# Patient Record
Sex: FEMALE | Race: WHITE | Employment: FULL TIME | URBAN - METROPOLITAN AREA
[De-identification: names, ages, dates, MRNs, and addresses within clinical notes are randomized per-mention and may not be internally consistent; named-entity substitution may affect disease eponyms.]

---

## 2017-01-27 ENCOUNTER — TELEPHONE (OUTPATIENT)
Dept: FAMILY MEDICINE CLINIC | Age: 47
End: 2017-01-27

## 2017-01-27 VITALS — SYSTOLIC BLOOD PRESSURE: 156 MMHG | DIASTOLIC BLOOD PRESSURE: 90 MMHG

## 2017-02-07 ENCOUNTER — OFFICE VISIT (OUTPATIENT)
Dept: FAMILY MEDICINE CLINIC | Age: 47
End: 2017-02-07

## 2017-02-07 VITALS
HEART RATE: 74 BPM | WEIGHT: 256.25 LBS | SYSTOLIC BLOOD PRESSURE: 150 MMHG | HEIGHT: 66 IN | TEMPERATURE: 96.7 F | DIASTOLIC BLOOD PRESSURE: 100 MMHG | BODY MASS INDEX: 41.18 KG/M2 | RESPIRATION RATE: 12 BRPM | OXYGEN SATURATION: 99 %

## 2017-02-07 DIAGNOSIS — F41.9 ANXIETY: ICD-10-CM

## 2017-02-07 DIAGNOSIS — I15.0 RENOVASCULAR HYPERTENSION: Primary | ICD-10-CM

## 2017-02-07 PROCEDURE — 99213 OFFICE O/P EST LOW 20 MIN: CPT | Performed by: FAMILY MEDICINE

## 2017-02-07 ASSESSMENT — ENCOUNTER SYMPTOMS
EYES NEGATIVE: 1
NAUSEA: 0
CONSTIPATION: 0
COUGH: 0
ABDOMINAL PAIN: 0
SHORTNESS OF BREATH: 0
DIARRHEA: 0
VOMITING: 0

## 2017-02-17 ENCOUNTER — NURSE ONLY (OUTPATIENT)
Dept: FAMILY MEDICINE CLINIC | Age: 47
End: 2017-02-17

## 2017-02-17 DIAGNOSIS — Z01.30 BLOOD PRESSURE CHECK: Primary | ICD-10-CM

## 2017-02-21 VITALS — DIASTOLIC BLOOD PRESSURE: 80 MMHG | SYSTOLIC BLOOD PRESSURE: 130 MMHG

## 2017-03-03 RX ORDER — CHLORTHALIDONE 25 MG/1
25 TABLET ORAL DAILY
Qty: 30 TABLET | Refills: 3 | Status: SHIPPED | OUTPATIENT
Start: 2017-03-03 | End: 2017-03-14 | Stop reason: SDUPTHER

## 2017-03-14 ENCOUNTER — OFFICE VISIT (OUTPATIENT)
Dept: FAMILY MEDICINE CLINIC | Age: 47
End: 2017-03-14

## 2017-03-14 VITALS
OXYGEN SATURATION: 97 % | SYSTOLIC BLOOD PRESSURE: 130 MMHG | BODY MASS INDEX: 40.73 KG/M2 | HEART RATE: 77 BPM | WEIGHT: 253.44 LBS | TEMPERATURE: 97.8 F | HEIGHT: 66 IN | RESPIRATION RATE: 12 BRPM | DIASTOLIC BLOOD PRESSURE: 82 MMHG

## 2017-03-14 DIAGNOSIS — I15.0 RENOVASCULAR HYPERTENSION: Primary | ICD-10-CM

## 2017-03-14 PROCEDURE — 99213 OFFICE O/P EST LOW 20 MIN: CPT | Performed by: FAMILY MEDICINE

## 2017-03-14 RX ORDER — NEBIVOLOL 10 MG/1
20 TABLET ORAL DAILY
Qty: 270 TABLET | Refills: 1
Start: 2017-03-14 | End: 2017-10-24 | Stop reason: SDUPTHER

## 2017-03-14 RX ORDER — MELOXICAM 15 MG/1
TABLET ORAL
COMMUNITY
Start: 2016-12-13 | End: 2017-08-31 | Stop reason: ALTCHOICE

## 2017-03-14 RX ORDER — CHLORTHALIDONE 25 MG/1
25 TABLET ORAL DAILY
Qty: 90 TABLET | Refills: 1 | Status: SHIPPED | OUTPATIENT
Start: 2017-03-14 | End: 2017-03-25 | Stop reason: DRUGHIGH

## 2017-03-25 ENCOUNTER — OFFICE VISIT (OUTPATIENT)
Dept: FAMILY MEDICINE CLINIC | Age: 47
End: 2017-03-25

## 2017-03-25 VITALS
DIASTOLIC BLOOD PRESSURE: 80 MMHG | BODY MASS INDEX: 41.59 KG/M2 | WEIGHT: 258.8 LBS | TEMPERATURE: 98.3 F | SYSTOLIC BLOOD PRESSURE: 130 MMHG | RESPIRATION RATE: 18 BRPM | HEART RATE: 80 BPM | HEIGHT: 66 IN

## 2017-03-25 DIAGNOSIS — I15.0 RENOVASCULAR HYPERTENSION: ICD-10-CM

## 2017-03-25 DIAGNOSIS — M10.271 ACUTE DRUG-INDUCED GOUT INVOLVING TOE OF RIGHT FOOT: Primary | ICD-10-CM

## 2017-03-25 PROCEDURE — 96372 THER/PROPH/DIAG INJ SC/IM: CPT | Performed by: FAMILY MEDICINE

## 2017-03-25 PROCEDURE — 99213 OFFICE O/P EST LOW 20 MIN: CPT | Performed by: FAMILY MEDICINE

## 2017-03-25 RX ORDER — CHLORTHALIDONE 25 MG/1
12.5 TABLET ORAL DAILY
Qty: 90 TABLET | Refills: 1 | Status: SHIPPED
Start: 2017-03-25 | End: 2018-04-18 | Stop reason: SDUPTHER

## 2017-03-25 RX ORDER — KETOROLAC TROMETHAMINE 30 MG/ML
60 INJECTION, SOLUTION INTRAMUSCULAR; INTRAVENOUS ONCE
Status: COMPLETED | OUTPATIENT
Start: 2017-03-25 | End: 2017-03-25

## 2017-03-25 RX ORDER — TRIAMCINOLONE ACETONIDE 40 MG/ML
80 INJECTION, SUSPENSION INTRA-ARTICULAR; INTRAMUSCULAR ONCE
Status: COMPLETED | OUTPATIENT
Start: 2017-03-25 | End: 2017-03-25

## 2017-03-25 RX ORDER — PREDNISONE 10 MG/1
TABLET ORAL
Qty: 60 TABLET | Refills: 0 | Status: SHIPPED | OUTPATIENT
Start: 2017-03-25 | End: 2017-04-05 | Stop reason: ALTCHOICE

## 2017-03-25 RX ADMIN — TRIAMCINOLONE ACETONIDE 80 MG: 40 INJECTION, SUSPENSION INTRA-ARTICULAR; INTRAMUSCULAR at 12:39

## 2017-03-25 RX ADMIN — KETOROLAC TROMETHAMINE 60 MG: 30 INJECTION, SOLUTION INTRAMUSCULAR; INTRAVENOUS at 12:38

## 2017-04-03 ENCOUNTER — NURSE ONLY (OUTPATIENT)
Dept: FAMILY MEDICINE CLINIC | Age: 47
End: 2017-04-03

## 2017-04-03 VITALS — SYSTOLIC BLOOD PRESSURE: 148 MMHG | DIASTOLIC BLOOD PRESSURE: 90 MMHG

## 2017-04-03 DIAGNOSIS — I15.9 SECONDARY HYPERTENSION: Primary | ICD-10-CM

## 2017-04-05 ENCOUNTER — OFFICE VISIT (OUTPATIENT)
Dept: FAMILY MEDICINE CLINIC | Age: 47
End: 2017-04-05

## 2017-04-05 VITALS
TEMPERATURE: 97.6 F | BODY MASS INDEX: 41.2 KG/M2 | WEIGHT: 256.38 LBS | HEIGHT: 66 IN | DIASTOLIC BLOOD PRESSURE: 84 MMHG | RESPIRATION RATE: 12 BRPM | SYSTOLIC BLOOD PRESSURE: 126 MMHG | HEART RATE: 80 BPM

## 2017-04-05 DIAGNOSIS — J01.00 ACUTE NON-RECURRENT MAXILLARY SINUSITIS: Primary | ICD-10-CM

## 2017-04-05 DIAGNOSIS — I15.0 RENOVASCULAR HYPERTENSION: ICD-10-CM

## 2017-04-05 PROCEDURE — 99213 OFFICE O/P EST LOW 20 MIN: CPT | Performed by: FAMILY MEDICINE

## 2017-04-05 RX ORDER — AZITHROMYCIN 250 MG/1
TABLET, FILM COATED ORAL
Qty: 1 PACKET | Refills: 1 | Status: SHIPPED | OUTPATIENT
Start: 2017-04-05 | End: 2017-04-15

## 2017-04-05 ASSESSMENT — ENCOUNTER SYMPTOMS
SINUS PAIN: 1
COUGH: 1

## 2017-05-03 ENCOUNTER — OFFICE VISIT (OUTPATIENT)
Dept: FAMILY MEDICINE CLINIC | Age: 47
End: 2017-05-03

## 2017-05-03 VITALS
BODY MASS INDEX: 41.82 KG/M2 | SYSTOLIC BLOOD PRESSURE: 130 MMHG | HEART RATE: 69 BPM | OXYGEN SATURATION: 96 % | WEIGHT: 260.19 LBS | TEMPERATURE: 96.9 F | DIASTOLIC BLOOD PRESSURE: 84 MMHG | HEIGHT: 66 IN

## 2017-05-03 DIAGNOSIS — M94.0 ACUTE COSTOCHONDRITIS: ICD-10-CM

## 2017-05-03 DIAGNOSIS — J30.2 SEASONAL ALLERGIC RHINITIS, UNSPECIFIED ALLERGIC RHINITIS TRIGGER: Primary | ICD-10-CM

## 2017-05-03 PROCEDURE — 99213 OFFICE O/P EST LOW 20 MIN: CPT | Performed by: FAMILY MEDICINE

## 2017-05-03 RX ORDER — FLUTICASONE PROPIONATE 50 MCG
2 SPRAY, SUSPENSION (ML) NASAL DAILY
Qty: 1 BOTTLE | Refills: 3 | Status: SHIPPED | OUTPATIENT
Start: 2017-05-03 | End: 2017-08-31 | Stop reason: ALTCHOICE

## 2017-05-03 RX ORDER — CETIRIZINE HYDROCHLORIDE 10 MG/1
10 TABLET ORAL DAILY
Qty: 90 TABLET | Refills: 1 | Status: SHIPPED | OUTPATIENT
Start: 2017-05-03 | End: 2017-10-24 | Stop reason: ALTCHOICE

## 2017-05-03 ASSESSMENT — ENCOUNTER SYMPTOMS
SINUS PAIN: 1
COUGH: 1
ABDOMINAL PAIN: 1

## 2017-06-01 DIAGNOSIS — I15.0 RENOVASCULAR HYPERTENSION: ICD-10-CM

## 2017-06-02 RX ORDER — NEBIVOLOL HYDROCHLORIDE 10 MG/1
TABLET ORAL
Qty: 270 TABLET | Refills: 1 | Status: SHIPPED | OUTPATIENT
Start: 2017-06-02 | End: 2018-01-26 | Stop reason: SDUPTHER

## 2017-06-07 ENCOUNTER — OFFICE VISIT (OUTPATIENT)
Dept: FAMILY MEDICINE CLINIC | Age: 47
End: 2017-06-07

## 2017-06-07 VITALS
HEIGHT: 66 IN | DIASTOLIC BLOOD PRESSURE: 82 MMHG | OXYGEN SATURATION: 98 % | SYSTOLIC BLOOD PRESSURE: 138 MMHG | HEART RATE: 78 BPM | BODY MASS INDEX: 41.95 KG/M2 | WEIGHT: 261 LBS | TEMPERATURE: 97.3 F

## 2017-06-07 DIAGNOSIS — I15.0 RENOVASCULAR HYPERTENSION: ICD-10-CM

## 2017-06-07 DIAGNOSIS — J01.00 ACUTE NON-RECURRENT MAXILLARY SINUSITIS: Primary | ICD-10-CM

## 2017-06-07 PROCEDURE — 99213 OFFICE O/P EST LOW 20 MIN: CPT | Performed by: FAMILY MEDICINE

## 2017-06-07 RX ORDER — AZITHROMYCIN 250 MG/1
TABLET, FILM COATED ORAL
Qty: 1 PACKET | Refills: 1 | Status: SHIPPED | OUTPATIENT
Start: 2017-06-07 | End: 2017-06-17 | Stop reason: ALTCHOICE

## 2017-06-07 ASSESSMENT — ENCOUNTER SYMPTOMS
SINUS PAIN: 1
COUGH: 1
SORE THROAT: 1
RHINORRHEA: 1

## 2017-06-09 ENCOUNTER — TELEPHONE (OUTPATIENT)
Dept: FAMILY MEDICINE CLINIC | Age: 47
End: 2017-06-09

## 2017-06-09 RX ORDER — CEFDINIR 300 MG/1
300 CAPSULE ORAL 2 TIMES DAILY
Qty: 20 CAPSULE | Refills: 0 | Status: CANCELLED | OUTPATIENT
Start: 2017-06-09 | End: 2017-06-19

## 2017-06-09 RX ORDER — CEFUROXIME AXETIL 250 MG/1
250 TABLET ORAL 2 TIMES DAILY
Qty: 20 TABLET | Refills: 0 | Status: SHIPPED | OUTPATIENT
Start: 2017-06-09 | End: 2017-06-19

## 2017-06-14 ENCOUNTER — TELEPHONE (OUTPATIENT)
Dept: FAMILY MEDICINE CLINIC | Age: 47
End: 2017-06-14

## 2017-06-14 RX ORDER — METHYLPREDNISOLONE 4 MG/1
TABLET ORAL
Qty: 1 KIT | Refills: 0 | Status: SHIPPED | OUTPATIENT
Start: 2017-06-14 | End: 2017-06-20

## 2017-06-17 ENCOUNTER — OFFICE VISIT (OUTPATIENT)
Dept: FAMILY MEDICINE CLINIC | Age: 47
End: 2017-06-17

## 2017-06-17 VITALS
SYSTOLIC BLOOD PRESSURE: 138 MMHG | HEART RATE: 60 BPM | WEIGHT: 257.2 LBS | DIASTOLIC BLOOD PRESSURE: 82 MMHG | TEMPERATURE: 97.7 F | HEIGHT: 66 IN | BODY MASS INDEX: 41.33 KG/M2 | RESPIRATION RATE: 16 BRPM

## 2017-06-17 DIAGNOSIS — H93.8X3 EAR FULLNESS, BILATERAL: ICD-10-CM

## 2017-06-17 DIAGNOSIS — H69.83 EUSTACHIAN TUBE DYSFUNCTION, BILATERAL: Primary | ICD-10-CM

## 2017-06-17 PROCEDURE — 92567 TYMPANOMETRY: CPT | Performed by: FAMILY MEDICINE

## 2017-06-17 PROCEDURE — 99213 OFFICE O/P EST LOW 20 MIN: CPT | Performed by: FAMILY MEDICINE

## 2017-06-17 RX ORDER — PREDNISONE 10 MG/1
TABLET ORAL
Qty: 51 TABLET | Refills: 0 | Status: SHIPPED | OUTPATIENT
Start: 2017-06-17 | End: 2017-06-27

## 2017-06-19 ENCOUNTER — TELEPHONE (OUTPATIENT)
Dept: FAMILY MEDICINE CLINIC | Age: 47
End: 2017-06-19

## 2017-06-23 ENCOUNTER — TELEPHONE (OUTPATIENT)
Dept: OBGYN | Age: 47
End: 2017-06-23

## 2017-06-28 ENCOUNTER — OFFICE VISIT (OUTPATIENT)
Dept: FAMILY MEDICINE CLINIC | Age: 47
End: 2017-06-28

## 2017-06-28 VITALS
HEART RATE: 74 BPM | TEMPERATURE: 97.7 F | WEIGHT: 257 LBS | DIASTOLIC BLOOD PRESSURE: 72 MMHG | BODY MASS INDEX: 41.3 KG/M2 | SYSTOLIC BLOOD PRESSURE: 118 MMHG | HEIGHT: 66 IN

## 2017-06-28 DIAGNOSIS — J30.2 SEASONAL ALLERGIC RHINITIS, UNSPECIFIED ALLERGIC RHINITIS TRIGGER: ICD-10-CM

## 2017-06-28 DIAGNOSIS — J01.01 ACUTE RECURRENT MAXILLARY SINUSITIS: Primary | ICD-10-CM

## 2017-06-28 PROCEDURE — 96372 THER/PROPH/DIAG INJ SC/IM: CPT | Performed by: FAMILY MEDICINE

## 2017-06-28 PROCEDURE — 99213 OFFICE O/P EST LOW 20 MIN: CPT | Performed by: FAMILY MEDICINE

## 2017-06-28 RX ORDER — TRIAMCINOLONE ACETONIDE 40 MG/ML
80 INJECTION, SUSPENSION INTRA-ARTICULAR; INTRAMUSCULAR ONCE
Status: COMPLETED | OUTPATIENT
Start: 2017-06-28 | End: 2017-06-28

## 2017-06-28 RX ADMIN — TRIAMCINOLONE ACETONIDE 80 MG: 40 INJECTION, SUSPENSION INTRA-ARTICULAR; INTRAMUSCULAR at 12:08

## 2017-06-28 ASSESSMENT — ENCOUNTER SYMPTOMS
CONSTIPATION: 0
EYE DISCHARGE: 0
SINUS PRESSURE: 0
SORE THROAT: 1
ABDOMINAL PAIN: 0
RHINORRHEA: 0
COUGH: 1
DIARRHEA: 0
EYE ITCHING: 0

## 2017-08-24 ENCOUNTER — NURSE ONLY (OUTPATIENT)
Dept: FAMILY MEDICINE CLINIC | Age: 47
End: 2017-08-24

## 2017-08-24 VITALS — DIASTOLIC BLOOD PRESSURE: 80 MMHG | SYSTOLIC BLOOD PRESSURE: 138 MMHG

## 2017-08-25 ENCOUNTER — TELEPHONE (OUTPATIENT)
Dept: FAMILY MEDICINE CLINIC | Age: 47
End: 2017-08-25

## 2017-08-26 ENCOUNTER — OFFICE VISIT (OUTPATIENT)
Dept: FAMILY MEDICINE CLINIC | Age: 47
End: 2017-08-26

## 2017-08-26 VITALS
HEART RATE: 82 BPM | RESPIRATION RATE: 18 BRPM | BODY MASS INDEX: 40.5 KG/M2 | TEMPERATURE: 97.3 F | SYSTOLIC BLOOD PRESSURE: 136 MMHG | WEIGHT: 252 LBS | HEIGHT: 66 IN | DIASTOLIC BLOOD PRESSURE: 82 MMHG

## 2017-08-26 DIAGNOSIS — R23.8 SKIN SENSITIVITY: Primary | ICD-10-CM

## 2017-08-26 DIAGNOSIS — R20.0 NUMBNESS: ICD-10-CM

## 2017-08-26 PROCEDURE — 99213 OFFICE O/P EST LOW 20 MIN: CPT | Performed by: FAMILY MEDICINE

## 2017-08-26 RX ORDER — METHYLPREDNISOLONE 4 MG/1
TABLET ORAL
Qty: 1 KIT | Refills: 0 | Status: SHIPPED | OUTPATIENT
Start: 2017-08-26 | End: 2017-08-31 | Stop reason: ALTCHOICE

## 2017-08-26 ASSESSMENT — ENCOUNTER SYMPTOMS
WHEEZING: 0
SHORTNESS OF BREATH: 0
ABDOMINAL PAIN: 1
NAUSEA: 0
DIARRHEA: 0
BACK PAIN: 1
SORE THROAT: 0
SINUS PRESSURE: 0
RHINORRHEA: 0
CHEST TIGHTNESS: 0
VOMITING: 0
COUGH: 0

## 2017-08-26 ASSESSMENT — PATIENT HEALTH QUESTIONNAIRE - PHQ9
SUM OF ALL RESPONSES TO PHQ QUESTIONS 1-9: 0
SUM OF ALL RESPONSES TO PHQ9 QUESTIONS 1 & 2: 0
1. LITTLE INTEREST OR PLEASURE IN DOING THINGS: 0
2. FEELING DOWN, DEPRESSED OR HOPELESS: 0

## 2017-08-31 ENCOUNTER — OFFICE VISIT (OUTPATIENT)
Dept: FAMILY MEDICINE CLINIC | Age: 47
End: 2017-08-31

## 2017-08-31 VITALS
DIASTOLIC BLOOD PRESSURE: 84 MMHG | HEART RATE: 78 BPM | SYSTOLIC BLOOD PRESSURE: 112 MMHG | RESPIRATION RATE: 12 BRPM | WEIGHT: 249 LBS | TEMPERATURE: 98.4 F | HEIGHT: 66 IN | BODY MASS INDEX: 40.02 KG/M2

## 2017-08-31 DIAGNOSIS — J06.9 ACUTE UPPER RESPIRATORY INFECTION: Primary | ICD-10-CM

## 2017-08-31 DIAGNOSIS — I15.0 RENOVASCULAR HYPERTENSION: ICD-10-CM

## 2017-08-31 PROCEDURE — 99213 OFFICE O/P EST LOW 20 MIN: CPT | Performed by: FAMILY MEDICINE

## 2017-08-31 RX ORDER — METHYLPREDNISOLONE 4 MG/1
TABLET ORAL
Qty: 1 KIT | Refills: 0 | Status: SHIPPED | OUTPATIENT
Start: 2017-08-31 | End: 2017-09-06

## 2017-08-31 RX ORDER — AZITHROMYCIN 250 MG/1
TABLET, FILM COATED ORAL
Qty: 1 PACKET | Refills: 0 | Status: SHIPPED | OUTPATIENT
Start: 2017-08-31 | End: 2017-09-10

## 2017-10-24 ENCOUNTER — OFFICE VISIT (OUTPATIENT)
Dept: FAMILY MEDICINE CLINIC | Age: 47
End: 2017-10-24

## 2017-10-24 VITALS
SYSTOLIC BLOOD PRESSURE: 138 MMHG | HEIGHT: 66 IN | WEIGHT: 257.13 LBS | HEART RATE: 77 BPM | TEMPERATURE: 95.8 F | RESPIRATION RATE: 12 BRPM | BODY MASS INDEX: 41.32 KG/M2 | DIASTOLIC BLOOD PRESSURE: 88 MMHG | OXYGEN SATURATION: 97 %

## 2017-10-24 DIAGNOSIS — H65.06 RECURRENT ACUTE SEROUS OTITIS MEDIA OF BOTH EARS: Primary | ICD-10-CM

## 2017-10-24 PROCEDURE — 99213 OFFICE O/P EST LOW 20 MIN: CPT | Performed by: FAMILY MEDICINE

## 2017-10-24 RX ORDER — METHYLPREDNISOLONE 4 MG/1
TABLET ORAL
Qty: 1 KIT | Refills: 0 | Status: SHIPPED | OUTPATIENT
Start: 2017-10-24 | End: 2017-10-30

## 2017-10-24 NOTE — PROGRESS NOTES
Subjective:      Patient ID: Portillo Miranda is a 56 y.o. female. Chief Complaint   Patient presents with    Otalgia     Bilateral x 2 weeks. Gradually worsening since onset. No other sympotms. Has tried Sudafed with no relief. HPI     Positive pain behind both ears seem to gradually worsening last couple days has tried some Sudafed relief no fevers no chills been eating drinking pretty well otherwise      States she did get off cruise and a plain sure but feels more pressure      Allergies   Allergen Reactions    Codeine      Other reaction(s): SEVERE STOMACH PAIN    Morphine And Related      Other reaction(s): PATIENT DOESN'T RECALL REACTION.  Adhesive Tape Rash    Avelox [Moxifloxacin Hcl In Nacl] Rash    Penicillins Rash     Outpatient Encounter Prescriptions as of 10/24/2017   Medication Sig Dispense Refill    methylPREDNISolone (MEDROL DOSEPACK) 4 MG tablet Take by mouth. 1 kit 0    BYSTOLIC 10 MG tablet TAKE 3 TABLETS BY MOUTH DAILY 270 tablet 1    chlorthalidone (HYGROTON) 25 MG tablet Take 0.5 tablets by mouth daily 90 tablet 1    Azilsartan Medoxomil 80 MG TABS 1 per day 30 tablet 3    [DISCONTINUED] cetirizine (ZYRTEC ALLERGY) 10 MG tablet Take 1 tablet by mouth daily 90 tablet 1    [DISCONTINUED] nebivolol (BYSTOLIC) 10 MG tablet Take 2 tablets by mouth daily 270 tablet 1     Facility-Administered Encounter Medications as of 10/24/2017   Medication Dose Route Frequency Provider Last Rate Last Dose    triamcinolone acetonide (KENALOG-40) injection 80 mg  80 mg Intramuscular Once Kei Marie, DO         Social History     Social History    Marital status:      Spouse name: N/A    Number of children: N/A    Years of education: N/A     Occupational History    Not on file.      Social History Main Topics    Smoking status: Never Smoker    Smokeless tobacco: Never Used    Alcohol use Yes      Comment: ocassionally    Drug use: No    Sexual activity: Not on file Other Topics Concern    Not on file     Social History Narrative    No narrative on file     Family History   Problem Relation Age of Onset    High Blood Pressure Mother     High Blood Pressure Father      Past Medical History:   Diagnosis Date    Anxiety     CAD (coronary artery disease)     Hypertension     Somatic dysfunction of lumbar region      Past Surgical History:   Procedure Laterality Date    CARDIAC CATHETERIZATION      CARPAL TUNNEL RELEASE  01/03/14     DR FORD    HERNIA REPAIR           REVIEW OF SYSTEMS:   REVIEW OF SYSTEMS:   Patient seen today for exam.  Denies any problems with hearing, headaches or vision. Denies any shortness of breath, chest pain, nausea or vomiting. No black stool, no blood in the stool. No heartburn. Denies any problems with constipation or diarrhea either. No dysuria type symptoms. Objective:     /88 (Site: Left Arm, Position: Sitting, Cuff Size: Large Adult)   Pulse 77   Temp 95.8 °F (35.4 °C) (Temporal)   Resp 12   Ht 5' 6\" (1.676 m)   Wt 257 lb 2 oz (116.6 kg)   SpO2 97%   BMI 41.50 kg/m²     Physical Exam      O:  Alert and active female in no acute distress  HEENT:  TMs positive ear tube noted in the left patent the right also noted with some cerumen Pharynx neg. Nares clear, no drainage noted  Neck supple/ no adenopathy   HEART:  RRR without murmur/ no carotid bruits  LUNGS:  Clear to auscultation bilaterally, no wheeze or rhonchi noted  THYROID: neg masses or nodularity  ABDOMEN:  Soft x4. Bowel sounds positive. No masses or organomegaly,  Negative tenderness, guarding or rebound. EXTR:  Without edema./ good pulses bilat    Neurologic exam unremarkable. DTRs in upper and lower extremities within normal limits. Full strength noted    Skin- no lesions noted       Assessment:      1.  Recurrent acute serous otitis media of both ears               Plan:        Orders Placed This Encounter   Medications    methylPREDNISolone (MEDROL DOSEPACK) 4 MG tablet     Sig: Take by mouth. Dispense:  1 kit     Refill:  0     No orders of the defined types were placed in this encounter.           Health Maintenance Due   Topic Date Due    Flu vaccine (1) 09/01/2017        will hold off on antibiotics as I think this most likely viral her ear tube on the right is half clogged and will see ENT if it worsens      Controlled Substances Monitoring:              If anything worsens or changes please call us at once , follow-up in the office as planned,

## 2017-12-06 ENCOUNTER — OFFICE VISIT (OUTPATIENT)
Dept: FAMILY MEDICINE CLINIC | Age: 47
End: 2017-12-06

## 2017-12-06 DIAGNOSIS — I15.0 HYPERTENSION, RENOVASCULAR: Primary | ICD-10-CM

## 2017-12-06 DIAGNOSIS — Z12.31 VISIT FOR SCREENING MAMMOGRAM: ICD-10-CM

## 2017-12-06 DIAGNOSIS — E78.49 OTHER HYPERLIPIDEMIA: ICD-10-CM

## 2017-12-06 DIAGNOSIS — I15.0 RENOVASCULAR HYPERTENSION: Primary | ICD-10-CM

## 2017-12-06 DIAGNOSIS — R22.31 AXILLARY LUMP, RIGHT: ICD-10-CM

## 2017-12-06 DIAGNOSIS — Z00.00 ANNUAL PHYSICAL EXAM: ICD-10-CM

## 2017-12-06 PROCEDURE — 99213 OFFICE O/P EST LOW 20 MIN: CPT | Performed by: FAMILY MEDICINE

## 2017-12-06 RX ORDER — FAMOTIDINE 20 MG/1
20 TABLET, FILM COATED ORAL 2 TIMES DAILY
Qty: 60 TABLET | Refills: 3 | Status: SHIPPED | OUTPATIENT
Start: 2017-12-06 | End: 2018-02-07 | Stop reason: SDUPTHER

## 2017-12-06 NOTE — PROGRESS NOTES
History Narrative    No narrative on file     Family History   Problem Relation Age of Onset    High Blood Pressure Mother     High Blood Pressure Father      Past Medical History:   Diagnosis Date    Anxiety     CAD (coronary artery disease)     Hypertension     Somatic dysfunction of lumbar region      Past Surgical History:   Procedure Laterality Date    CARDIAC CATHETERIZATION      CARPAL TUNNEL RELEASE  01/03/14     DR FORD    HERNIA REPAIR           REVIEW OF SYSTEMS:   REVIEW OF SYSTEMS:   Patient seen today for exam.  Denies any problems with hearing, headaches or vision. Denies any shortness of breath, chest pain, nausea or vomiting. No black stool, no blood in the stool. No heartburn. Denies any problems with constipation or diarrhea either. No dysuria type symptoms. Objective:     /88 (Site: Right Arm, Position: Sitting, Cuff Size: Large Adult)   Pulse 71   Temp 96.4 °F (35.8 °C) (Temporal)   Resp 12   Ht 5' 6\" (1.676 m)   Wt 254 lb 2 oz (115.3 kg)   SpO2 97%   BMI 41.02 kg/m²     Physical Exam      O:  Alert and active female in no acute distress  HEENT:  TMs clear. Pharynx neg. Nares clear, no drainage noted  Neck supple/ no adenopathy   HEART:  RRR without murmur/ no carotid bruits  LUNGS:  Clear to auscultation bilaterally, no wheeze or rhonchi noted  THYROID: neg masses or nodularity  ABDOMEN:  Soft x4. Bowel sounds positive. No masses or organomegaly,  Negative tenderness, guarding or rebound. EXTR:  Without edema./ good pulses bilat    Neurologic exam unremarkable. DTRs in upper and lower extremities within normal limits. Full strength noted    Skin- no lesions noted       Assessment:      1. Renovascular hypertension     2. Other hyperlipidemia     3. Axillary lump, right     4. Visit for screening mammogram  BELLA DIGITAL SCREEN W OR WO CAD BILATERAL   5.  Annual physical exam  CBC Auto Differential    Comprehensive Metabolic Panel    Lipid Panel

## 2017-12-07 DIAGNOSIS — I15.0 HYPERTENSION, RENOVASCULAR: Primary | ICD-10-CM

## 2017-12-07 DIAGNOSIS — R22.31 AXILLARY LUMP, RIGHT: ICD-10-CM

## 2017-12-07 DIAGNOSIS — Z00.00 ANNUAL PHYSICAL EXAM: ICD-10-CM

## 2017-12-07 LAB
ALBUMIN SERPL-MCNC: 4.1 G/DL (ref 3.9–4.9)
ALP BLD-CCNC: 90 U/L (ref 40–130)
ALT SERPL-CCNC: 18 U/L (ref 0–33)
ANION GAP SERPL CALCULATED.3IONS-SCNC: 16 MEQ/L (ref 7–13)
AST SERPL-CCNC: 21 U/L (ref 0–35)
BASOPHILS ABSOLUTE: 0 K/UL (ref 0–0.2)
BASOPHILS RELATIVE PERCENT: 0.3 %
BILIRUB SERPL-MCNC: 0.3 MG/DL (ref 0–1.2)
BUN BLDV-MCNC: 17 MG/DL (ref 6–20)
CALCIUM SERPL-MCNC: 9.6 MG/DL (ref 8.6–10.2)
CHLORIDE BLD-SCNC: 102 MEQ/L (ref 98–107)
CHOLESTEROL, TOTAL: 205 MG/DL (ref 0–199)
CO2: 26 MEQ/L (ref 22–29)
CREAT SERPL-MCNC: 0.78 MG/DL (ref 0.5–0.9)
EOSINOPHILS ABSOLUTE: 0.1 K/UL (ref 0–0.7)
EOSINOPHILS RELATIVE PERCENT: 1.3 %
GFR AFRICAN AMERICAN: >60
GFR NON-AFRICAN AMERICAN: >60
GLOBULIN: 3.4 G/DL (ref 2.3–3.5)
GLUCOSE BLD-MCNC: 79 MG/DL (ref 74–109)
HCT VFR BLD CALC: 38.3 % (ref 37–47)
HDLC SERPL-MCNC: 45 MG/DL (ref 40–59)
HEMOGLOBIN: 12.7 G/DL (ref 12–16)
LDL CHOLESTEROL CALCULATED: 123 MG/DL (ref 0–129)
LYMPHOCYTES ABSOLUTE: 2.5 K/UL (ref 1–4.8)
LYMPHOCYTES RELATIVE PERCENT: 37.2 %
MCH RBC QN AUTO: 29.4 PG (ref 27–31.3)
MCHC RBC AUTO-ENTMCNC: 33.2 % (ref 33–37)
MCV RBC AUTO: 88.5 FL (ref 82–100)
MONOCYTES ABSOLUTE: 0.4 K/UL (ref 0.2–0.8)
MONOCYTES RELATIVE PERCENT: 6.1 %
NEUTROPHILS ABSOLUTE: 3.7 K/UL (ref 1.4–6.5)
NEUTROPHILS RELATIVE PERCENT: 55.1 %
PDW BLD-RTO: 15.1 % (ref 11.5–14.5)
PLATELET # BLD: 215 K/UL (ref 130–400)
POTASSIUM SERPL-SCNC: 5.1 MEQ/L (ref 3.5–5.1)
RBC # BLD: 4.33 M/UL (ref 4.2–5.4)
SODIUM BLD-SCNC: 144 MEQ/L (ref 132–144)
TOTAL PROTEIN: 7.5 G/DL (ref 6.4–8.1)
TRIGL SERPL-MCNC: 184 MG/DL (ref 0–200)
WBC # BLD: 6.7 K/UL (ref 4.8–10.8)

## 2017-12-10 VITALS
SYSTOLIC BLOOD PRESSURE: 128 MMHG | HEIGHT: 66 IN | RESPIRATION RATE: 12 BRPM | OXYGEN SATURATION: 97 % | BODY MASS INDEX: 40.84 KG/M2 | DIASTOLIC BLOOD PRESSURE: 88 MMHG | HEART RATE: 71 BPM | WEIGHT: 254.13 LBS | TEMPERATURE: 96.4 F

## 2017-12-21 ENCOUNTER — HOSPITAL ENCOUNTER (OUTPATIENT)
Dept: WOMENS IMAGING | Age: 47
Discharge: HOME | End: 2017-12-21
Payer: COMMERCIAL

## 2017-12-21 ENCOUNTER — HOSPITAL ENCOUNTER (OUTPATIENT)
Dept: ULTRASOUND IMAGING | Age: 47
Discharge: HOME | End: 2017-12-21
Payer: COMMERCIAL

## 2017-12-21 DIAGNOSIS — R22.31 AXILLARY LUMP, RIGHT: ICD-10-CM

## 2017-12-21 DIAGNOSIS — I15.0 HYPERTENSION, RENOVASCULAR: ICD-10-CM

## 2017-12-21 PROCEDURE — 76642 ULTRASOUND BREAST LIMITED: CPT

## 2017-12-21 PROCEDURE — G0204 DX MAMMO INCL CAD BI: HCPCS

## 2018-01-26 DIAGNOSIS — I15.0 RENOVASCULAR HYPERTENSION: ICD-10-CM

## 2018-01-26 RX ORDER — NEBIVOLOL HYDROCHLORIDE 10 MG/1
TABLET ORAL
Qty: 270 TABLET | Refills: 1 | Status: SHIPPED | OUTPATIENT
Start: 2018-01-26 | End: 2018-04-18 | Stop reason: SDUPTHER

## 2018-02-02 RX ORDER — CHLORTHALIDONE 25 MG/1
25 TABLET ORAL DAILY
Qty: 90 TABLET | Refills: 1 | Status: SHIPPED | OUTPATIENT
Start: 2018-02-02 | End: 2018-07-30

## 2018-02-07 RX ORDER — FAMOTIDINE 20 MG/1
20 TABLET, FILM COATED ORAL 2 TIMES DAILY
Qty: 60 TABLET | Refills: 3 | Status: SHIPPED | OUTPATIENT
Start: 2018-02-07 | End: 2018-07-25 | Stop reason: SDUPTHER

## 2018-02-18 DIAGNOSIS — I15.0 RENOVASCULAR HYPERTENSION: ICD-10-CM

## 2018-02-19 RX ORDER — NEBIVOLOL HYDROCHLORIDE 10 MG/1
TABLET ORAL
Qty: 270 TABLET | Refills: 1 | Status: SHIPPED | OUTPATIENT
Start: 2018-02-19 | End: 2018-12-10 | Stop reason: SDUPTHER

## 2018-04-18 ENCOUNTER — OFFICE VISIT (OUTPATIENT)
Dept: FAMILY MEDICINE CLINIC | Age: 48
End: 2018-04-18
Payer: COMMERCIAL

## 2018-04-18 VITALS
DIASTOLIC BLOOD PRESSURE: 84 MMHG | RESPIRATION RATE: 12 BRPM | WEIGHT: 265 LBS | BODY MASS INDEX: 42.59 KG/M2 | OXYGEN SATURATION: 96 % | TEMPERATURE: 96.1 F | HEIGHT: 66 IN | SYSTOLIC BLOOD PRESSURE: 130 MMHG | HEART RATE: 87 BPM

## 2018-04-18 DIAGNOSIS — M25.561 CHRONIC PAIN OF RIGHT KNEE: ICD-10-CM

## 2018-04-18 DIAGNOSIS — I15.0 RENOVASCULAR HYPERTENSION: ICD-10-CM

## 2018-04-18 DIAGNOSIS — G89.29 CHRONIC PAIN OF RIGHT KNEE: ICD-10-CM

## 2018-04-18 DIAGNOSIS — J32.0 CHRONIC MAXILLARY SINUSITIS: Primary | ICD-10-CM

## 2018-04-18 PROCEDURE — 99213 OFFICE O/P EST LOW 20 MIN: CPT | Performed by: FAMILY MEDICINE

## 2018-04-18 RX ORDER — METHYLPREDNISOLONE 4 MG/1
TABLET ORAL
Qty: 1 KIT | Refills: 0 | Status: SHIPPED | OUTPATIENT
Start: 2018-04-18 | End: 2018-04-24

## 2018-05-14 ENCOUNTER — OFFICE VISIT (OUTPATIENT)
Dept: FAMILY MEDICINE CLINIC | Age: 48
End: 2018-05-14
Payer: COMMERCIAL

## 2018-05-14 VITALS
TEMPERATURE: 97.1 F | RESPIRATION RATE: 12 BRPM | BODY MASS INDEX: 41.46 KG/M2 | SYSTOLIC BLOOD PRESSURE: 130 MMHG | DIASTOLIC BLOOD PRESSURE: 82 MMHG | HEIGHT: 66 IN | HEART RATE: 81 BPM | WEIGHT: 258 LBS

## 2018-05-14 DIAGNOSIS — M94.0 COSTOCHONDRITIS: ICD-10-CM

## 2018-05-14 DIAGNOSIS — I15.0 RENOVASCULAR HYPERTENSION: ICD-10-CM

## 2018-05-14 DIAGNOSIS — D17.71 ANGIOLIPOMA OF RIGHT KIDNEY: ICD-10-CM

## 2018-05-14 DIAGNOSIS — R10.11 RUQ PAIN: Primary | ICD-10-CM

## 2018-05-14 PROCEDURE — 99213 OFFICE O/P EST LOW 20 MIN: CPT | Performed by: FAMILY MEDICINE

## 2018-05-14 RX ORDER — METHYLPREDNISOLONE 4 MG/1
TABLET ORAL
Qty: 1 KIT | Refills: 0 | Status: SHIPPED | OUTPATIENT
Start: 2018-05-14 | End: 2018-05-20

## 2018-05-14 RX ORDER — SULFAMETHOXAZOLE AND TRIMETHOPRIM 800; 160 MG/1; MG/1
TABLET ORAL
COMMUNITY
Start: 2018-05-13 | End: 2018-06-19 | Stop reason: ALTCHOICE

## 2018-06-01 ENCOUNTER — NURSE ONLY (OUTPATIENT)
Dept: FAMILY MEDICINE CLINIC | Age: 48
End: 2018-06-01

## 2018-06-01 ENCOUNTER — TELEPHONE (OUTPATIENT)
Dept: FAMILY MEDICINE CLINIC | Age: 48
End: 2018-06-01

## 2018-06-01 ENCOUNTER — TELEPHONE (OUTPATIENT)
Dept: FAMILY MEDICINE CLINIC | Age: 48
End: 2018-06-01
Payer: COMMERCIAL

## 2018-06-01 DIAGNOSIS — R30.0 DYSURIA: Primary | ICD-10-CM

## 2018-06-01 LAB
BILIRUBIN, POC: NORMAL
BLOOD URINE, POC: NORMAL
CLARITY, POC: NORMAL
COLOR, POC: NORMAL
GLUCOSE URINE, POC: NORMAL
KETONES, POC: NORMAL
LEUKOCYTE EST, POC: NORMAL
NITRITE, POC: NORMAL
PH, POC: 6
PROTEIN, POC: NORMAL
SPECIFIC GRAVITY, POC: 1.02
UROBILINOGEN, POC: NORMAL

## 2018-06-01 PROCEDURE — 81003 URINALYSIS AUTO W/O SCOPE: CPT | Performed by: FAMILY MEDICINE

## 2018-06-02 ENCOUNTER — TELEPHONE (OUTPATIENT)
Dept: FAMILY MEDICINE CLINIC | Age: 48
End: 2018-06-02

## 2018-06-02 RX ORDER — NITROFURANTOIN 25; 75 MG/1; MG/1
100 CAPSULE ORAL 2 TIMES DAILY
Qty: 14 CAPSULE | Refills: 0 | Status: SHIPPED | OUTPATIENT
Start: 2018-06-02 | End: 2018-06-09

## 2018-06-04 LAB
ORGANISM: ABNORMAL
URINE CULTURE, ROUTINE: ABNORMAL
URINE CULTURE, ROUTINE: ABNORMAL

## 2018-06-11 RX ORDER — CHLORTHALIDONE 25 MG/1
25 TABLET ORAL DAILY
Qty: 90 TABLET | Refills: 1 | Status: SHIPPED | OUTPATIENT
Start: 2018-06-11 | End: 2018-07-30

## 2018-06-19 ENCOUNTER — OFFICE VISIT (OUTPATIENT)
Dept: FAMILY MEDICINE CLINIC | Age: 48
End: 2018-06-19
Payer: COMMERCIAL

## 2018-06-19 VITALS
TEMPERATURE: 98.2 F | RESPIRATION RATE: 20 BRPM | DIASTOLIC BLOOD PRESSURE: 80 MMHG | BODY MASS INDEX: 42.38 KG/M2 | HEIGHT: 65 IN | SYSTOLIC BLOOD PRESSURE: 118 MMHG | HEART RATE: 64 BPM | WEIGHT: 254.4 LBS

## 2018-06-19 DIAGNOSIS — K21.00 GASTROESOPHAGEAL REFLUX DISEASE WITH ESOPHAGITIS: Primary | ICD-10-CM

## 2018-06-19 DIAGNOSIS — K21.00 GASTROESOPHAGEAL REFLUX DISEASE WITH ESOPHAGITIS: ICD-10-CM

## 2018-06-19 DIAGNOSIS — R19.7 DIARRHEA, UNSPECIFIED TYPE: ICD-10-CM

## 2018-06-19 DIAGNOSIS — N39.0 URINARY TRACT INFECTION WITHOUT HEMATURIA, SITE UNSPECIFIED: ICD-10-CM

## 2018-06-19 LAB
BILIRUBIN, POC: 1
BLOOD URINE, POC: NORMAL
CLARITY, POC: CLEAR
COLOR, POC: NORMAL
GLUCOSE URINE, POC: NORMAL
KETONES, POC: NORMAL
LEUKOCYTE EST, POC: NORMAL
NITRITE, POC: NORMAL
PH, POC: 6
PROTEIN, POC: NORMAL
SPECIFIC GRAVITY, POC: 1.03
UROBILINOGEN, POC: NORMAL

## 2018-06-19 PROCEDURE — 81003 URINALYSIS AUTO W/O SCOPE: CPT | Performed by: FAMILY MEDICINE

## 2018-06-19 PROCEDURE — 99214 OFFICE O/P EST MOD 30 MIN: CPT | Performed by: FAMILY MEDICINE

## 2018-06-19 RX ORDER — PANTOPRAZOLE SODIUM 40 MG/1
40 TABLET, DELAYED RELEASE ORAL DAILY
Qty: 30 TABLET | Refills: 3 | Status: SHIPPED | OUTPATIENT
Start: 2018-06-19 | End: 2018-10-19 | Stop reason: SDUPTHER

## 2018-06-21 LAB — URINE CULTURE, ROUTINE: NORMAL

## 2018-06-22 ENCOUNTER — TELEPHONE (OUTPATIENT)
Dept: FAMILY MEDICINE CLINIC | Age: 48
End: 2018-06-22

## 2018-06-22 LAB — CELIAC PANEL: 10 UNITS (ref 0–19)

## 2018-06-30 ENCOUNTER — TELEPHONE (OUTPATIENT)
Dept: FAMILY MEDICINE CLINIC | Age: 48
End: 2018-06-30

## 2018-06-30 RX ORDER — METHYLPREDNISOLONE 4 MG/1
TABLET ORAL
Qty: 1 KIT | Refills: 0 | Status: SHIPPED | OUTPATIENT
Start: 2018-06-30 | End: 2018-10-29 | Stop reason: ALTCHOICE

## 2018-07-02 ENCOUNTER — OFFICE VISIT (OUTPATIENT)
Dept: FAMILY MEDICINE CLINIC | Age: 48
End: 2018-07-02
Payer: COMMERCIAL

## 2018-07-02 VITALS
TEMPERATURE: 96.5 F | HEART RATE: 75 BPM | OXYGEN SATURATION: 96 % | HEIGHT: 65 IN | BODY MASS INDEX: 44.2 KG/M2 | RESPIRATION RATE: 18 BRPM | SYSTOLIC BLOOD PRESSURE: 138 MMHG | DIASTOLIC BLOOD PRESSURE: 82 MMHG | WEIGHT: 265.3 LBS

## 2018-07-02 DIAGNOSIS — M10.072 ACUTE IDIOPATHIC GOUT OF LEFT FOOT: Primary | ICD-10-CM

## 2018-07-02 PROCEDURE — 96372 THER/PROPH/DIAG INJ SC/IM: CPT | Performed by: FAMILY MEDICINE

## 2018-07-02 PROCEDURE — 99213 OFFICE O/P EST LOW 20 MIN: CPT | Performed by: FAMILY MEDICINE

## 2018-07-02 RX ORDER — METHYLPREDNISOLONE ACETATE 80 MG/ML
80 INJECTION, SUSPENSION INTRA-ARTICULAR; INTRALESIONAL; INTRAMUSCULAR; SOFT TISSUE ONCE
Status: SHIPPED | OUTPATIENT
Start: 2018-07-02 | End: 2038-07-03

## 2018-07-02 RX ORDER — KETOROLAC TROMETHAMINE 30 MG/ML
60 INJECTION, SOLUTION INTRAMUSCULAR; INTRAVENOUS ONCE
Status: COMPLETED | OUTPATIENT
Start: 2018-07-02 | End: 2018-07-02

## 2018-07-02 RX ORDER — PREDNISONE 10 MG/1
TABLET ORAL
Qty: 60 TABLET | Refills: 0 | Status: SHIPPED | OUTPATIENT
Start: 2018-07-02 | End: 2018-07-30 | Stop reason: ALTCHOICE

## 2018-07-02 RX ORDER — KETOROLAC TROMETHAMINE 30 MG/ML
60 INJECTION, SOLUTION INTRAMUSCULAR; INTRAVENOUS ONCE
Status: SHIPPED | OUTPATIENT
Start: 2018-07-02 | End: 2018-07-07

## 2018-07-02 RX ORDER — HYDROCODONE BITARTRATE AND ACETAMINOPHEN 5; 325 MG/1; MG/1
1 TABLET ORAL EVERY 6 HOURS PRN
Qty: 10 TABLET | Refills: 0 | Status: SHIPPED | OUTPATIENT
Start: 2018-07-02 | End: 2018-07-05

## 2018-07-02 RX ORDER — TRIAMCINOLONE ACETONIDE 40 MG/ML
80 INJECTION, SUSPENSION INTRA-ARTICULAR; INTRAMUSCULAR ONCE
Status: COMPLETED | OUTPATIENT
Start: 2018-07-02 | End: 2018-07-02

## 2018-07-02 RX ADMIN — KETOROLAC TROMETHAMINE 60 MG: 30 INJECTION, SOLUTION INTRAMUSCULAR; INTRAVENOUS at 14:11

## 2018-07-02 RX ADMIN — TRIAMCINOLONE ACETONIDE 80 MG: 40 INJECTION, SUSPENSION INTRA-ARTICULAR; INTRAMUSCULAR at 14:12

## 2018-07-04 ENCOUNTER — PATIENT MESSAGE (OUTPATIENT)
Dept: FAMILY MEDICINE CLINIC | Age: 48
End: 2018-07-04

## 2018-07-04 NOTE — PROGRESS NOTES
Subjective:      Patient ID: Cindy Munoz is a 53 y.o. female. Chief Complaint   Patient presents with    Gout     Pt presents today stating that she has a gout flare up and Dr. Srinivas Donnelly called her in a medrol dose pack which is not working states that she is still having shooting pain in her left foot and swelling. Onset of the gout started on 6/29/18        HPI    Here today follow-up on her gout states she had a gout flare with Dr. Srinivas Donnelly called in for steroid pack whether camping still having some shooting pains in left foot is frustrated     no shortness of breath chest pain nausea vomiting is time eating drinking well didn't recall a particular causes         Allergies   Allergen Reactions    Aleve [Naproxen Sodium]      Racing heart     Codeine      Other reaction(s): SEVERE STOMACH PAIN    Morphine And Related      Other reaction(s): PATIENT DOESN'T RECALL REACTION.  Adhesive Tape Rash    Avelox [Moxifloxacin Hcl In Nacl] Rash    Penicillins Rash     Outpatient Encounter Prescriptions as of 7/2/2018   Medication Sig Dispense Refill    HYDROcodone-acetaminophen (NORCO) 5-325 MG per tablet Take 1 tablet by mouth every 6 hours as needed for Pain for up to 3 days. Intended supply: 3 days. Take lowest dose possible to manage pain. 10 tablet 0    predniSONE (DELTASONE) 10 MG tablet Take 5 tabs x 4days then 4 x 4, 3 x 4, 2 x 4, 1 x 4 60 tablet 0    pantoprazole (PROTONIX) 40 MG tablet Take 1 tablet by mouth daily 30 tablet 3    chlorthalidone (HYGROTON) 25 MG tablet TAKE 1 TABLET BY MOUTH DAILY 90 tablet 1    BYSTOLIC 10 MG tablet TAKE 3 TABLETS BY MOUTH DAILY 270 tablet 1    famotidine (PEPCID) 20 MG tablet Take 1 tablet by mouth 2 times daily 60 tablet 3    chlorthalidone (HYGROTON) 25 MG tablet TAKE 1 TABLET BY MOUTH DAILY 90 tablet 1    Azilsartan Medoxomil 80 MG TABS Take 80 mg by mouth daily 30 tablet 3    methylPREDNISolone (MEDROL DOSEPACK) 4 MG tablet Take by mouth.  1 kit 0 No dysuria type symptoms. Objective:     /82 (Site: Left Arm, Position: Sitting, Cuff Size: Large Adult)   Pulse 75   Temp 96.5 °F (35.8 °C) (Temporal)   Resp 18   Ht 5' 5\" (1.651 m)   Wt 265 lb 4.8 oz (120.3 kg)   LMP 06/12/2018 (Approximate)   SpO2 96%   BMI 44.15 kg/m²     Physical Exam      O:  Alert and active female in no acute distress     Neurologic exam unremarkable. DTRs in upper and lower extremities within normal limits. Full strength noted    Skin- no lesions noted   Left foot positive erythema for second tarsals consistent with gout    Assessment:       Diagnosis Orders   1. Acute idiopathic gout of left foot  HYDROcodone-acetaminophen (NORCO) 5-325 MG per tablet    ketorolac (TORADOL) injection 60 mg    triamcinolone acetonide (KENALOG-40) injection 80 mg             Plan:        Orders Placed This Encounter   Medications    methylPREDNISolone acetate (DEPO-MEDROL) injection 80 mg    ketorolac (TORADOL) injection 60 mg    HYDROcodone-acetaminophen (NORCO) 5-325 MG per tablet     Sig: Take 1 tablet by mouth every 6 hours as needed for Pain for up to 3 days. Intended supply: 3 days. Take lowest dose possible to manage pain. Dispense:  10 tablet     Refill:  0    predniSONE (DELTASONE) 10 MG tablet     Sig: Take 5 tabs x 4days then 4 x 4, 3 x 4, 2 x 4, 1 x 4     Dispense:  60 tablet     Refill:  0    ketorolac (TORADOL) injection 60 mg    triamcinolone acetonide (KENALOG-40) injection 80 mg     No orders of the defined types were placed in this encounter. There are no preventive care reminders to display for this patient. Controlled Substances Monitoring:     No flowsheet data found.         If anything worsens or changes please call us at once , follow-up in the office as planned,

## 2018-07-07 ENCOUNTER — TELEPHONE (OUTPATIENT)
Dept: FAMILY MEDICINE CLINIC | Age: 48
End: 2018-07-07

## 2018-07-07 NOTE — TELEPHONE ENCOUNTER
No, no further shots lots of steroids make sure she is drinking lots of fluids with this keeps worsening we may have to add another medication , should improve in 48 hours

## 2018-07-11 ENCOUNTER — OFFICE VISIT (OUTPATIENT)
Dept: OBGYN CLINIC | Age: 48
End: 2018-07-11
Payer: COMMERCIAL

## 2018-07-11 VITALS
WEIGHT: 263 LBS | DIASTOLIC BLOOD PRESSURE: 100 MMHG | BODY MASS INDEX: 42.27 KG/M2 | SYSTOLIC BLOOD PRESSURE: 180 MMHG | HEIGHT: 66 IN

## 2018-07-11 DIAGNOSIS — Z11.51 SCREENING FOR HPV (HUMAN PAPILLOMAVIRUS): ICD-10-CM

## 2018-07-11 DIAGNOSIS — Z01.419 WELL WOMAN EXAM WITH ROUTINE GYNECOLOGICAL EXAM: Primary | ICD-10-CM

## 2018-07-11 DIAGNOSIS — Z12.31 SCREENING MAMMOGRAM, ENCOUNTER FOR: ICD-10-CM

## 2018-07-11 DIAGNOSIS — Z01.419 PAP SMEAR, AS PART OF ROUTINE GYNECOLOGICAL EXAMINATION: ICD-10-CM

## 2018-07-11 PROCEDURE — 99386 PREV VISIT NEW AGE 40-64: CPT | Performed by: NURSE PRACTITIONER

## 2018-07-11 NOTE — PROGRESS NOTES
appearing without lesions or masses  ANUS: normal appearing without lesions or masses, no hemorrhoids    ASSESSMENT:   Well woman with routine gynecologic exam  Breast cancer screening    PLAN: Information given and discussed in regards to menopause. Pt verbalized understanding. Pap with hpv pending  Reinforced self breast exam, mammogram ordered  Advised Folate 0.4mg, Calcium 1000 mg and Vitamin D 400-800 IU total intake daily. Encouraged healthy lifestyle and moderated exercise (30 mins walking daily). Pt verbalized understanding. Pt reports she is currently taking steroids for gout and her PCP informed her that her BP may be elevated with the therapy. Pt advised to keep follow up with PCP as directed. Routine health screenings and precautions discussed. RTC 12 months for well woman exam or visits PRN.

## 2018-07-11 NOTE — PATIENT INSTRUCTIONS
Patient Education        Well Visit, Ages 25 to 48: Care Instructions  Your Care Instructions    Physical exams can help you stay healthy. Your doctor has checked your overall health and may have suggested ways to take good care of yourself. He or she also may have recommended tests. At home, you can help prevent illness with healthy eating, regular exercise, and other steps. Follow-up care is a key part of your treatment and safety. Be sure to make and go to all appointments, and call your doctor if you are having problems. It's also a good idea to know your test results and keep a list of the medicines you take. How can you care for yourself at home? · Reach and stay at a healthy weight. This will lower your risk for many problems, such as obesity, diabetes, heart disease, and high blood pressure. · Get at least 30 minutes of physical activity on most days of the week. Walking is a good choice. You also may want to do other activities, such as running, swimming, cycling, or playing tennis or team sports. Discuss any changes in your exercise program with your doctor. · Do not smoke or allow others to smoke around you. If you need help quitting, talk to your doctor about stop-smoking programs and medicines. These can increase your chances of quitting for good. · Talk to your doctor about whether you have any risk factors for sexually transmitted infections (STIs). Having one sex partner (who does not have STIs and does not have sex with anyone else) is a good way to avoid these infections. · Use birth control if you do not want to have children at this time. Talk with your doctor about the choices available and what might be best for you. · Protect your skin from too much sun. When you're outdoors from 10 a.m. to 4 p.m., stay in the shade or cover up with clothing and a hat with a wide brim. Wear sunglasses that block UV rays.  Even when it's cloudy, put broad-spectrum sunscreen (SPF 30 or higher) on any condoms. For men  · Tests for sexually transmitted infections (STIs). Ask whether you should have tests for STIs. You may be at risk if you have sex with more than one person, especially if you do not wear a condom. · Testicular cancer exam. Ask your doctor whether you should check your testicles regularly. · Prostate exam. Talk to your doctor about whether you should have a blood test (called a PSA test) for prostate cancer. Experts differ on whether and when men should have this test. Some experts suggest it if you are older than 39 and are -American or have a father or brother who got prostate cancer when he was younger than 72. When should you call for help? Watch closely for changes in your health, and be sure to contact your doctor if you have any problems or symptoms that concern you. Where can you learn more? Go to https://chpekareneb.[email or web address] org and sign in to your [facility] account. Enter P072 in the WellnessFX box to learn more about \"Well Visit, Ages 25 to 48: Care Instructions. \"     If you do not have an account, please click on the \"Sign Up Now\" link. Current as of: May 16, 2017  Content Version: 11.6  © 0478-0325 [employer]. Care instructions adapted under license by [facility] ([facility] [place]). If you have questions about a medical condition or this instruction, always ask your healthcare professional. [place] [address] any warranty or liability for your use of this information. Patient Education        Breast Self-Exam: Care Instructions  Your Care Instructions    A breast self-exam is when you check your breasts for lumps or changes. This regular exam helps you learn how your breasts normally look and feel. Most breast problems or changes are not because of cancer.   Breast self-exam is not a substitute for a mammogram. Having regular breast exams by your doctor and regular mammograms improve your chances of finding any problems with your side.  ¨ Using the pads of your fingers, gently move your hand over your breast (in the strip pattern described above), feeling carefully for any lumps or changes. ¨ Repeat for the other breast.  · Have your doctor inspect anything you notice to see if you need further testing. Where can you learn more? Go to https://bartolo.health-partners. org and sign in to your [employer] account. Enter P148 in the [employer] box to learn more about \"Breast Self-Exam: Care Instructions. \"     If you do not have an account, please click on the \"Sign Up Now\" link. Current as of: May 12, 2017  Content Version: 11.6  © 1894-6847 [employer]. Care instructions adapted under license by [facility] ([facility]). If you have questions about a medical condition or this instruction, always ask your healthcare professional. [place] [address] any warranty or liability for your use of this information. Patient Education        Learning About Pap Tests  What is a Pap test?    The Pap test (also called a Pap smear) is a screening test for cancer of the cervix, which is the lower part of the uterus that opens into the vagina. The test can help your doctor find early changes in the cells that could lead to cancer. During the test, the doctor or nurse will insert a tool called a speculum into your vagina. The speculum gently spreads apart the vaginal walls. That allows your doctor to see inside the vagina and the cervix. He or she uses a cotton swab or brush to collect cell samples from your cervix. Try to schedule the test when you're not having your period. To get ready for a Pap test, avoid douches, tampons, vaginal medicines, sprays, or powders for at least a day before you have the test.  When should you have a Pap test?  Women should start having Pap tests at age 24.  If you are younger than 24 and are sexually active, it's still a good idea to have regular testing for sexually transmitted cancer. The results are ranked based on how serious the changes might be. Where can you learn more? Go to https://chpepiceweb.[email or web address] org and sign in to your [employer] account. Enter P919 in the [facility] box to learn more about \"Learning About Pap Tests. \"     If you do not have an account, please click on the \"Sign Up Now\" link. Current as of: May 12, 2017  Content Version: 11.6  © 1907-5857 [employer]. Care instructions adapted under license by [facility] ([facility]). If you have questions about a medical condition or this instruction, always ask your healthcare professional. [address] any warranty or liability for your use of this information. Patient Education        High Blood Pressure: Care Instructions  Your Care Instructions    If your blood pressure is usually above 130/80, you have high blood pressure, or hypertension. That means the top number is 130 or higher or the bottom number is 80 or higher, or both. Despite what a lot of people think, high blood pressure usually doesn't cause headaches or make you feel dizzy or lightheaded. It usually has no symptoms. But it does increase your risk for heart attack, stroke, and kidney or eye damage. The higher your blood pressure, the more your risk increases. Your doctor will give you a goal for your blood pressure. Your goal will be based on your health and your age. Lifestyle changes, such as eating healthy and being active, are always important to help lower blood pressure. You might also take medicine to reach your blood pressure goal.  Follow-up care is a key part of your treatment and safety. Be sure to make and go to all appointments, and call your doctor if you are having problems. It's also a good idea to know your test results and keep a list of the medicines you take. How can you care for yourself at home?   Medical treatment  · If you stop taking your medicine, your blood pressure will go Where can you learn more? Go to https://chpepiceweb.[email or web address] org and sign in to your [employer] account. Enter U912 in the [employer] box to learn more about \"High Blood Pressure: Care Instructions. \"     If you do not have an account, please click on the \"Sign Up Now\" link. Current as of: December 6, 2017  Content Version: 11.6  © 20060014-6522 [employer]. Care instructions adapted under license by [facility] ([facility]). If you have questions about a medical condition or this instruction, always ask your healthcare professional. [place] [address] any warranty or liability for your use of this information. Patient Education        Learning About Menopause  What is menopause? For most women, menopause is a natural process of aging. Menstrual periods gradually stop. The ability to become pregnant ends. Some women feel relief that their childbearing years are ending. But other women struggle with the physical and emotional changes that come with menopause. For most women, menopause happens around age 48. But every woman's body has its own timeline. Some women stop having periods in their mid-40s. Others keep having periods well into their 50s. And some women go through menopause early because of cancer treatment or surgery to remove the ovaries. What can you expect with menopause? · It starts with perimenopause. This is the process of change that leads up to menopause. Perimenopause can start as early as your late 35s or as late as your early 46s. How long it lasts varies. But it usually lasts from 2 to 8 years. · During this time, your hormone levels will go up and down unevenly (fluctuate). This causes changes in your periods and other symptoms. In time, estrogen and progesterone levels drop enough that the menstrual cycle stops. Going a full year without having a period is usually considered menopause. · Low estrogen levels after menopause speed bone loss.  This caffeine, alcohol, and stress. These things can make symptoms worse. Limiting them may help you sleep better.     · If you smoke, stop. Quitting smoking can reduce hot flashes and long-term health risks. Medicines   If your symptoms are severe, talk with your doctor. You may want to try prescription medicines, such as:    · Low-dose birth control pills before menopause.     · Low-dose hormone therapy (HT) after menopause.     · Antidepressants.     · A medicine called clonidine (Catapres) that is usually used to treat high blood pressure.    All medicines for menopause symptoms have possible risks or side effects. A very small number of women develop serious health problems when taking hormone therapy. Be sure to talk to your doctor about your possible health risks before you start a treatment for menopause symptoms.   Other treatments   You can try:    · Breathing exercises. They may help reduce hot flashes and emotional symptoms.     · Soy. Some women feel that eating lots of soy helps even out their menopause symptoms.     · Yoga or biofeedback. They may help reduce stress. Follow-up care is a key part of your treatment and safety. Be sure to make and go to all appointments, and call your doctor if you are having problems. It's also a good idea to know your test results and keep a list of the medicines you take. Where can you learn more? Go to https://SeratispeTaoTaoSou.uTaP. org and sign in to your [employer] account. Enter H199 in the [facility] box to learn more about \"Learning About Menopause. \"     If you do not have an account, please click on the \"Sign Up Now\" link. Current as of: October 6, 2017  Content Version: 11.6  © 0277-9309 [employer]. Care instructions adapted under license by [address]  If you have questions about a medical condition or this instruction, always ask your healthcare professional. [address] any warranty or liability for

## 2018-07-24 RX ORDER — ALLOPURINOL 300 MG/1
300 TABLET ORAL DAILY
Qty: 30 TABLET | Refills: 3 | Status: SHIPPED | OUTPATIENT
Start: 2018-07-24 | End: 2018-07-27 | Stop reason: SDUPTHER

## 2018-07-25 DIAGNOSIS — K21.9 GASTROESOPHAGEAL REFLUX DISEASE, ESOPHAGITIS PRESENCE NOT SPECIFIED: Primary | ICD-10-CM

## 2018-07-25 DIAGNOSIS — Z11.51 SCREENING FOR HPV (HUMAN PAPILLOMAVIRUS): ICD-10-CM

## 2018-07-25 DIAGNOSIS — Z01.419 PAP SMEAR, AS PART OF ROUTINE GYNECOLOGICAL EXAMINATION: ICD-10-CM

## 2018-07-25 DIAGNOSIS — Z01.419 WELL WOMAN EXAM WITH ROUTINE GYNECOLOGICAL EXAM: ICD-10-CM

## 2018-07-25 RX ORDER — FAMOTIDINE 20 MG/1
20 TABLET, FILM COATED ORAL 2 TIMES DAILY
Qty: 60 TABLET | Refills: 3 | Status: SHIPPED | OUTPATIENT
Start: 2018-07-25 | End: 2018-10-29 | Stop reason: ALTCHOICE

## 2018-07-26 ENCOUNTER — TELEPHONE (OUTPATIENT)
Dept: OBGYN CLINIC | Age: 48
End: 2018-07-26

## 2018-07-27 RX ORDER — ALLOPURINOL 300 MG/1
300 TABLET ORAL DAILY
Qty: 90 TABLET | Refills: 1 | Status: SHIPPED | OUTPATIENT
Start: 2018-07-27

## 2018-07-30 ENCOUNTER — OFFICE VISIT (OUTPATIENT)
Dept: FAMILY MEDICINE CLINIC | Age: 48
End: 2018-07-30
Payer: COMMERCIAL

## 2018-07-30 VITALS
BODY MASS INDEX: 41.71 KG/M2 | HEART RATE: 84 BPM | TEMPERATURE: 97.9 F | OXYGEN SATURATION: 96 % | SYSTOLIC BLOOD PRESSURE: 138 MMHG | WEIGHT: 259.56 LBS | DIASTOLIC BLOOD PRESSURE: 86 MMHG | RESPIRATION RATE: 12 BRPM | HEIGHT: 66 IN

## 2018-07-30 DIAGNOSIS — M10.471 GOUT DUE TO OTHER SECONDARY CAUSE INVOLVING TOE OF RIGHT FOOT, UNSPECIFIED CHRONICITY: Primary | ICD-10-CM

## 2018-07-30 PROCEDURE — 96372 THER/PROPH/DIAG INJ SC/IM: CPT | Performed by: FAMILY MEDICINE

## 2018-07-30 PROCEDURE — 99213 OFFICE O/P EST LOW 20 MIN: CPT | Performed by: FAMILY MEDICINE

## 2018-07-30 RX ORDER — KETOROLAC TROMETHAMINE 30 MG/ML
60 INJECTION, SOLUTION INTRAMUSCULAR; INTRAVENOUS ONCE
Status: COMPLETED | OUTPATIENT
Start: 2018-07-30 | End: 2018-07-30

## 2018-07-30 RX ORDER — TRIAMCINOLONE ACETONIDE 40 MG/ML
80 INJECTION, SUSPENSION INTRA-ARTICULAR; INTRAMUSCULAR ONCE
Status: COMPLETED | OUTPATIENT
Start: 2018-07-30 | End: 2018-07-30

## 2018-07-30 RX ORDER — COLCHICINE 0.6 MG/1
0.6 TABLET ORAL 2 TIMES DAILY
Qty: 60 TABLET | Refills: 3 | Status: SHIPPED | OUTPATIENT
Start: 2018-07-30 | End: 2020-10-22

## 2018-07-30 RX ADMIN — TRIAMCINOLONE ACETONIDE 80 MG: 40 INJECTION, SUSPENSION INTRA-ARTICULAR; INTRAMUSCULAR at 12:20

## 2018-07-30 RX ADMIN — KETOROLAC TROMETHAMINE 60 MG: 30 INJECTION, SOLUTION INTRAMUSCULAR; INTRAVENOUS at 12:21

## 2018-07-30 ASSESSMENT — PATIENT HEALTH QUESTIONNAIRE - PHQ9
SUM OF ALL RESPONSES TO PHQ9 QUESTIONS 1 & 2: 0
2. FEELING DOWN, DEPRESSED OR HOPELESS: 0
1. LITTLE INTEREST OR PLEASURE IN DOING THINGS: 0
SUM OF ALL RESPONSES TO PHQ QUESTIONS 1-9: 0

## 2018-07-30 NOTE — PROGRESS NOTES
Facility-Administered Encounter Medications as of 7/30/2018   Medication Dose Route Frequency Provider Last Rate Last Dose    [COMPLETED] triamcinolone acetonide (KENALOG-40) injection 80 mg  80 mg Intramuscular Once Dorsie Ahle, DO   80 mg at 07/30/18 1220    [COMPLETED] ketorolac (TORADOL) injection 60 mg  60 mg Intramuscular Once Dorsie Ahle, DO   60 mg at 07/30/18 1221    methylPREDNISolone acetate (DEPO-MEDROL) injection 80 mg  80 mg Intramuscular Once Dorsie Ahle, DO        triamcinolone acetonide (KENALOG-40) injection 80 mg  80 mg Intramuscular Once Dorsie Ahle, DO         Social History     Social History    Marital status:      Spouse name: N/A    Number of children: N/A    Years of education: N/A     Occupational History    Not on file. Social History Main Topics    Smoking status: Never Smoker    Smokeless tobacco: Never Used    Alcohol use No    Drug use: No    Sexual activity: Yes     Partners: Male     Other Topics Concern    Not on file     Social History Narrative    No narrative on file     Family History   Problem Relation Age of Onset    High Blood Pressure Mother     High Blood Pressure Father      Past Medical History:   Diagnosis Date    Angiolipoma of right kidney 5/14/2018    Anxiety     CAD (coronary artery disease)     Hypertension     Somatic dysfunction of lumbar region      Past Surgical History:   Procedure Laterality Date    CARDIAC CATHETERIZATION      CARPAL TUNNEL RELEASE  01/03/14     DR FORD    HERNIA REPAIR           REVIEW OF SYSTEMS:   REVIEW OF SYSTEMS:   Patient seen today for exam.  Denies any problems with hearing, headaches or vision. Denies any shortness of breath, chest pain, nausea or vomiting. No black stool, no blood in the stool. No heartburn. Denies any problems with constipation or diarrhea either. No dysuria type symptoms.           Objective:     /86 (Site: Left Arm, Position: Sitting, Cuff Size:

## 2018-07-31 ENCOUNTER — TELEPHONE (OUTPATIENT)
Dept: FAMILY MEDICINE CLINIC | Age: 48
End: 2018-07-31

## 2018-07-31 DIAGNOSIS — M10.072 ACUTE IDIOPATHIC GOUT OF LEFT FOOT: ICD-10-CM

## 2018-07-31 RX ORDER — HYDROCODONE BITARTRATE AND ACETAMINOPHEN 5; 325 MG/1; MG/1
1 TABLET ORAL EVERY 6 HOURS PRN
Qty: 12 TABLET | Refills: 0 | Status: SHIPPED | OUTPATIENT
Start: 2018-07-31 | End: 2018-08-03

## 2018-07-31 RX ORDER — HYDROCODONE BITARTRATE AND ACETAMINOPHEN 5; 325 MG/1; MG/1
1 TABLET ORAL EVERY 6 HOURS PRN
Qty: 10 TABLET | Refills: 0 | Status: CANCELLED | OUTPATIENT
Start: 2018-07-31 | End: 2018-08-03

## 2018-07-31 NOTE — TELEPHONE ENCOUNTER
Patient was seen yesterday by Dr. John Hollins for Gout. She states she is in severe pain, and needs something. Has been on Norco. I pended that if appropriate. Per Emanuel. .. Please advise. Also I'm in so much pain and he said that he could give me pain meds.   I really need those called in like a.s.a.p. he said something like  Percocet  We go to [facility]

## 2018-08-02 RX ORDER — PREDNISONE 10 MG/1
TABLET ORAL
Qty: 40 TABLET | Refills: 0 | Status: SHIPPED | OUTPATIENT
Start: 2018-08-02 | End: 2018-08-14 | Stop reason: SDUPTHER

## 2018-08-15 RX ORDER — PREDNISONE 10 MG/1
TABLET ORAL
Qty: 40 TABLET | Refills: 0 | Status: SHIPPED | OUTPATIENT
Start: 2018-08-15 | End: 2018-10-29 | Stop reason: ALTCHOICE

## 2018-09-24 ENCOUNTER — TELEPHONE (OUTPATIENT)
Dept: FAMILY MEDICINE CLINIC | Age: 48
End: 2018-09-24

## 2018-09-24 RX ORDER — METHYLPREDNISOLONE 4 MG/1
TABLET ORAL
Qty: 1 KIT | Refills: 0 | Status: SHIPPED | OUTPATIENT
Start: 2018-09-24 | End: 2018-10-29 | Stop reason: SDUPTHER

## 2018-10-12 ENCOUNTER — TELEPHONE (OUTPATIENT)
Dept: FAMILY MEDICINE CLINIC | Age: 48
End: 2018-10-12

## 2018-10-12 RX ORDER — PREDNISONE 10 MG/1
TABLET ORAL
Qty: 40 TABLET | Refills: 0 | Status: SHIPPED | OUTPATIENT
Start: 2018-10-12 | End: 2018-10-29 | Stop reason: ALTCHOICE

## 2018-10-19 RX ORDER — PANTOPRAZOLE SODIUM 40 MG/1
TABLET, DELAYED RELEASE ORAL
Qty: 30 TABLET | Refills: 3 | Status: SHIPPED | OUTPATIENT
Start: 2018-10-19 | End: 2020-10-22

## 2018-10-22 RX ORDER — ALLOPURINOL 300 MG/1
TABLET ORAL
Qty: 30 TABLET | Refills: 3 | Status: SHIPPED | OUTPATIENT
Start: 2018-10-22 | End: 2018-10-29 | Stop reason: SDUPTHER

## 2018-10-29 ENCOUNTER — OFFICE VISIT (OUTPATIENT)
Dept: FAMILY MEDICINE CLINIC | Age: 48
End: 2018-10-29
Payer: COMMERCIAL

## 2018-10-29 VITALS
OXYGEN SATURATION: 98 % | HEART RATE: 81 BPM | BODY MASS INDEX: 42.85 KG/M2 | DIASTOLIC BLOOD PRESSURE: 108 MMHG | TEMPERATURE: 97.1 F | SYSTOLIC BLOOD PRESSURE: 170 MMHG | WEIGHT: 265.5 LBS

## 2018-10-29 DIAGNOSIS — M79.671 FOOT PAIN, BILATERAL: ICD-10-CM

## 2018-10-29 DIAGNOSIS — M79.672 FOOT PAIN, BILATERAL: ICD-10-CM

## 2018-10-29 DIAGNOSIS — M10.9 ACUTE GOUT OF LEFT FOOT, UNSPECIFIED CAUSE: Primary | ICD-10-CM

## 2018-10-29 DIAGNOSIS — I15.0 RENOVASCULAR HYPERTENSION: ICD-10-CM

## 2018-10-29 LAB — URIC ACID, SERUM: 6.1 MG/DL (ref 2.4–5.7)

## 2018-10-29 PROCEDURE — 96372 THER/PROPH/DIAG INJ SC/IM: CPT | Performed by: FAMILY MEDICINE

## 2018-10-29 PROCEDURE — 99213 OFFICE O/P EST LOW 20 MIN: CPT | Performed by: FAMILY MEDICINE

## 2018-10-29 RX ORDER — METHYLPREDNISOLONE 4 MG/1
TABLET ORAL
Qty: 1 KIT | Refills: 0 | Status: SHIPPED | OUTPATIENT
Start: 2018-10-29 | End: 2018-11-04

## 2018-10-29 RX ORDER — TRIAMCINOLONE ACETONIDE 40 MG/ML
80 INJECTION, SUSPENSION INTRA-ARTICULAR; INTRAMUSCULAR ONCE
Status: COMPLETED | OUTPATIENT
Start: 2018-10-29 | End: 2018-10-29

## 2018-10-29 RX ORDER — KETOROLAC TROMETHAMINE 30 MG/ML
60 INJECTION, SOLUTION INTRAMUSCULAR; INTRAVENOUS ONCE
Status: COMPLETED | OUTPATIENT
Start: 2018-10-29 | End: 2018-10-29

## 2018-10-29 RX ADMIN — TRIAMCINOLONE ACETONIDE 80 MG: 40 INJECTION, SUSPENSION INTRA-ARTICULAR; INTRAMUSCULAR at 12:32

## 2018-10-29 RX ADMIN — KETOROLAC TROMETHAMINE 60 MG: 30 INJECTION, SOLUTION INTRAMUSCULAR; INTRAVENOUS at 12:31

## 2018-10-29 NOTE — PROGRESS NOTES
Subjective:      Patient ID: Roseanna Cadena is a 53 y.o. female. Chief Complaint   Patient presents with    Gout     X 1 Day, Left is worse, right is only in the big toe. Swelliing any redness are present. HPI    Gout flare up x1 day. Left foot. Stopped water pill. Pt was on feet yesterday a lot. No other joint swelling. Last steroid dosage x2-3 weeks ago. Was feeling good until this point. Hurts to push and pull left foot. On allopurinol 300 mg currently. Will check uric levels first and then make a plan for f/u. Bp: 156/100  Allergies   Allergen Reactions    Aleve [Naproxen Sodium]      Racing heart     Codeine      Other reaction(s): SEVERE STOMACH PAIN    Morphine And Related      Other reaction(s): PATIENT DOESN'T RECALL REACTION.     Adhesive Tape Rash    Avelox [Moxifloxacin Hcl In Nacl] Rash    Penicillins Rash     Outpatient Encounter Prescriptions as of 10/29/2018   Medication Sig Dispense Refill    methylPREDNISolone (MEDROL DOSEPACK) 4 MG tablet As directed 1 kit 0    pantoprazole (PROTONIX) 40 MG tablet TAKE 1 TABLET BY MOUTH EVERY DAY 30 tablet 3    allopurinol (ZYLOPRIM) 300 MG tablet Take 1 tablet by mouth daily 90 tablet 1    BYSTOLIC 10 MG tablet TAKE 3 TABLETS BY MOUTH DAILY 270 tablet 1    Azilsartan Medoxomil 80 MG TABS Take 80 mg by mouth daily 30 tablet 3    [DISCONTINUED] allopurinol (ZYLOPRIM) 300 MG tablet TAKE 1 TABLET BY MOUTH EVERY DAY 30 tablet 3    [DISCONTINUED] predniSONE (DELTASONE) 10 MG tablet 4 x 4, 3 x 4, 2 x 4, 1 x 4 40 tablet 0    [DISCONTINUED] predniSONE (DELTASONE) 10 MG tablet TAKE 4 TABLETS BY MOUTH DAILY X 4 DAYS, THEN 3 TABS X 4 DAYS, 2 TABS X 4 DAYS, 1 TAB X 4 DAYS 40 tablet 0    colchicine (COLCRYS) 0.6 MG tablet Take 1 tablet by mouth 2 times daily 60 tablet 3    [DISCONTINUED] famotidine (PEPCID) 20 MG tablet TAKE 1 TABLET BY MOUTH 2 TIMES DAILY 60 tablet 3    [DISCONTINUED] methylPREDNISolone (MEDROL DOSEPACK) 4 MG tablet Take by Wt 265 lb 8 oz (120.4 kg)   LMP 09/29/2018 (Approximate)   SpO2 98%   BMI 42.85 kg/m²     Physical Exam        O:  Alert and active female in no acute distress  HEENT:  TMs clear. Pharynx neg. Nares clear, no drainage noted  Neck supple/ no adenopathy   HEART:  RRR without murmur/ no carotid bruits  LUNGS:  Clear to auscultation bilaterally, no wheeze or rhonchi noted  THYROID: neg masses or nodularity  ABDOMEN:  Soft x4. Bowel sounds positive. No masses or organomegaly,  Negative tenderness, guarding or rebound. EXTR:  Without edema./ good pulses bilat    Neurologic exam unremarkable. DTRs in upper and lower extremities within normal limits. Full strength noted    Skin- no lesions noted   Left foot neurovascular intact positive tenderness over the big toe consistent with gout and redness    Assessment:       Diagnosis Orders   1. Acute gout of left foot, unspecified cause     2. Foot pain, bilateral  Uric Acid   3. Renovascular hypertension               Plan:        Orders Placed This Encounter   Medications    triamcinolone acetonide (KENALOG-40) injection 80 mg    ketorolac (TORADOL) injection 60 mg    methylPREDNISolone (MEDROL DOSEPACK) 4 MG tablet     Sig: As directed     Dispense:  1 kit     Refill:  0     Orders Placed This Encounter   Procedures    Uric Acid     Standing Status:   Future     Number of Occurrences:   1     Standing Expiration Date:   10/29/2019           Health Maintenance Due   Topic Date Due    DTaP/Tdap/Td vaccine (1 - Tdap) 04/23/2008    Flu vaccine (1) 09/01/2018      we gave her the shots here today we'll see where uric acid is        As she has been taking allopurinol she may be a good candidate for your caloric we gave her steroid pack it should worsen next week to take        Controlled Substances Monitoring:     No flowsheet data found. Charles WOODRUFF am scribing for and in the presence of Verzulema Mccrary DO.  Electronically signed by :  Rosibel Whittington Zoila

## 2018-11-02 ENCOUNTER — TELEPHONE (OUTPATIENT)
Dept: FAMILY MEDICINE CLINIC | Age: 48
End: 2018-11-02

## 2018-11-14 ENCOUNTER — TELEPHONE (OUTPATIENT)
Dept: FAMILY MEDICINE CLINIC | Age: 48
End: 2018-11-14

## 2018-11-14 NOTE — TELEPHONE ENCOUNTER
Pt aware. Pt states she already has colchicine (.6 mg) from summer. Would you like her to start taking that or call in a new RX?     Please advise

## 2018-11-16 ENCOUNTER — TELEPHONE (OUTPATIENT)
Dept: FAMILY MEDICINE CLINIC | Age: 48
End: 2018-11-16

## 2018-11-19 NOTE — TELEPHONE ENCOUNTER
Wrong medication called into the pharm, she needs to have the Azilsartan Medoxomil 80 MG TABS    plain not with water pill added.

## 2018-11-25 ENCOUNTER — OFFICE VISIT (OUTPATIENT)
Dept: PRIMARY CARE CLINIC | Age: 48
End: 2018-11-25
Payer: COMMERCIAL

## 2018-11-25 VITALS
SYSTOLIC BLOOD PRESSURE: 132 MMHG | DIASTOLIC BLOOD PRESSURE: 88 MMHG | OXYGEN SATURATION: 96 % | WEIGHT: 266 LBS | TEMPERATURE: 97.7 F | RESPIRATION RATE: 14 BRPM | BODY MASS INDEX: 42.75 KG/M2 | HEIGHT: 66 IN | HEART RATE: 77 BPM

## 2018-11-25 DIAGNOSIS — R05.9 COUGH: ICD-10-CM

## 2018-11-25 DIAGNOSIS — J01.00 ACUTE NON-RECURRENT MAXILLARY SINUSITIS: Primary | ICD-10-CM

## 2018-11-25 PROCEDURE — 99213 OFFICE O/P EST LOW 20 MIN: CPT | Performed by: PHYSICIAN ASSISTANT

## 2018-11-25 RX ORDER — CEFDINIR 300 MG/1
300 CAPSULE ORAL 2 TIMES DAILY
Qty: 20 CAPSULE | Refills: 0 | Status: SHIPPED | OUTPATIENT
Start: 2018-11-25 | End: 2018-12-04 | Stop reason: ALTCHOICE

## 2018-11-25 RX ORDER — BENZONATATE 200 MG/1
200 CAPSULE ORAL 3 TIMES DAILY PRN
Qty: 30 CAPSULE | Refills: 0 | Status: SHIPPED | OUTPATIENT
Start: 2018-11-25 | End: 2020-10-22

## 2018-11-25 ASSESSMENT — ENCOUNTER SYMPTOMS
SINUS PAIN: 1
SORE THROAT: 0
DIARRHEA: 0
VOMITING: 0
COUGH: 1
SWOLLEN GLANDS: 0
RHINORRHEA: 0
NAUSEA: 0

## 2018-11-25 NOTE — PROGRESS NOTES
(EDARBYCLOR) 40-12.5 MG TABS Take 2 tablets by mouth daily 42 tablet 0    pantoprazole (PROTONIX) 40 MG tablet TAKE 1 TABLET BY MOUTH EVERY DAY 30 tablet 3    colchicine (COLCRYS) 0.6 MG tablet Take 1 tablet by mouth 2 times daily 60 tablet 3    allopurinol (ZYLOPRIM) 300 MG tablet Take 1 tablet by mouth daily 90 tablet 1    BYSTOLIC 10 MG tablet TAKE 3 TABLETS BY MOUTH DAILY 270 tablet 1     Current Facility-Administered Medications   Medication Dose Route Frequency Provider Last Rate Last Dose    methylPREDNISolone acetate (DEPO-MEDROL) injection 80 mg  80 mg Intramuscular Once [place] Rook, DO        triamcinolone acetonide (KENALOG-40) injection 80 mg  80 mg Intramuscular Once [place] Rook, DO           Review of Systems   Constitutional: Negative for chills and fever. HENT: Positive for congestion, ear pain and sinus pain. Negative for rhinorrhea, sneezing and sore throat. Respiratory: Positive for cough. Cardiovascular: Negative for chest pain. Gastrointestinal: Negative for diarrhea, nausea and vomiting. Genitourinary: Negative for dysuria. Musculoskeletal: Negative for neck pain. Skin: Negative for rash. Objective    Vitals:    11/25/18 1121   BP: 132/88   Site: Right Upper Arm   Position: Sitting   Cuff Size: Medium Adult   Pulse: 77   Resp: 14   Temp: 97.7 °F (36.5 °C)   TempSrc: Tympanic   SpO2: 96%   Weight: 266 lb (120.7 kg)   Height: 5' 6\" (1.676 m)       Physical Exam   Constitutional: She appears well-developed and well-nourished. No distress. HENT:   Head: Normocephalic and atraumatic. Right Ear: External ear and ear canal normal. Tympanic membrane is not erythematous. Left Ear: External ear and ear canal normal. Tympanic membrane is not erythematous. Nose: Mucosal edema present. No rhinorrhea. Right sinus exhibits maxillary sinus tenderness. Right sinus exhibits no frontal sinus tenderness. Left sinus exhibits maxillary sinus tenderness.  Left sinus exhibits

## 2018-12-04 ENCOUNTER — OFFICE VISIT (OUTPATIENT)
Dept: PRIMARY CARE CLINIC | Age: 48
End: 2018-12-04
Payer: COMMERCIAL

## 2018-12-04 VITALS
WEIGHT: 267 LBS | DIASTOLIC BLOOD PRESSURE: 98 MMHG | SYSTOLIC BLOOD PRESSURE: 132 MMHG | TEMPERATURE: 99 F | HEART RATE: 74 BPM | HEIGHT: 66 IN | BODY MASS INDEX: 42.91 KG/M2 | OXYGEN SATURATION: 98 % | RESPIRATION RATE: 16 BRPM

## 2018-12-04 DIAGNOSIS — A46 ERYSIPELAS: Primary | ICD-10-CM

## 2018-12-04 DIAGNOSIS — S00.81XA ABRASION OF FACE, INITIAL ENCOUNTER: ICD-10-CM

## 2018-12-04 PROCEDURE — 99213 OFFICE O/P EST LOW 20 MIN: CPT | Performed by: NURSE PRACTITIONER

## 2018-12-04 RX ORDER — CEFDINIR 300 MG/1
600 CAPSULE ORAL DAILY
Qty: 20 CAPSULE | Refills: 0 | Status: SHIPPED | OUTPATIENT
Start: 2018-12-04 | End: 2018-12-14

## 2018-12-04 ASSESSMENT — ENCOUNTER SYMPTOMS
COLOR CHANGE: 1
FACIAL SWELLING: 1

## 2018-12-04 NOTE — PROGRESS NOTES
mupirocin (BACTROBAN) 2 % ointment       Plan:      No orders of the defined types were placed in this encounter. Orders Placed This Encounter   Medications    cefdinir (OMNICEF) 300 MG capsule     Sig: Take 2 capsules by mouth daily for 10 days     Dispense:  20 capsule     Refill:  0    mupirocin (BACTROBAN) 2 % ointment     Sig: Apply topically 3 times daily. Dispense:  30 g     Refill:  0       Return if symptoms worsen or fail to improve. Encouraged washing with soap and water. Medication as prescribed. Follow up if not improving or worsening. After pt left I noticed her tetanus was just out of date. I called and informed her and she plans on coming in 12/5 after work to get a tetanus booster. Reviewed with the patient: current clinicalstatus, medications, activities and diet. Side effects, adverse effects of the medication prescribedtoday, as well as treatment plan/ rationale and result expectations have been discussedwith the patient who expresses understanding and desires to proceed. Close follow upto evaluate treatment results and for coordination of care. I have reviewedthe patient's medical history in detail and updated the computerized patient record.     Adonis Park, WENDY - CNP

## 2018-12-06 ENCOUNTER — OFFICE VISIT (OUTPATIENT)
Dept: FAMILY MEDICINE CLINIC | Age: 48
End: 2018-12-06
Payer: COMMERCIAL

## 2018-12-06 VITALS
HEIGHT: 66 IN | HEART RATE: 80 BPM | WEIGHT: 267.2 LBS | RESPIRATION RATE: 20 BRPM | DIASTOLIC BLOOD PRESSURE: 80 MMHG | BODY MASS INDEX: 42.94 KG/M2 | SYSTOLIC BLOOD PRESSURE: 138 MMHG | TEMPERATURE: 97.2 F

## 2018-12-06 DIAGNOSIS — L08.9: Primary | ICD-10-CM

## 2018-12-06 DIAGNOSIS — L03.211 CELLULITIS OF FACE: ICD-10-CM

## 2018-12-06 DIAGNOSIS — H10.9 CONJUNCTIVITIS OF RIGHT EYE, UNSPECIFIED CONJUNCTIVITIS TYPE: ICD-10-CM

## 2018-12-06 DIAGNOSIS — S10.81XD: Primary | ICD-10-CM

## 2018-12-06 PROCEDURE — 90715 TDAP VACCINE 7 YRS/> IM: CPT | Performed by: FAMILY MEDICINE

## 2018-12-06 PROCEDURE — 99213 OFFICE O/P EST LOW 20 MIN: CPT | Performed by: FAMILY MEDICINE

## 2018-12-06 PROCEDURE — 90471 IMMUNIZATION ADMIN: CPT | Performed by: FAMILY MEDICINE

## 2018-12-06 RX ORDER — SULFAMETHOXAZOLE AND TRIMETHOPRIM 800; 160 MG/1; MG/1
1 TABLET ORAL 2 TIMES DAILY
Qty: 20 TABLET | Refills: 0 | Status: SHIPPED | OUTPATIENT
Start: 2018-12-06 | End: 2018-12-16

## 2018-12-06 RX ORDER — TOBRAMYCIN 3 MG/ML
1 SOLUTION/ DROPS OPHTHALMIC EVERY 4 HOURS
Qty: 1 BOTTLE | Refills: 0 | Status: SHIPPED | OUTPATIENT
Start: 2018-12-06 | End: 2018-12-16

## 2018-12-06 ASSESSMENT — ENCOUNTER SYMPTOMS
EYE DISCHARGE: 1
FACIAL SWELLING: 1
DIARRHEA: 0
EYE REDNESS: 1
ABDOMINAL PAIN: 0
EYE ITCHING: 0
SORE THROAT: 0
SINUS PRESSURE: 0
SHORTNESS OF BREATH: 0
CONSTIPATION: 0
CHEST TIGHTNESS: 0

## 2018-12-10 DIAGNOSIS — I15.0 RENOVASCULAR HYPERTENSION: ICD-10-CM

## 2018-12-10 RX ORDER — NEBIVOLOL 10 MG/1
TABLET ORAL
Qty: 28 TABLET | Refills: 0 | COMMUNITY
Start: 2018-12-10 | End: 2021-12-02

## 2018-12-15 ENCOUNTER — OFFICE VISIT (OUTPATIENT)
Dept: FAMILY MEDICINE CLINIC | Age: 48
End: 2018-12-15
Payer: COMMERCIAL

## 2018-12-15 VITALS
HEIGHT: 66 IN | SYSTOLIC BLOOD PRESSURE: 138 MMHG | RESPIRATION RATE: 22 BRPM | WEIGHT: 270.4 LBS | BODY MASS INDEX: 43.46 KG/M2 | TEMPERATURE: 98.5 F | DIASTOLIC BLOOD PRESSURE: 86 MMHG | HEART RATE: 78 BPM | OXYGEN SATURATION: 100 %

## 2018-12-15 DIAGNOSIS — J20.9 ACUTE BRONCHITIS, UNSPECIFIED ORGANISM: ICD-10-CM

## 2018-12-15 DIAGNOSIS — R09.81 CONGESTION OF NASAL SINUS: Primary | ICD-10-CM

## 2018-12-15 DIAGNOSIS — B96.89 ACUTE BACTERIAL SINUSITIS: ICD-10-CM

## 2018-12-15 DIAGNOSIS — J01.90 ACUTE BACTERIAL SINUSITIS: ICD-10-CM

## 2018-12-15 PROCEDURE — 99213 OFFICE O/P EST LOW 20 MIN: CPT | Performed by: FAMILY MEDICINE

## 2018-12-15 RX ORDER — METHYLPREDNISOLONE 4 MG/1
TABLET ORAL
Qty: 1 KIT | Refills: 0 | Status: SHIPPED | OUTPATIENT
Start: 2018-12-15 | End: 2019-03-21 | Stop reason: SDUPTHER

## 2018-12-15 RX ORDER — CEFDINIR 300 MG/1
300 CAPSULE ORAL 2 TIMES DAILY
Qty: 20 CAPSULE | Refills: 0 | Status: SHIPPED | OUTPATIENT
Start: 2018-12-15 | End: 2020-10-22

## 2018-12-15 ASSESSMENT — ENCOUNTER SYMPTOMS
SINUS PAIN: 1
SORE THROAT: 1
COUGH: 1
SINUS PRESSURE: 1
CHEST TIGHTNESS: 0
SHORTNESS OF BREATH: 0

## 2018-12-18 ENCOUNTER — TELEPHONE (OUTPATIENT)
Dept: FAMILY MEDICINE CLINIC | Age: 48
End: 2018-12-18

## 2018-12-18 RX ORDER — ALBUTEROL SULFATE 90 UG/1
2 AEROSOL, METERED RESPIRATORY (INHALATION) EVERY 4 HOURS PRN
Qty: 1 INHALER | Refills: 3 | Status: SHIPPED | OUTPATIENT
Start: 2018-12-18 | End: 2021-12-02

## 2018-12-18 NOTE — TELEPHONE ENCOUNTER
Pt is calling in regards to the message she had left for Dr Angela Smith yesterday and his response:    Patient calling, she was into see you on Saturday for congestion and bronchitis. Patient states that her cough has settled but she is now wheezing. She is using the humidifier but she seems to be really dried up. She is wanting to know what she can do for the wheezing. Please advise. Albuterol inhaler was sent in to the pharmacy. 2 puffs every 4 hours as needed for wheezing if not improving or symptoms worsen she needs to follow up here or go to the emergency room. Pt called back and states the inhaler that Dr Angela Smith sent to the pharmacy is too expensive, $50. Wanted to know if she could either get a sample of a different inhaler or call in a cheaper inhaler to her pharmacy?   Please advise  Thanks    [facility]

## 2019-02-05 DIAGNOSIS — I15.0 RENOVASCULAR HYPERTENSION: ICD-10-CM

## 2019-02-06 RX ORDER — NEBIVOLOL HYDROCHLORIDE 10 MG/1
TABLET ORAL
Qty: 270 TABLET | Refills: 0 | Status: SHIPPED | OUTPATIENT
Start: 2019-02-06 | End: 2021-12-02

## 2019-03-20 ENCOUNTER — NURSE ONLY (OUTPATIENT)
Dept: FAMILY MEDICINE CLINIC | Age: 49
End: 2019-03-20
Payer: COMMERCIAL

## 2019-03-20 ENCOUNTER — TELEPHONE (OUTPATIENT)
Dept: FAMILY MEDICINE CLINIC | Age: 49
End: 2019-03-20

## 2019-03-20 DIAGNOSIS — M54.32 LEFT SIDED SCIATICA: Primary | ICD-10-CM

## 2019-03-20 PROCEDURE — 96372 THER/PROPH/DIAG INJ SC/IM: CPT | Performed by: FAMILY MEDICINE

## 2019-03-20 RX ORDER — TRIAMCINOLONE ACETONIDE 40 MG/ML
80 INJECTION, SUSPENSION INTRA-ARTICULAR; INTRAMUSCULAR ONCE
Status: COMPLETED | OUTPATIENT
Start: 2019-03-20 | End: 2019-03-20

## 2019-03-20 RX ADMIN — TRIAMCINOLONE ACETONIDE 80 MG: 40 INJECTION, SUSPENSION INTRA-ARTICULAR; INTRAMUSCULAR at 13:12

## 2019-03-21 ENCOUNTER — TELEPHONE (OUTPATIENT)
Dept: FAMILY MEDICINE CLINIC | Age: 49
End: 2019-03-21

## 2019-03-21 RX ORDER — METHYLPREDNISOLONE 4 MG/1
TABLET ORAL
Qty: 1 KIT | Refills: 0 | Status: SHIPPED | OUTPATIENT
Start: 2019-03-21 | End: 2020-10-22

## 2019-04-24 ENCOUNTER — TELEPHONE (OUTPATIENT)
Dept: FAMILY MEDICINE CLINIC | Age: 49
End: 2019-04-24

## 2020-10-22 ENCOUNTER — OFFICE VISIT (OUTPATIENT)
Dept: OBGYN CLINIC | Age: 50
End: 2020-10-22
Payer: COMMERCIAL

## 2020-10-22 VITALS
BODY MASS INDEX: 41.78 KG/M2 | SYSTOLIC BLOOD PRESSURE: 138 MMHG | HEIGHT: 66 IN | WEIGHT: 260 LBS | DIASTOLIC BLOOD PRESSURE: 88 MMHG

## 2020-10-22 PROCEDURE — 1036F TOBACCO NON-USER: CPT | Performed by: OBSTETRICS & GYNECOLOGY

## 2020-10-22 PROCEDURE — 99396 PREV VISIT EST AGE 40-64: CPT | Performed by: OBSTETRICS & GYNECOLOGY

## 2020-10-22 PROCEDURE — G8484 FLU IMMUNIZE NO ADMIN: HCPCS | Performed by: OBSTETRICS & GYNECOLOGY

## 2020-10-22 PROCEDURE — G8427 DOCREV CUR MEDS BY ELIG CLIN: HCPCS | Performed by: OBSTETRICS & GYNECOLOGY

## 2020-10-22 PROCEDURE — 99212 OFFICE O/P EST SF 10 MIN: CPT | Performed by: OBSTETRICS & GYNECOLOGY

## 2020-10-22 PROCEDURE — G8417 CALC BMI ABV UP PARAM F/U: HCPCS | Performed by: OBSTETRICS & GYNECOLOGY

## 2020-10-22 ASSESSMENT — ENCOUNTER SYMPTOMS
ABDOMINAL DISTENTION: 0
RECTAL PAIN: 0
ANAL BLEEDING: 0
CONSTIPATION: 0
EYES NEGATIVE: 1
RESPIRATORY NEGATIVE: 1
ALLERGIC/IMMUNOLOGIC NEGATIVE: 1
ABDOMINAL PAIN: 0
VOMITING: 0
NAUSEA: 0
BLOOD IN STOOL: 0
DIARRHEA: 0

## 2020-10-22 ASSESSMENT — VISUAL ACUITY: OU: 1

## 2020-10-22 NOTE — PROGRESS NOTES
Subjective:      Patient ID: Dionne Booth is a 53 y.o. female    Annual exam.   Pap performed and screening mammogram ordered. STD screening offered. Monthly SBE encouraged. F/U annual or prn. Pt also wished to discuss perimenopausal bleeding extending her appointment time by 10 minutes. Patient states she had a cycle last 2/2020 that was heavy and then no cycle until again last month that was heavy with clots. Flow x 7 days and resolved. Discussed perimenopausal bleeding patterns and encouraged to keep a bleeding calendar. Also discussed abnormal parameters of bleeding ( bleeding through overnight pads in 1-2 hours, prolonged cycles, daily bleeding,etc ). All questions answered. F/U as directed. Vitals:  /88   Ht 5' 6\" (1.676 m)   Wt 260 lb (117.9 kg)   LMP 10/15/2020   BMI 41.97 kg/m²   Past Medical History:   Diagnosis Date    Angiolipoma of right kidney 5/14/2018    Anxiety     CAD (coronary artery disease)     Hypertension     Somatic dysfunction of lumbar region      Past Surgical History:   Procedure Laterality Date    CARDIAC CATHETERIZATION      CARPAL TUNNEL RELEASE  01/03/14     DR FORD    HERNIA REPAIR       Allergies:  Aleve [naproxen sodium]; Codeine; Morphine and related; Adhesive tape;  Avelox [moxifloxacin hcl in nacl]; and Penicillins  Current Outpatient Medications   Medication Sig Dispense Refill    BYSTOLIC 10 MG tablet TAKE 3 TABLETS BY MOUTH DAILY 270 tablet 0    albuterol sulfate HFA (PROVENTIL HFA) 108 (90 Base) MCG/ACT inhaler Inhale 2 puffs into the lungs every 4 hours as needed for Wheezing (Patient taking differently: Inhale 2 puffs into the lungs every 4 hours as needed for Wheezing ) 1 Inhaler 3    nebivolol (BYSTOLIC) 10 MG tablet TAKE 3 TABLETS BY MOUTH DAILY 28 tablet 0    Azilsartan Medoxomil (EDARBI) 80 MG TABS 1 per day 30 tablet 0    Azilsartan Medoxomil 80 MG TABS Take 80 mg by mouth daily 90 tablet 1    allopurinol (ZYLOPRIM) 300 MG tablet Take 1 tablet by mouth daily 90 tablet 1     Current Facility-Administered Medications   Medication Dose Route Frequency Provider Last Rate Last Dose    methylPREDNISolone acetate (DEPO-MEDROL) injection 80 mg  80 mg Intramuscular Once [place] [employer], DO        triamcinolone acetonide (KENALOG-40) injection 80 mg  80 mg Intramuscular Once [place] [employer], DO         Social History     Socioeconomic History    Marital status:      Spouse name: Not on file    Number of children: Not on file    Years of education: Not on file    Highest education level: Not on file   Occupational History    Not on file   Social Needs    Financial resource strain: Not on file    Food insecurity     Worry: Not on file     Inability: Not on file    Transportation needs     Medical: Not on file     Non-medical: Not on file   Tobacco Use    Smoking status: Never Smoker    Smokeless tobacco: Never Used   Substance and Sexual Activity    Alcohol use: No    Drug use: No    Sexual activity: Yes     Partners: Male   Lifestyle    Physical activity     Days per week: Not on file     Minutes per session: Not on file    Stress: Not on file   Relationships    Social connections     Talks on phone: Not on file     Gets together: Not on file     Attends Yazidi service: Not on file     Active member of club or organization: Not on file     Attends meetings of clubs or organizations: Not on file     Relationship status: Not on file    Intimate partner violence     Fear of current or ex partner: Not on file     Emotionally abused: Not on file     Physically abused: Not on file     Forced sexual activity: Not on file   Other Topics Concern    Not on file   Social History Narrative    Not on file     Family History   Problem Relation Age of Onset    High Blood Pressure Mother     High Blood Pressure Father        Review of Systems   Constitutional: Negative.   Negative for activity change, appetite change, chills, diaphoresis, fatigue, fever and unexpected weight change. HENT: Negative. Eyes: Negative. Respiratory: Negative. Cardiovascular: Negative. Gastrointestinal: Negative for abdominal distention, abdominal pain, anal bleeding, blood in stool, constipation, diarrhea, nausea, rectal pain and vomiting. Endocrine: Negative. Genitourinary: Positive for menstrual problem (Perimenopausal cycles). Negative for decreased urine volume, difficulty urinating, dyspareunia, dysuria, enuresis, flank pain, frequency, genital sores, hematuria, pelvic pain, urgency, vaginal bleeding, vaginal discharge and vaginal pain. Musculoskeletal: Negative. Skin: Negative. Allergic/Immunologic: Negative. Neurological: Negative. Hematological: Negative. Psychiatric/Behavioral: Negative. Objective:     Physical Exam  Constitutional:       Appearance: She is well-developed. HENT:      Head: Normocephalic. Eyes:      General: Lids are normal. Vision grossly intact. Neck:      Musculoskeletal: Normal range of motion and neck supple. Thyroid: No thyromegaly. Cardiovascular:      Rate and Rhythm: Normal rate and regular rhythm. Heart sounds: Normal heart sounds. Pulmonary:      Effort: Pulmonary effort is normal. No respiratory distress. Breath sounds: Normal breath sounds. No wheezing or rales. Chest:      Chest wall: No tenderness. Breasts:         Right: Normal. No swelling, bleeding, inverted nipple, mass, nipple discharge, skin change or tenderness. Left: Normal. No swelling, bleeding, inverted nipple, mass, nipple discharge, skin change or tenderness. Abdominal:      General: There is no distension. Palpations: Abdomen is soft. There is no mass. Tenderness: There is no abdominal tenderness. There is no guarding or rebound. Hernia: No hernia is present. There is no hernia in the left inguinal area or right inguinal area.    Genitourinary: General: Normal vulva. Pubic Area: No rash. Labia:         Right: No rash, tenderness, lesion or injury. Left: No rash, tenderness, lesion or injury. Urethra: No prolapse, urethral swelling or urethral lesion. Vagina: Normal. No signs of injury and foreign body. No vaginal discharge, erythema, tenderness or bleeding. Cervix: No cervical motion tenderness, discharge or friability. Uterus: Not deviated, not enlarged, not fixed and not tender. Adnexa:         Right: No mass, tenderness or fullness. Left: No mass, tenderness or fullness. Rectum: Normal.   Musculoskeletal: Normal range of motion. General: No tenderness. Lymphadenopathy:      Cervical: No cervical adenopathy. Upper Body:      Right upper body: No supraclavicular or axillary adenopathy. Left upper body: No supraclavicular or axillary adenopathy. Lower Body: No right inguinal adenopathy. No left inguinal adenopathy. Skin:     General: Skin is warm and dry. Coloration: Skin is not pale. Findings: No erythema or rash. Neurological:      Mental Status: She is alert and oriented to person, place, and time. Psychiatric:         Behavior: Behavior normal.         Thought Content: Thought content normal.         Judgment: Judgment normal.         Assessment:      Diagnosis Orders   1. Women's annual routine gynecological examination  PAP SMEAR   2. Screening for human papillomavirus  PAP SMEAR   3. Screening mammogram, encounter for  BELLA DIGITAL SCREEN W OR WO CAD BILATERAL         Plan:      Medications placedthis encounter:  No orders of the defined types were placed in this encounter.         Orders placedthis encounter:  Orders Placed This Encounter   Procedures    BELLA DIGITAL SCREEN W OR WO CAD BILATERAL     Standing Status:   Future     Standing Expiration Date:   10/22/2021    PAP SMEAR     Standing Status:   Future     Standing Expiration Date: 10/22/2021     Order Specific Question:   Collection Type     Answer: Thin Prep     Order Specific Question:   Prior Abnormal Pap Test     Answer:   No     Order Specific Question:   Screening or Diagnostic     Answer:   Screening     Order Specific Question:   HPV Requested? Answer:   Yes     Order Specific Question:   High Risk Patient     Answer:   N/A         Follow up:  Return in about 1 year (around 10/22/2021) for Annual.   Repeat Annual GYN exam every 1 year. Cervical Cytology exam starts age 24. If Negative Cytology;  follow-up screening per current guidelines. Mammograms yearly starting at age 36. Calcium and Vitamin D dosing reviewed ( age appropriate ). Colonoscopy and bone density screening discussed ( age appropriate ). Birth control and STD prevention discussed ( age appropriate ). Gardisil counseling completed for all patients 7-35 yo. Routine health maintenance ( per PCP and guidelines ).

## 2020-10-22 NOTE — PROGRESS NOTES
The patient was asked if she would like a chaperone present for her intimate exam. She  Declined the chaperone.  Yaritza Avila

## 2020-11-04 ENCOUNTER — OFFICE VISIT (OUTPATIENT)
Dept: OBGYN CLINIC | Age: 50
End: 2020-11-04
Payer: COMMERCIAL

## 2020-11-04 VITALS — SYSTOLIC BLOOD PRESSURE: 152 MMHG | BODY MASS INDEX: 42.29 KG/M2 | DIASTOLIC BLOOD PRESSURE: 78 MMHG | WEIGHT: 262 LBS

## 2020-11-04 PROCEDURE — 1036F TOBACCO NON-USER: CPT | Performed by: OBSTETRICS & GYNECOLOGY

## 2020-11-04 PROCEDURE — 99213 OFFICE O/P EST LOW 20 MIN: CPT | Performed by: OBSTETRICS & GYNECOLOGY

## 2020-11-04 PROCEDURE — G8427 DOCREV CUR MEDS BY ELIG CLIN: HCPCS | Performed by: OBSTETRICS & GYNECOLOGY

## 2020-11-04 PROCEDURE — G8417 CALC BMI ABV UP PARAM F/U: HCPCS | Performed by: OBSTETRICS & GYNECOLOGY

## 2020-11-04 PROCEDURE — G8484 FLU IMMUNIZE NO ADMIN: HCPCS | Performed by: OBSTETRICS & GYNECOLOGY

## 2020-11-04 ASSESSMENT — ENCOUNTER SYMPTOMS
DIARRHEA: 0
BLOOD IN STOOL: 0
RESPIRATORY NEGATIVE: 1
ABDOMINAL PAIN: 0
ABDOMINAL DISTENTION: 0
VOMITING: 0
ANAL BLEEDING: 0
NAUSEA: 0
ALLERGIC/IMMUNOLOGIC NEGATIVE: 1
CONSTIPATION: 0
EYES NEGATIVE: 1
RECTAL PAIN: 0

## 2020-11-04 ASSESSMENT — PATIENT HEALTH QUESTIONNAIRE - PHQ9
1. LITTLE INTEREST OR PLEASURE IN DOING THINGS: 0
SUM OF ALL RESPONSES TO PHQ QUESTIONS 1-9: 0
SUM OF ALL RESPONSES TO PHQ9 QUESTIONS 1 & 2: 0
2. FEELING DOWN, DEPRESSED OR HOPELESS: 0
SUM OF ALL RESPONSES TO PHQ QUESTIONS 1-9: 0
SUM OF ALL RESPONSES TO PHQ QUESTIONS 1-9: 0

## 2020-11-04 NOTE — PROGRESS NOTES
Subjective:      Patient ID: Cecil Rivas is a 53 y. o.female    Pt here to discuss abnormal pap dx of + endometrial cells on pap age 53. Discussed that this finding is considered abnormal at age 53 and further evaluation with US and Endosee with bx will be scheduled to R/O pathology. States last cycle was very heavy with clots. All questions answered. F/U as directed. Pt was seen with total face to face time of 15 minutes with more than 50% of the visit being counseling and education regarding encounter dx of endometrial cells on pap. See discussion /counseling details as stated above. Vitals:  BP (!) 152/78   Wt 262 lb (118.8 kg)   LMP 10/15/2020   BMI 42.29 kg/m²   Past Medical History:   Diagnosis Date    Angiolipoma of right kidney 5/14/2018    Anxiety     CAD (coronary artery disease)     Hypertension     Somatic dysfunction of lumbar region      Past Surgical History:   Procedure Laterality Date    CARDIAC CATHETERIZATION      CARPAL TUNNEL RELEASE  01/03/14     DR FORD    HERNIA REPAIR       Allergies:  Aleve [naproxen sodium]; Codeine; Morphine and related; Adhesive tape;  Avelox [moxifloxacin hcl in nacl]; and Penicillins  Current Outpatient Medications   Medication Sig Dispense Refill    BYSTOLIC 10 MG tablet TAKE 3 TABLETS BY MOUTH DAILY 270 tablet 0    albuterol sulfate HFA (PROVENTIL HFA) 108 (90 Base) MCG/ACT inhaler Inhale 2 puffs into the lungs every 4 hours as needed for Wheezing (Patient taking differently: Inhale 2 puffs into the lungs every 4 hours as needed for Wheezing ) 1 Inhaler 3    nebivolol (BYSTOLIC) 10 MG tablet TAKE 3 TABLETS BY MOUTH DAILY 28 tablet 0    Azilsartan Medoxomil (EDARBI) 80 MG TABS 1 per day 30 tablet 0    Azilsartan Medoxomil 80 MG TABS Take 80 mg by mouth daily 90 tablet 1    allopurinol (ZYLOPRIM) 300 MG tablet Take 1 tablet by mouth daily 90 tablet 1     Current Facility-Administered Medications   Medication Dose Route Frequency Provider Last Rate Last Dose    methylPREDNISolone acetate (DEPO-MEDROL) injection 80 mg  80 mg Intramuscular Once [place] [employer], DO        triamcinolone acetonide (KENALOG-40) injection 80 mg  80 mg Intramuscular Once [place] [employer], DO         Social History     Socioeconomic History    Marital status:      Spouse name: Not on file    Number of children: Not on file    Years of education: Not on file    Highest education level: Not on file   Occupational History    Not on file   Social Needs    Financial resource strain: Not on file    Food insecurity     Worry: Not on file     Inability: Not on file    Transportation needs     Medical: Not on file     Non-medical: Not on file   Tobacco Use    Smoking status: Never Smoker    Smokeless tobacco: Never Used   Substance and Sexual Activity    Alcohol use: No    Drug use: No    Sexual activity: Yes     Partners: Male   Lifestyle    Physical activity     Days per week: Not on file     Minutes per session: Not on file    Stress: Not on file   Relationships    Social connections     Talks on phone: Not on file     Gets together: Not on file     Attends Mormonism service: Not on file     Active member of club or organization: Not on file     Attends meetings of clubs or organizations: Not on file     Relationship status: Not on file    Intimate partner violence     Fear of current or ex partner: Not on file     Emotionally abused: Not on file     Physically abused: Not on file     Forced sexual activity: Not on file   Other Topics Concern    Not on file   Social History Narrative    Not on file     Family History   Problem Relation Age of Onset    High Blood Pressure Mother     High Blood Pressure Father        Review of Systems   Constitutional: Negative. Negative for activity change, appetite change, chills, diaphoresis, fatigue, fever and unexpected weight change. HENT: Negative. Eyes: Negative. Respiratory: Negative. PELVIS COMPLETE     Standing Status:   Future     Standing Expiration Date:   11/4/2021    US NON OB TRANSVAGINAL     Standing Status:   Future     Standing Expiration Date:   11/4/2021         Follow up:  Return for Ultrasound, Results Review, Endosee w/ bx.

## 2020-11-09 ENCOUNTER — HOSPITAL ENCOUNTER (OUTPATIENT)
Dept: ULTRASOUND IMAGING | Age: 50
Discharge: HOME | End: 2020-11-11
Payer: COMMERCIAL

## 2020-11-09 PROCEDURE — 76856 US EXAM PELVIC COMPLETE: CPT

## 2020-11-09 PROCEDURE — 76830 TRANSVAGINAL US NON-OB: CPT

## 2020-11-11 ENCOUNTER — PROCEDURE VISIT (OUTPATIENT)
Dept: OBGYN CLINIC | Age: 50
End: 2020-11-11
Payer: COMMERCIAL

## 2020-11-11 VITALS
BODY MASS INDEX: 42.59 KG/M2 | DIASTOLIC BLOOD PRESSURE: 82 MMHG | SYSTOLIC BLOOD PRESSURE: 134 MMHG | WEIGHT: 265 LBS | HEIGHT: 66 IN

## 2020-11-11 LAB
HCG, URINE, POC: NEGATIVE
Lab: NORMAL
NEGATIVE QC PASS/FAIL: NORMAL
POSITIVE QC PASS/FAIL: NORMAL

## 2020-11-11 PROCEDURE — 99212 OFFICE O/P EST SF 10 MIN: CPT | Performed by: OBSTETRICS & GYNECOLOGY

## 2020-11-11 PROCEDURE — 58558 HYSTEROSCOPY BIOPSY: CPT | Performed by: OBSTETRICS & GYNECOLOGY

## 2020-11-11 PROCEDURE — 81025 URINE PREGNANCY TEST: CPT | Performed by: OBSTETRICS & GYNECOLOGY

## 2020-11-11 NOTE — PROGRESS NOTES
ENDOSEE PROCEDURE NOTE:    Pre-Procedure Dx: 54 yo female with irregular cycles and endometrial cells on pap       Post-Procedure Dx:  Same      Procedure:  Endosee procedure and Endometrial pipelle bx with ECC      Provider:  Charla Contreras M.D. Normal Saline:  25 cc      Findings:  Patient presents for Endosee procedure. Risks and benefits discussed. Consents signed. Motrin 800 mg po x 1 pre-procedure. SSE inserted for directed visualization of cervix. Cervix prepped with Betadine. Single tooth tenaculum placed on anterior lip of the cervix. Cervical os gently dilated with os finder. Endosee device inserted without difficulty to level of fundus. 25 cc of NS injected in routine fashion. Visualization of intra-uterine cavity and internal cervical os reveals large amount of fluffy, polypoid tissue. No IU lesions. NS gently aspirated from cavity and Endosee removed. Endometrial biopsy then performed with pipelle in routine fashion without difficulty. ECC performed. Tenaculum removed from cervix and tenaculum sites hemostatic. Patient appeared to tolerate the procedure well. Discharge instructions discussed. Complications:  None      Disposition:  Stable to home. Pt also here to discuss US results  extending her appointment time by 10 minutes. US as follows:    EXAMINATION: US NON OB TRANSVAGINAL, US PELVIS COMPLETE         CLINICAL HISTORY: 52-year-old premenopausal patient with endometrial cells on Pap smear.         COMPARISONS: None available.         FINDINGS: Transabdominal and transvaginal ultrasounds of the a pelvis and Doppler ovarian assessments were performed. On the transabdominal study the uterus is mildly enlarged measuring 13 x 7.1 x 5.5 cm. Ovaries are only visualized on the less sensitive     transabdominal study. Right ovary is normal in size measuring 2.8 x 2.3 x 1.3 cm with a volume of 4.3 mL. Color flow and spectral waveforms are documented within it.  Left ovary is mildly enlarged measuring 4.7 x 2.6 x 3.4 cm with  a volume of 22 mL. It    contains a 2.7 x 2.0 x 2.1 cm simple cyst and a second adjacent 2.9 x 1.9 x 1.7 cm simple cyst versus a septated cyst. Color flow, low resistive arterial venous waveforms are documented at the margin. No adnexal masses.         Transvaginal views are limited due to the size of the uterus. The endometrial echo complex is at the upper limits of normal for a menstruating female measuring 11 mm. It is relatively homogeneous in echotexture. No focal myometrial masses. There is a 9    to 10 mm myometrial cyst in the posterior lower uterine segment. Additionally there are multiple  cysts along the endocervical canal the largest measures 1.8 x 1.6 x 1.1 cm with some septations versus adjacent cysts. Additionally there are cysts in the    lower cervix. Visualized cervix otherwise unremarkable. On compromised views no large cystic adnexal masses or significant free fluid in the pelvis.              Impression    ENLARGED LEFT OVARY CONTAINING A 2 SIMPLE CYSTS VERSUS A SEPTATED CYST. FOLLOW-UP ULTRASOUND IS RECOMMENDED TO DOCUMENT STABILITY/RESOLUTION. ENDOMETRIAL ECHO COMPLEX AT THE UPPER LIMITS OF NORMAL IN THICKNESS FOR A MENSTRUATING FEMALE. CLINICAL CORRELATION RECOMMENDED. MULTIPLE NONSPECIFIC CERVICAL CYSTS AS DETAILED ABOVE              Pt also here to discuss US results extending her appointment time by 10 minutes. Discussed B/L small ovarian cysts; will repeat US 3 months to follow. Also discussed mildly thick endometrium and mildly enlarged uterus at 13 cm. All questions answered. F/U as directed.

## 2020-11-11 NOTE — PROGRESS NOTES
A timeout was performed immediately prior to the start of the ENDOSEE/EMBX procedure and included the correct patient (two identifiers), correct procedure and correct site(s). Procedure consent and allergies were also verified.

## 2020-11-30 ENCOUNTER — OFFICE VISIT (OUTPATIENT)
Dept: OBGYN CLINIC | Age: 50
End: 2020-11-30
Payer: COMMERCIAL

## 2020-11-30 VITALS
HEIGHT: 66 IN | BODY MASS INDEX: 42.43 KG/M2 | WEIGHT: 264 LBS | DIASTOLIC BLOOD PRESSURE: 88 MMHG | SYSTOLIC BLOOD PRESSURE: 136 MMHG

## 2020-11-30 PROCEDURE — G8417 CALC BMI ABV UP PARAM F/U: HCPCS | Performed by: OBSTETRICS & GYNECOLOGY

## 2020-11-30 PROCEDURE — 1036F TOBACCO NON-USER: CPT | Performed by: OBSTETRICS & GYNECOLOGY

## 2020-11-30 PROCEDURE — 99213 OFFICE O/P EST LOW 20 MIN: CPT | Performed by: OBSTETRICS & GYNECOLOGY

## 2020-11-30 PROCEDURE — G8427 DOCREV CUR MEDS BY ELIG CLIN: HCPCS | Performed by: OBSTETRICS & GYNECOLOGY

## 2020-11-30 PROCEDURE — G8484 FLU IMMUNIZE NO ADMIN: HCPCS | Performed by: OBSTETRICS & GYNECOLOGY

## 2020-11-30 ASSESSMENT — ENCOUNTER SYMPTOMS
DIARRHEA: 0
RESPIRATORY NEGATIVE: 1
BLOOD IN STOOL: 0
CONSTIPATION: 0
ABDOMINAL DISTENTION: 0
ANAL BLEEDING: 0
RECTAL PAIN: 0
EYES NEGATIVE: 1
ABDOMINAL PAIN: 0
VOMITING: 0
NAUSEA: 0
ALLERGIC/IMMUNOLOGIC NEGATIVE: 1

## 2020-11-30 NOTE — PROGRESS NOTES
Patient here for EMB results for + endometrial cells on pap and occasional heavy cycle. EMB negative for hyperplasia or malignancy. Continue bleeding calendar and normal perimenopausal bleeding patterns discussed. All questions answered. F/U as directed. Pt was seen with total face to face time of 15 minutes with more than 50% of the visit being counseling and education regarding encounter dx of endometrial cells on pap. See discussion /counseling details as stated above. Vitals:  /88   Ht 5' 6\" (1.676 m)   Wt 264 lb (119.7 kg)   BMI 42.61 kg/m²   Past Medical History:   Diagnosis Date    Angiolipoma of right kidney 5/14/2018    Anxiety     CAD (coronary artery disease)     Hypertension     Somatic dysfunction of lumbar region      Past Surgical History:   Procedure Laterality Date    CARDIAC CATHETERIZATION      CARPAL TUNNEL RELEASE  01/03/14     DR FORD    HERNIA REPAIR       Allergies:  Aleve [naproxen sodium]; Codeine; Morphine and related; Adhesive tape;  Avelox [moxifloxacin hcl in nacl]; and Penicillins  Current Outpatient Medications   Medication Sig Dispense Refill    BYSTOLIC 10 MG tablet TAKE 3 TABLETS BY MOUTH DAILY 270 tablet 0    albuterol sulfate HFA (PROVENTIL HFA) 108 (90 Base) MCG/ACT inhaler Inhale 2 puffs into the lungs every 4 hours as needed for Wheezing (Patient taking differently: Inhale 2 puffs into the lungs every 4 hours as needed for Wheezing ) 1 Inhaler 3    nebivolol (BYSTOLIC) 10 MG tablet TAKE 3 TABLETS BY MOUTH DAILY 28 tablet 0    Azilsartan Medoxomil (EDARBI) 80 MG TABS 1 per day 30 tablet 0    Azilsartan Medoxomil 80 MG TABS Take 80 mg by mouth daily 90 tablet 1    allopurinol (ZYLOPRIM) 300 MG tablet Take 1 tablet by mouth daily 90 tablet 1     Current Facility-Administered Medications   Medication Dose Route Frequency Provider Last Rate Last Dose    methylPREDNISolone acetate (DEPO-MEDROL) injection 80 mg  80 mg Intramuscular Once Juan Diego Jacobs stool, constipation, diarrhea, nausea, rectal pain and vomiting. Endocrine: Negative. Genitourinary: Negative for decreased urine volume, difficulty urinating, dyspareunia, dysuria, enuresis, flank pain, frequency, genital sores, hematuria, menstrual problem, pelvic pain, urgency, vaginal bleeding, vaginal discharge and vaginal pain. Musculoskeletal: Negative. Skin: Negative. Allergic/Immunologic: Negative. Neurological: Negative. Hematological: Negative. Psychiatric/Behavioral: Negative. Objective:     Physical Exam  Constitutional:       General: She is not in acute distress. Appearance: She is well-developed. She is not diaphoretic. HENT:      Head: Normocephalic and atraumatic. Eyes:      Conjunctiva/sclera: Conjunctivae normal.   Neck:      Musculoskeletal: Normal range of motion and neck supple. Cardiovascular:      Rate and Rhythm: Normal rate and regular rhythm. Pulmonary:      Effort: Pulmonary effort is normal. No respiratory distress. Musculoskeletal: Normal range of motion. General: No tenderness or deformity. Skin:     General: Skin is warm and dry. Coloration: Skin is not pale. Neurological:      Mental Status: She is alert and oriented to person, place, and time. Motor: No abnormal muscle tone. Coordination: Coordination normal.   Psychiatric:         Behavior: Behavior normal.         Thought Content: Thought content normal.         Judgment: Judgment normal.         Assessment:          Diagnosis Orders   1. Follow up     2. Endometrial cells on cervical Pap smear inconsistent w/LMP          Plan:      Medications placedthis encounter:  No orders of the defined types were placed in this encounter. Orders placedthis encounter:  No orders of the defined types were placed in this encounter.         Follow up:  Return for Annual.

## 2021-12-02 ENCOUNTER — OFFICE VISIT (OUTPATIENT)
Dept: OBGYN CLINIC | Age: 51
End: 2021-12-02
Payer: COMMERCIAL

## 2021-12-02 VITALS
DIASTOLIC BLOOD PRESSURE: 88 MMHG | HEIGHT: 66 IN | BODY MASS INDEX: 43.55 KG/M2 | WEIGHT: 271 LBS | SYSTOLIC BLOOD PRESSURE: 136 MMHG

## 2021-12-02 DIAGNOSIS — N92.0 MENORRHAGIA WITH REGULAR CYCLE: Primary | ICD-10-CM

## 2021-12-02 PROCEDURE — G8417 CALC BMI ABV UP PARAM F/U: HCPCS | Performed by: OBSTETRICS & GYNECOLOGY

## 2021-12-02 PROCEDURE — 99213 OFFICE O/P EST LOW 20 MIN: CPT | Performed by: OBSTETRICS & GYNECOLOGY

## 2021-12-02 PROCEDURE — 3017F COLORECTAL CA SCREEN DOC REV: CPT | Performed by: OBSTETRICS & GYNECOLOGY

## 2021-12-02 PROCEDURE — 1036F TOBACCO NON-USER: CPT | Performed by: OBSTETRICS & GYNECOLOGY

## 2021-12-02 PROCEDURE — G8427 DOCREV CUR MEDS BY ELIG CLIN: HCPCS | Performed by: OBSTETRICS & GYNECOLOGY

## 2021-12-02 PROCEDURE — G8484 FLU IMMUNIZE NO ADMIN: HCPCS | Performed by: OBSTETRICS & GYNECOLOGY

## 2021-12-02 ASSESSMENT — PATIENT HEALTH QUESTIONNAIRE - PHQ9
SUM OF ALL RESPONSES TO PHQ QUESTIONS 1-9: 0
SUM OF ALL RESPONSES TO PHQ9 QUESTIONS 1 & 2: 0
1. LITTLE INTEREST OR PLEASURE IN DOING THINGS: 0
2. FEELING DOWN, DEPRESSED OR HOPELESS: 0
SUM OF ALL RESPONSES TO PHQ QUESTIONS 1-9: 0
SUM OF ALL RESPONSES TO PHQ QUESTIONS 1-9: 0

## 2021-12-02 ASSESSMENT — ENCOUNTER SYMPTOMS
ANAL BLEEDING: 0
CONSTIPATION: 0
EYES NEGATIVE: 1
DIARRHEA: 0
ABDOMINAL DISTENTION: 0
ABDOMINAL PAIN: 0
BLOOD IN STOOL: 0
RECTAL PAIN: 0
VOMITING: 0
NAUSEA: 0
RESPIRATORY NEGATIVE: 1
ALLERGIC/IMMUNOLOGIC NEGATIVE: 1

## 2021-12-02 NOTE — PATIENT INSTRUCTIONS
EMBX---      You have been scheduled for an Endometrial Biopsy (EMBX). An EMBX is a procedure that involves using a soft, straw-like device to suction a small sample of lining from the uterus. An EMBX is done to find the cause of heavy, prolonged, or irregular uterine bleeding. It is also done to find the cause of uterine bleeding in women who have gone through menopause. Do I need anything prior to my EMBX? 1. No unprotected intercourse for 3-4 weeks prior to procedure  2. Take 800mg of Motrin 1 hour prior to your procedure  3. If you start your period you can still have the procedure if you are having light bleeding such as the first or last day of your cycle. What can I expect when I go home? 1. You may have pink or red tinged discharge after the procedure for up to 24 hours. 2.  This procedure may cause your period to start. 3.  If you have heavy bleeding (filling an overnight pad in 1 hour or less for 3 hrs), a fever of 100 degrees (or higher), or abdominal pain that is debilitating, please call the office immediately at [phone]. ENDOSEE - OFFICE HYSTEROSCOPY    Endosee Office Hysteroscopy is a diagnostic tool that lets your doctor see the inside of your uterus quickly and easily right in her office, without the need for general anesthesia in an operating room. Shira Thurston helps find the cause of several common issues women face such as abnormal bleeding and infertility concerns. Before using Endosee, your doctor will need to look at your health history, but most women are able to have the procedure without a problem. Do I need to do anything prior to my Endosee? 1. Ultrasound of pelvis complete and Ultrasound non-ob transvaginal to be done before the Endosee. Ultrasounds will be scheduled at check-out along with Endosee and follow-up office visit for results 2 weeks after Endosee. 2.  No unprotected intercourse for 3 weeks prior to procedure  3.   Take 800mg of Motrin 1 hour prior to your procedure  4. If you start your period, you can still have the Endosee done if your bleeding is very light. If your cycle is heavy please call to reschedule. 5.  Avoid vaginal medications or creams & douching for 24 hours before the procedure. 6.  Please come prepared to leave a urine sample prior to your procedure. What can I expect when I go home? 1. You may have some spotting or light discharge for up to 24 hours. 2.  You can resume all normal activities. 3.  The procedure may start your period. *If a biopsy is done, you will need to schedule a follow up appointment for 2 weeks following your procedure to get your results from the physician.

## 2021-12-02 NOTE — PROGRESS NOTES
Patient here c/o of heavy prolonged cycle x w weeks with large clots. Patient had + endometrial cells on pap last year ( 11/20 ) and had w/u at that time. EMB negative. Cycles at that time perimenopausal and only occasionally heavy. States last cycle 5 months ago. Patient denies new sexual partners or abnormal vaginal discharge. No recent blood thinners or steroid injections. Denies new or changed meds. Denies trauma. No major weight changes or increase in stress. Exam today with moderate clots in vault. Vaginal cx obtained. Ordered US and labs. Will return for results, EMB and annual exam.  Discussed Provera suppression with risks and benefits. States BP currently controlled. Will start Provera 10 mg per day and instructed to monitor BP bid at home. If BP increases; stop Provera. All questions answered.          Vitals:  /88   Ht 5' 6\" (1.676 m)   Wt 271 lb (122.9 kg)   LMP 11/18/2021   BMI 43.74 kg/m²   Past Medical History:   Diagnosis Date    Angiolipoma of right kidney 5/14/2018    Anxiety     CAD (coronary artery disease)     Hypertension     Somatic dysfunction of lumbar region      Past Surgical History:   Procedure Laterality Date    CARDIAC CATHETERIZATION      CARPAL TUNNEL RELEASE  01/03/14     DR FORD    HERNIA REPAIR       Allergies:  Aleve [naproxen sodium], Codeine, Morphine and related, Adhesive tape, Avelox [moxifloxacin hcl in nacl], and Penicillins  Current Outpatient Medications   Medication Sig Dispense Refill    Azilsartan Medoxomil (EDARBI) 80 MG TABS 1 per day 30 tablet 0    Azilsartan Medoxomil 80 MG TABS Take 80 mg by mouth daily 90 tablet 1    allopurinol (ZYLOPRIM) 300 MG tablet Take 1 tablet by mouth daily 90 tablet 1     Current Facility-Administered Medications   Medication Dose Route Frequency Provider Last Rate Last Admin    methylPREDNISolone acetate (DEPO-MEDROL) injection 80 mg  80 mg IntraMUSCular Once Inocencia Renteria, DO        triamcinolone acetonide (KENALOG-40) injection 80 mg  80 mg IntraMUSCular Once Collins Portillo DO         Social History     Socioeconomic History    Marital status:      Spouse name: Not on file    Number of children: Not on file    Years of education: Not on file    Highest education level: Not on file   Occupational History    Not on file   Tobacco Use    Smoking status: Never Smoker    Smokeless tobacco: Never Used   Substance and Sexual Activity    Alcohol use: No    Drug use: No    Sexual activity: Yes     Partners: Male   Other Topics Concern    Not on file   Social History Narrative    Not on file     Social Determinants of Health     Financial Resource Strain:     Difficulty of Paying Living Expenses: Not on file   Food Insecurity:     Worried About Running Out of Food in the Last Year: Not on file    Maya of Food in the Last Year: Not on file   Transportation Needs:     Lack of Transportation (Medical): Not on file    Lack of Transportation (Non-Medical):  Not on file   Physical Activity:     Days of Exercise per Week: Not on file    Minutes of Exercise per Session: Not on file   Stress:     Feeling of Stress : Not on file   Social Connections:     Frequency of Communication with Friends and Family: Not on file    Frequency of Social Gatherings with Friends and Family: Not on file    Attends [employer] Services: Not on file    Active Member of [address] [place] [address] [employer] or Organizations: Not on file    Attends Club or Organization Meetings: Not on file    Marital Status: Not on file   Intimate Partner Violence:     Fear of Current or Ex-Partner: Not on file    Emotionally Abused: Not on file    Physically Abused: Not on file    Sexually Abused: Not on file   Housing Stability:     Unable to Pay for Housing in the Last Year: Not on file    Number of [place] in the Last Year: Not on file    Unstable Housing in the Last Year: Not on file        Family History   Problem Relation Age of Onset    High Blood Pressure Mother     High Blood Pressure Father        Review of Systems   Constitutional: Negative. Negative for activity change, appetite change, chills, diaphoresis, fatigue, fever and unexpected weight change. HENT: Negative. Eyes: Negative. Respiratory: Negative. Cardiovascular: Negative. Gastrointestinal: Negative for abdominal distention, abdominal pain, anal bleeding, blood in stool, constipation, diarrhea, nausea, rectal pain and vomiting. Endocrine: Negative. Genitourinary: Positive for menstrual problem (Prolongeed heavy cycle with clots). Negative for decreased urine volume, difficulty urinating, dyspareunia, dysuria, enuresis, flank pain, frequency, genital sores, hematuria, pelvic pain, urgency, vaginal bleeding, vaginal discharge and vaginal pain. Musculoskeletal: Negative. Skin: Negative. Allergic/Immunologic: Negative. Neurological: Negative. Hematological: Negative. Psychiatric/Behavioral: Negative. Objective:     Physical Exam  Constitutional:       General: She is not in acute distress. Appearance: Normal appearance. She is well-developed. She is not diaphoretic. HENT:      Head: Normocephalic and atraumatic. Eyes:      Conjunctiva/sclera: Conjunctivae normal.   Cardiovascular:      Rate and Rhythm: Normal rate and regular rhythm. Pulmonary:      Effort: Pulmonary effort is normal. No respiratory distress. Abdominal:      General: There is no distension or abdominal bruit. Palpations: Abdomen is soft. Abdomen is not rigid. There is no shifting dullness, fluid wave, hepatomegaly, splenomegaly, mass or pulsatile mass. Tenderness: There is no abdominal tenderness. There is no guarding or rebound. Negative signs include Lynn's sign and McBurney's sign. Hernia: No hernia is present. There is no hernia in the umbilical area, ventral area, left inguinal area or right inguinal area.    Genitourinary: Labia:         Right: No rash, tenderness, lesion or injury. Left: No rash, tenderness, lesion or injury. Urethra: No prolapse, urethral pain, urethral swelling or urethral lesion. Vagina: Normal. No signs of injury and foreign body. No vaginal discharge, erythema, tenderness or bleeding. Cervix: No cervical motion tenderness, discharge, friability, lesion, erythema, cervical bleeding or eversion. Uterus: Normal. Not deviated, not enlarged, not fixed, not tender and no uterine prolapse. Adnexa: Right adnexa normal and left adnexa normal.        Right: No mass, tenderness or fullness. Left: No mass, tenderness or fullness. Rectum: Normal.      Comments: No cervical masses or CMT. No pelvic or adnexal masses; NT.    Musculoskeletal:         General: No tenderness or deformity. Normal range of motion. Cervical back: Normal range of motion and neck supple. Skin:     General: Skin is warm and dry. Coloration: Skin is not pale. Neurological:      Mental Status: She is alert and oriented to person, place, and time. Motor: No abnormal muscle tone. Coordination: Coordination normal.   Psychiatric:         Behavior: Behavior normal.         Thought Content: Thought content normal.         Judgment: Judgment normal.         Assessment:          Diagnosis Orders   1. Menorrhagia with regular cycle  CBC Auto Differential    Follicle Stimulating Hormone    HCG, Quantitative, Pregnancy    TSH with Reflex    US PELVIS COMPLETE    Miscellaneous sendout 3    Luteinizing Hormone        Plan:      Medications placedthis encounter:  No orders of the defined types were placed in this encounter. Orders placedthis encounter:  Orders Placed This Encounter   Procedures    US PELVIS COMPLETE     Standing Status:   Future     Standing Expiration Date:   12/2/2022     Scheduling Instructions:       With transvaginal    CBC Auto Differential     Standing Status: Future     Standing Expiration Date:   07/7/6187    Follicle Stimulating Hormone     Standing Status:   Future     Standing Expiration Date:   12/2/2022    HCG, Quantitative, Pregnancy     Standing Status:   Future     Standing Expiration Date:   12/2/2022    TSH with Reflex     Standing Status:   Future     Standing Expiration Date:   12/2/2022    Miscellaneous sendout 3     Standing Status:   Future     Standing Expiration Date:   12/2/2022     Order Specific Question:   Specify Req. Test (1 Test/Order)     Answer:   cultures    Luteinizing Hormone     Standing Status:   Future     Standing Expiration Date:   12/2/2022         Follow up:  Return for US ( Pelvic ), Results Review, Annual, Endometrial Biopsy.

## 2021-12-15 DIAGNOSIS — N92.0 MENORRHAGIA WITH REGULAR CYCLE: ICD-10-CM

## 2023-03-14 DIAGNOSIS — M1A.09X0 CHRONIC GOUT OF MULTIPLE SITES, UNSPECIFIED CAUSE: ICD-10-CM

## 2023-03-14 RX ORDER — ALLOPURINOL 300 MG/1
TABLET ORAL
Qty: 90 TABLET | Refills: 0 | Status: SHIPPED | OUTPATIENT
Start: 2023-03-14 | End: 2023-07-07

## 2023-03-27 ENCOUNTER — TELEPHONE (OUTPATIENT)
Dept: PRIMARY CARE | Facility: CLINIC | Age: 53
End: 2023-03-27
Payer: COMMERCIAL

## 2023-03-27 DIAGNOSIS — I15.9 SECONDARY HYPERTENSION: Primary | ICD-10-CM

## 2023-03-27 RX ORDER — TELMISARTAN 80 MG/1
80 TABLET ORAL DAILY
Qty: 90 TABLET | Refills: 1 | Status: SHIPPED | OUTPATIENT
Start: 2023-03-27 | End: 2023-09-05 | Stop reason: SDUPTHER

## 2023-03-27 RX ORDER — TERAZOSIN 5 MG/1
5 CAPSULE ORAL NIGHTLY
Qty: 90 CAPSULE | Refills: 1 | Status: SHIPPED | OUTPATIENT
Start: 2023-03-27 | End: 2023-04-03 | Stop reason: SDUPTHER

## 2023-03-27 RX ORDER — TERAZOSIN 5 MG/1
5 CAPSULE ORAL NIGHTLY
COMMUNITY
Start: 2021-06-12 | End: 2023-03-27 | Stop reason: SDUPTHER

## 2023-03-27 RX ORDER — TELMISARTAN 80 MG/1
80 TABLET ORAL DAILY
COMMUNITY
End: 2023-03-27 | Stop reason: SDUPTHER

## 2023-03-27 NOTE — TELEPHONE ENCOUNTER
PCP: [facility]    MEDICATION:     TERAZOSIN 5 MG - 1 CAPSULE DAILY    TELMISARTAN 80 MG - 1 TABLET DAILY    PHARMACY: [facility]    LAST OV: 9/16/22    NEXT OV: 3/29/23    REQUESTING A 90 DAY SUPPLY BECAUSE IT IS CHEAPER WITH HER INSURANCE - IS OUT OF THIS MEDICATIONS.

## 2023-03-28 PROBLEM — D64.9 ANEMIA: Status: ACTIVE | Noted: 2023-03-28

## 2023-03-28 PROBLEM — R21 RASH OF BACK: Status: ACTIVE | Noted: 2023-03-28

## 2023-03-28 PROBLEM — I10 HYPERTENSION: Status: ACTIVE | Noted: 2023-03-28

## 2023-03-28 PROBLEM — H92.01 RIGHT EAR PAIN: Status: ACTIVE | Noted: 2023-03-28

## 2023-03-28 PROBLEM — I51.7 LEFT VENTRICULAR HYPERTROPHY: Status: ACTIVE | Noted: 2023-03-28

## 2023-03-28 PROBLEM — J32.9 SINUSITIS: Status: ACTIVE | Noted: 2023-03-28

## 2023-03-28 PROBLEM — E66.01 CLASS 3 SEVERE OBESITY DUE TO EXCESS CALORIES WITH SERIOUS COMORBIDITY AND BODY MASS INDEX (BMI) OF 40.0 TO 44.9 IN ADULT (MULTI): Status: ACTIVE | Noted: 2023-03-28

## 2023-03-28 PROBLEM — M1A.0320: Status: ACTIVE | Noted: 2023-03-28

## 2023-03-28 PROBLEM — M17.11 RIGHT KNEE DJD: Status: ACTIVE | Noted: 2023-03-28

## 2023-03-28 PROBLEM — M10.9 GOUT: Status: ACTIVE | Noted: 2023-03-28

## 2023-03-28 PROBLEM — E66.813 CLASS 3 SEVERE OBESITY DUE TO EXCESS CALORIES WITH SERIOUS COMORBIDITY AND BODY MASS INDEX (BMI) OF 40.0 TO 44.9 IN ADULT: Status: ACTIVE | Noted: 2023-03-28

## 2023-03-28 PROBLEM — K21.9 GERD (GASTROESOPHAGEAL REFLUX DISEASE): Status: ACTIVE | Noted: 2023-03-28

## 2023-03-28 RX ORDER — VALACYCLOVIR HYDROCHLORIDE 1 G/1
1 TABLET, FILM COATED ORAL 3 TIMES DAILY
COMMUNITY
Start: 2022-05-09 | End: 2023-04-03 | Stop reason: WASHOUT

## 2023-03-28 RX ORDER — METOPROLOL SUCCINATE 100 MG/1
1 TABLET, EXTENDED RELEASE ORAL DAILY
COMMUNITY
Start: 2021-06-14 | End: 2023-09-11 | Stop reason: SDUPTHER

## 2023-03-28 RX ORDER — MELOXICAM 15 MG/1
1 TABLET ORAL DAILY
COMMUNITY
Start: 2022-10-05 | End: 2023-04-03

## 2023-03-28 RX ORDER — PANTOPRAZOLE SODIUM 40 MG/1
1 TABLET, DELAYED RELEASE ORAL DAILY
COMMUNITY
Start: 2019-06-27 | End: 2023-07-06 | Stop reason: SDUPTHER

## 2023-03-29 ENCOUNTER — APPOINTMENT (OUTPATIENT)
Dept: PRIMARY CARE | Facility: CLINIC | Age: 53
End: 2023-03-29
Payer: COMMERCIAL

## 2023-04-03 ENCOUNTER — OFFICE VISIT (OUTPATIENT)
Dept: PRIMARY CARE | Facility: CLINIC | Age: 53
End: 2023-04-03
Payer: COMMERCIAL

## 2023-04-03 VITALS
TEMPERATURE: 96.7 F | HEART RATE: 86 BPM | RESPIRATION RATE: 16 BRPM | OXYGEN SATURATION: 96 % | SYSTOLIC BLOOD PRESSURE: 158 MMHG | DIASTOLIC BLOOD PRESSURE: 90 MMHG | HEIGHT: 66 IN | BODY MASS INDEX: 45.8 KG/M2 | WEIGHT: 285 LBS

## 2023-04-03 DIAGNOSIS — Z00.00 ANNUAL PHYSICAL EXAM: Primary | ICD-10-CM

## 2023-04-03 DIAGNOSIS — E66.01 CLASS 3 SEVERE OBESITY DUE TO EXCESS CALORIES WITH SERIOUS COMORBIDITY AND BODY MASS INDEX (BMI) OF 45.0 TO 49.9 IN ADULT (MULTI): ICD-10-CM

## 2023-04-03 DIAGNOSIS — I15.9 SECONDARY HYPERTENSION: ICD-10-CM

## 2023-04-03 DIAGNOSIS — M17.11 PRIMARY OSTEOARTHRITIS OF RIGHT KNEE: ICD-10-CM

## 2023-04-03 DIAGNOSIS — R63.5 WEIGHT GAIN: ICD-10-CM

## 2023-04-03 PROBLEM — H92.01 RIGHT EAR PAIN: Status: RESOLVED | Noted: 2023-03-28 | Resolved: 2023-04-03

## 2023-04-03 PROBLEM — J32.9 SINUSITIS: Status: RESOLVED | Noted: 2023-03-28 | Resolved: 2023-04-03

## 2023-04-03 PROBLEM — E66.813 CLASS 3 SEVERE OBESITY DUE TO EXCESS CALORIES WITH SERIOUS COMORBIDITY AND BODY MASS INDEX (BMI) OF 40.0 TO 44.9 IN ADULT: Status: RESOLVED | Noted: 2023-03-28 | Resolved: 2023-04-03

## 2023-04-03 PROCEDURE — 3008F BODY MASS INDEX DOCD: CPT | Performed by: FAMILY MEDICINE

## 2023-04-03 PROCEDURE — 3077F SYST BP >= 140 MM HG: CPT | Performed by: FAMILY MEDICINE

## 2023-04-03 PROCEDURE — 1036F TOBACCO NON-USER: CPT | Performed by: FAMILY MEDICINE

## 2023-04-03 PROCEDURE — 3080F DIAST BP >= 90 MM HG: CPT | Performed by: FAMILY MEDICINE

## 2023-04-03 PROCEDURE — 99396 PREV VISIT EST AGE 40-64: CPT | Performed by: FAMILY MEDICINE

## 2023-04-03 RX ORDER — SEMAGLUTIDE 0.25 MG/.5ML
0.25 INJECTION, SOLUTION SUBCUTANEOUS
Qty: 2 ML | Refills: 2 | Status: SHIPPED | OUTPATIENT
Start: 2023-04-03 | End: 2023-12-11 | Stop reason: WASHOUT

## 2023-04-03 RX ORDER — TERAZOSIN 5 MG/1
5 CAPSULE ORAL 2 TIMES DAILY
Qty: 180 CAPSULE | Refills: 1 | Status: SHIPPED | OUTPATIENT
Start: 2023-04-03 | End: 2023-04-06 | Stop reason: SDUPTHER

## 2023-04-03 RX ORDER — CELECOXIB 200 MG/1
200 CAPSULE ORAL DAILY
Qty: 30 CAPSULE | Refills: 5 | Status: SHIPPED | OUTPATIENT
Start: 2023-04-03 | End: 2023-04-06 | Stop reason: SDUPTHER

## 2023-04-03 ASSESSMENT — COLUMBIA-SUICIDE SEVERITY RATING SCALE - C-SSRS
6. HAVE YOU EVER DONE ANYTHING, STARTED TO DO ANYTHING, OR PREPARED TO DO ANYTHING TO END YOUR LIFE?: NO
2. HAVE YOU ACTUALLY HAD ANY THOUGHTS OF KILLING YOURSELF?: NO
1. IN THE PAST MONTH, HAVE YOU WISHED YOU WERE DEAD OR WISHED YOU COULD GO TO SLEEP AND NOT WAKE UP?: NO

## 2023-04-03 ASSESSMENT — PATIENT HEALTH QUESTIONNAIRE - PHQ9
SUM OF ALL RESPONSES TO PHQ9 QUESTIONS 1 AND 2: 0
2. FEELING DOWN, DEPRESSED OR HOPELESS: NOT AT ALL
1. LITTLE INTEREST OR PLEASURE IN DOING THINGS: NOT AT ALL

## 2023-04-03 NOTE — PROGRESS NOTES
Subjective   Patient ID: Mariana Aguirre is a 52 y.o. female who presents for Hypertension and Annual Exam.    HPI    Annual physical   Eats a generally healthy diet   No Exercises having knee issues  Denies any chest pain,SOB  No Abdominal pain   No black or bloody stools   Urination/BM normal   Last eye apt 6 month  Last dental apt 6 month  Last Gyn apt 1 1/2 years ago  No new family h/o cancers or heart disease    HTN follow up  Denies chest pain,SOB  Denies any swelling, headaches, lightheadedness or dizziness  Does not check BP at home  Currently taking Metoprolol Succ, Telmisartan,     Knee pain  She has been having more pain.  Has previously seen Dr. Saunders.  She does nee a replacement, but it was recommended to wait a little bit longer.  Has had injections before.    Struggling with weight loss is very frustrating as she does need knee replacement this time blood pressure is high at all related to her weight issues she has had all her life    She is open to weight loss pills which at this point are contraindicated with her blood pressure but she is a good candidate for Wegovy and Saxenda    But weekly since she has had no shots before be a better way she tried multiple plans in the past and been not successful    No other concern.    Review of systems  ; Patient seen today for exam denies any problems with headaches or vision, denies any shortness of breath chest pain nausea or vomiting, no black stool no blood in the stool no heartburn type symptoms denies any problems with constipation or diarrhea, and no dysuria-type symptoms    The patient's allergies medications were reviewed with them today    The patient's social family and surgical history or also reviewed here today, along with her past medical history.     Objective     Alert and active in  no acute distress  HEENT TMs clear oropharynx negative nares clear no drainage noted neck supple  With no adenopathy   Heart regular rate and rhythm without  "murmur and no carotid bruits  Lungs- clear to auscultation bilaterally, no wheeze or rhonchi noted  Thyroid -negative masses or nodularity  Abdomen- soft times four quadrants , bowel sounds positive no masses or organomegaly, negative tenderness guarding or rebound  Neurological exam unremarkable- DTRs in upper and lower extremities within normal limits.   skin -no lesions noted    Bilateral knees with some crepitus right greater than left with some edema      /90 (BP Location: Left arm, Patient Position: Sitting)   Pulse 86   Temp 35.9 °C (96.7 °F) (Temporal)   Resp 16   Ht 1.676 m (5' 6\")   Wt 129 kg (285 lb)   SpO2 96%   BMI 46.00 kg/m²     Allergies   Allergen Reactions    Codeine Other     Gatrointestinal Upset    Hydrochlorothiazide Other     Edema, gout    Morphine Unknown    Penicillins Unknown       Assessment/Plan   Problem List Items Addressed This Visit          Circulatory    Hypertension    Relevant Medications    terazosin (Hytrin) 5 mg capsule       Musculoskeletal    Right knee DJD    Relevant Medications    celecoxib (CeleBREX) 200 mg capsule     Other Visit Diagnoses       Annual physical exam    -  Primary    BMI 45.0-49.9, adult (CMS/[facility])        Class 3 severe obesity due to excess calories with serious comorbidity and body mass index (BMI) of 45.0 to 49.9 in adult (CMS/HCC)        Weight gain        Relevant Medications    semaglutide, weight loss, (Wegovy) 0.25 mg/0.5 mL pen injector          Discussed patient's BMI and to institute calorie reduction and increase exercise to decrease risk of diabetes and heart disease in the future.    Discussed weight loss options with her today.  She will check with insurance to see if Wegovy is covered.  Demonstrated today.  Wegovy 0.25mg prescribed today.    She has failed weight loss medications in the past.  She has tried Zena Louis, Weight [employer].  Has tried exercising at gym.  Now has severe osteoarthritis of right knee.    Stop " Mobic.  Celebrex prescribed today.  She can take Tylenol as needed.    Increase Terazosin to 5mg BID.  Take Terazosin and Metoprolol at night.  Take Telmisartan in AM.    If anything worsens or changes please call us at once, follow up in the office as planned.    Scribe Attestation  By signing my name below, I, Madhavi Trevizo MA, Scribe   attest that this documentation has been prepared under the direction and in the presence of Anatoliy Good DO.

## 2023-04-06 ENCOUNTER — TELEPHONE (OUTPATIENT)
Dept: PRIMARY CARE | Facility: CLINIC | Age: 53
End: 2023-04-06
Payer: COMMERCIAL

## 2023-04-06 DIAGNOSIS — I15.9 SECONDARY HYPERTENSION: ICD-10-CM

## 2023-04-06 DIAGNOSIS — M17.11 PRIMARY OSTEOARTHRITIS OF RIGHT KNEE: ICD-10-CM

## 2023-04-06 DIAGNOSIS — R63.5 WEIGHT GAIN: ICD-10-CM

## 2023-04-06 RX ORDER — CELECOXIB 200 MG/1
200 CAPSULE ORAL DAILY
Qty: 90 CAPSULE | Refills: 1 | Status: SHIPPED | OUTPATIENT
Start: 2023-04-06 | End: 2023-10-03

## 2023-04-06 RX ORDER — TERAZOSIN 5 MG/1
5 CAPSULE ORAL 2 TIMES DAILY
Qty: 180 CAPSULE | Refills: 1 | Status: SHIPPED | OUTPATIENT
Start: 2023-04-06 | End: 2023-06-20 | Stop reason: SDUPTHER

## 2023-04-06 RX ORDER — SEMAGLUTIDE 0.25 MG/.5ML
0.25 INJECTION, SOLUTION SUBCUTANEOUS
Qty: 6 ML | Refills: 1 | Status: CANCELLED | OUTPATIENT
Start: 2023-04-06

## 2023-04-06 NOTE — TELEPHONE ENCOUNTER
PATIENT IS CALLING - SAW DR. SWANN ON 4/3/23, REFILLED CELEBREX,SEMAGLUTIDE & TERAZOSIN, ALL MEDICATIONS WERE CALLED IN FOR JUST A 30 DAY SUPPLY WITH 5 REFILLS - PATIENT WANTS TO KNOW WHY?  SHE NEEDS 90 DAY SUPPLIES.

## 2023-04-13 ENCOUNTER — TELEPHONE (OUTPATIENT)
Dept: PRIMARY CARE | Facility: CLINIC | Age: 53
End: 2023-04-13

## 2023-04-13 NOTE — TELEPHONE ENCOUNTER
PATIENT CALLED, WANTS TO KNOW IF DR STOREY CAN GIVE  STRENGTH OF THE CELEBREX?  SHE IS STILL IN PAIN.  SHE SEES THE SURGEON  NEXT WEEK.  [facility] IN [place]  PLEASE ADVISE

## 2023-04-19 ENCOUNTER — TELEPHONE (OUTPATIENT)
Dept: PRIMARY CARE | Facility: CLINIC | Age: 53
End: 2023-04-19
Payer: COMMERCIAL

## 2023-04-19 DIAGNOSIS — M17.0 BILATERAL PRIMARY OSTEOARTHRITIS OF KNEE: Primary | ICD-10-CM

## 2023-04-19 RX ORDER — IBUPROFEN 800 MG/1
800 TABLET ORAL 3 TIMES DAILY PRN
Qty: 90 TABLET | Refills: 1 | Status: SHIPPED | OUTPATIENT
Start: 2023-04-19 | End: 2023-12-11 | Stop reason: SDUPTHER

## 2023-04-19 NOTE — TELEPHONE ENCOUNTER
PATIENT CALLING, SHE STATES THAT THE INCREASED DOSAGE OF THE CELEBREX IS NOT HELPING FOR HER KNEE PAIN. SHE STATES THAT YOU MENTIONED IF THAT DID NOT WORK THAT YOU WOULD SEND IN A SCRIPT  MG OF IBUPROFEN. PLEASE ADVISE.

## 2023-06-01 ENCOUNTER — APPOINTMENT (OUTPATIENT)
Dept: PRIMARY CARE | Facility: CLINIC | Age: 53
End: 2023-06-01
Payer: COMMERCIAL

## 2023-06-02 ENCOUNTER — OFFICE VISIT (OUTPATIENT)
Dept: PRIMARY CARE | Facility: CLINIC | Age: 53
End: 2023-06-02
Payer: COMMERCIAL

## 2023-06-02 ENCOUNTER — APPOINTMENT (OUTPATIENT)
Dept: PRIMARY CARE | Facility: CLINIC | Age: 53
End: 2023-06-02
Payer: COMMERCIAL

## 2023-06-02 VITALS
DIASTOLIC BLOOD PRESSURE: 76 MMHG | HEART RATE: 75 BPM | WEIGHT: 286.6 LBS | TEMPERATURE: 97.8 F | BODY MASS INDEX: 46.06 KG/M2 | OXYGEN SATURATION: 98 % | HEIGHT: 66 IN | SYSTOLIC BLOOD PRESSURE: 132 MMHG

## 2023-06-02 DIAGNOSIS — J21.9 ACUTE BRONCHIOLITIS DUE TO UNSPECIFIED ORGANISM: ICD-10-CM

## 2023-06-02 DIAGNOSIS — E66.01 CLASS 3 SEVERE OBESITY DUE TO EXCESS CALORIES WITH SERIOUS COMORBIDITY AND BODY MASS INDEX (BMI) OF 45.0 TO 49.9 IN ADULT (MULTI): ICD-10-CM

## 2023-06-02 DIAGNOSIS — J30.2 SEASONAL ALLERGIES: Primary | ICD-10-CM

## 2023-06-02 PROCEDURE — 1036F TOBACCO NON-USER: CPT | Performed by: FAMILY MEDICINE

## 2023-06-02 PROCEDURE — 99213 OFFICE O/P EST LOW 20 MIN: CPT | Performed by: FAMILY MEDICINE

## 2023-06-02 PROCEDURE — 3075F SYST BP GE 130 - 139MM HG: CPT | Performed by: FAMILY MEDICINE

## 2023-06-02 PROCEDURE — 3078F DIAST BP <80 MM HG: CPT | Performed by: FAMILY MEDICINE

## 2023-06-02 PROCEDURE — 3008F BODY MASS INDEX DOCD: CPT | Performed by: FAMILY MEDICINE

## 2023-06-02 RX ORDER — METHYLPREDNISOLONE 4 MG/1
TABLET ORAL
Qty: 21 TABLET | Refills: 0 | Status: SHIPPED | OUTPATIENT
Start: 2023-06-02 | End: 2023-06-09

## 2023-06-02 RX ORDER — TRIAMCINOLONE ACETONIDE 40 MG/ML
80 INJECTION, SUSPENSION INTRA-ARTICULAR; INTRAMUSCULAR ONCE
Status: CANCELLED | OUTPATIENT
Start: 2023-06-02 | End: 2023-06-02

## 2023-06-02 RX ORDER — AZITHROMYCIN 250 MG/1
TABLET, FILM COATED ORAL
Qty: 6 TABLET | Refills: 0 | Status: SHIPPED | OUTPATIENT
Start: 2023-06-02 | End: 2023-06-07

## 2023-06-02 RX ORDER — PROMETHAZINE HYDROCHLORIDE AND DEXTROMETHORPHAN HYDROBROMIDE 6.25; 15 MG/5ML; MG/5ML
5-10 SYRUP ORAL EVERY 4 HOURS PRN
Qty: 240 ML | Refills: 0 | Status: SHIPPED | OUTPATIENT
Start: 2023-06-02 | End: 2023-07-02

## 2023-06-02 NOTE — PROGRESS NOTES
"Subjective   Patient ID: Mariana Aguirre is a 52 y.o. female who presents for Allergies.    HPI    Seasonal allergies   X 2 weeks   Admits to right ear fullness  Increased coughing  Worsens at night   No itchy watery eyes, sneezing or sinus congestion  Has taken Tussin and otc allergy relief.  She is not blowing out any nasal drainage.  No one else is ill at home.  Has not suffered from allergies before.      Review of systems  ; Patient seen today for exam denies any problems with headaches or vision, denies any shortness of breath chest pain nausea or vomiting, no black stool no blood in the stool no heartburn type symptoms denies any problems with constipation or diarrhea, and no dysuria-type symptoms    The patient's allergies medications were reviewed with them today    The patient's social family and surgical history or also reviewed here today, along with her past medical history.     Objective     Alert and active in  no acute distress  HEENT TMs clear oropharynx negative nares clear no drainage noted neck supple  With no adenopathy   Heart regular rate and rhythm without murmur and no carotid bruits  Lungs- clear to auscultation bilaterally, slight expiratory wheeze thyroid -negative masses or nodularity  Abdomen- soft times four quadrants , bowel sounds positive no masses or organomegaly, negative tenderness guarding or rebound  Neurological exam unremarkable- DTRs in upper and lower extremities within normal limits.   skin -no lesions noted      /76   Pulse 75   Temp 36.6 °C (97.8 °F) (Temporal)   Ht 1.676 m (5' 6\")   Wt 130 kg (286 lb 9.6 oz)   SpO2 98%   BMI 46.26 kg/m²     Allergies   Allergen Reactions    Codeine Other     Gatrointestinal Upset    Hydrochlorothiazide Other     Edema, gout    Morphine Unknown    Penicillins Unknown       Assessment/Plan   Problem List Items Addressed This Visit    None  Visit Diagnoses       Seasonal allergies    -  Primary    Class 3 severe obesity due to " excess calories with serious comorbidity and body mass index (BMI) of 45.0 to 49.9 in adult (CMS/[facility])        BMI 45.0-49.9, adult (CMS/[facility])        Acute bronchiolitis due to unspecified organism        Relevant Medications    methylPREDNISolone (Medrol Dospak) 4 mg tablets    azithromycin (Zithromax) 250 mg tablet    promethazine-DM (Phenergan-DM) 6.25-15 mg/5 mL syrup          She can take regular allergy medications.  She should not take Sudafed, as that can raise her BP.    Cough Syrup, Medrol dosepak, and Z-lizet prescribed today.    If anything worsens or changes please call us at once, follow up in the office as planned.    Scribe Attestation  By signing my name below, I, Madhavi Trevizo MA, Scribe   attest that this documentation has been prepared under the direction and in the presence of Anatoliy Good DO.

## 2023-06-20 DIAGNOSIS — M17.11 PRIMARY OSTEOARTHRITIS OF RIGHT KNEE: ICD-10-CM

## 2023-06-20 DIAGNOSIS — I15.9 SECONDARY HYPERTENSION: ICD-10-CM

## 2023-06-20 RX ORDER — MELOXICAM 15 MG/1
15 TABLET ORAL DAILY
COMMUNITY
End: 2023-06-20 | Stop reason: SDUPTHER

## 2023-06-20 NOTE — TELEPHONE ENCOUNTER
Pt is calling to request 2 rx refills. She is aware you are out of the office today    Rx Refill Request Telephone Encounter    Name:  Mariana Aguirre  : 1970     Medication Name:  Meloxicam (states there is a 30 day supply but she needed a 90 day supply)  Dose (Optional):    15mg  Quantity (Optional):    #90  Directions (Optional):   Take 1 tablet (15 mg) by mouth once daily    ALLERGIES:   see list    Specific Pharmacy location:  [facility]    Date of last appointment:  23  Date of next appointment:  none    Best number to reach patient:  [phone]    Rx Refill Request Telephone Encounter    Name:  Mariana Aguirre  : 1970     Medication Name:  Terazosin (states the insurance is not covering it to the way it was changed, 2 daily, wanted to go back to the once daily. She is leaving Friday for out of town and doesn't think it will get approved before Friday for the 2 per day)  Dose (Optional):    5mg  Quantity (Optional):    #90  Directions (Optional):   Take 1 capsule (5mg) once daily    ALLERGIES:   see list    Specific Pharmacy location:  [facility]    Date of last appointment:  23  Date of next appointment:  none    Best number to reach patient:  [phone]

## 2023-06-21 RX ORDER — TERAZOSIN 5 MG/1
CAPSULE ORAL
Qty: 90 CAPSULE | Refills: 1 | Status: SHIPPED | OUTPATIENT
Start: 2023-06-21 | End: 2023-12-01

## 2023-06-21 RX ORDER — MELOXICAM 15 MG/1
15 TABLET ORAL DAILY
Qty: 90 TABLET | Refills: 1 | Status: SHIPPED | OUTPATIENT
Start: 2023-06-21 | End: 2023-09-11 | Stop reason: SDUPTHER

## 2023-07-06 DIAGNOSIS — K21.9 GASTROESOPHAGEAL REFLUX DISEASE, UNSPECIFIED WHETHER ESOPHAGITIS PRESENT: ICD-10-CM

## 2023-07-06 RX ORDER — PANTOPRAZOLE SODIUM 40 MG/1
40 TABLET, DELAYED RELEASE ORAL DAILY
Qty: 90 TABLET | Refills: 1 | Status: SHIPPED | OUTPATIENT
Start: 2023-07-06 | End: 2023-09-11 | Stop reason: SDUPTHER

## 2023-07-06 NOTE — TELEPHONE ENCOUNTER
Rx Refill Request Telephone Encounter    Name:  Mariana Aguirre  : 1970     Medication Name:  Pantoprazole  Dose (Optional):    40mg  Quantity (Optional):    #90  Directions (Optional):   Take 1 tablet (40mg) by mouth once daily    ALLERGIES:   see list     Specific Pharmacy location:  [facility]    Date of last appointment:  23  Date of next appointment:  none    Best number to reach patient:  [phone]

## 2023-07-07 DIAGNOSIS — M1A.09X0 CHRONIC GOUT OF MULTIPLE SITES, UNSPECIFIED CAUSE: ICD-10-CM

## 2023-07-07 RX ORDER — ALLOPURINOL 300 MG/1
TABLET ORAL
Qty: 90 TABLET | Refills: 0 | Status: SHIPPED | OUTPATIENT
Start: 2023-07-07 | End: 2023-09-11 | Stop reason: SDUPTHER

## 2023-08-28 ENCOUNTER — TELEPHONE (OUTPATIENT)
Dept: PRIMARY CARE | Facility: CLINIC | Age: 53
End: 2023-08-28
Payer: COMMERCIAL

## 2023-08-28 NOTE — TELEPHONE ENCOUNTER
Dr Bowser put her on Diclofenac.   She wants to know if you feel it is safe for her to take with her blood pressure issues.       Please advise       Please route to pool

## 2023-08-30 NOTE — TELEPHONE ENCOUNTER
Pt aware. She doesn't want to try that because she doesn't want any more issues with her blood pressure.  She wanted to know if you could maybe prescribe a different arthritis med because the Meloxiam or the Celebrex both are not working for her  ALSO  She wanted to know, since her insurance won't cover the Wegovy for weight loss she wanted to know if you could prescribe Adipex? She states she spoke to some people and it's usually cheaper with her insurance and covered    PLEASE ADDRESS BOTH MESSAGES    Thanks    Pt's # [phone]    Preferred pharmacy [facility]

## 2023-08-31 NOTE — TELEPHONE ENCOUNTER
Patient aware. She thought she was supposed to be taking Meloxicam every day? Is that correct? Please clarify  Thanks

## 2023-09-05 DIAGNOSIS — I15.9 SECONDARY HYPERTENSION: ICD-10-CM

## 2023-09-05 RX ORDER — TELMISARTAN 80 MG/1
80 TABLET ORAL DAILY
Qty: 90 TABLET | Refills: 0 | Status: SHIPPED | OUTPATIENT
Start: 2023-09-05 | End: 2023-12-06

## 2023-09-11 DIAGNOSIS — K21.9 GASTROESOPHAGEAL REFLUX DISEASE, UNSPECIFIED WHETHER ESOPHAGITIS PRESENT: ICD-10-CM

## 2023-09-11 DIAGNOSIS — M1A.09X0 CHRONIC GOUT OF MULTIPLE SITES, UNSPECIFIED CAUSE: ICD-10-CM

## 2023-09-11 DIAGNOSIS — M17.11 PRIMARY OSTEOARTHRITIS OF RIGHT KNEE: ICD-10-CM

## 2023-09-11 DIAGNOSIS — I10 HYPERTENSION, UNSPECIFIED TYPE: ICD-10-CM

## 2023-09-11 RX ORDER — PANTOPRAZOLE SODIUM 40 MG/1
40 TABLET, DELAYED RELEASE ORAL DAILY
Qty: 90 TABLET | Refills: 1 | Status: SHIPPED | OUTPATIENT
Start: 2023-09-11 | End: 2023-12-11 | Stop reason: SDUPTHER

## 2023-09-11 RX ORDER — ALLOPURINOL 300 MG/1
300 TABLET ORAL DAILY
Qty: 90 TABLET | Refills: 1 | Status: SHIPPED | OUTPATIENT
Start: 2023-09-11 | End: 2023-12-11 | Stop reason: SDUPTHER

## 2023-09-11 RX ORDER — METOPROLOL SUCCINATE 100 MG/1
100 TABLET, EXTENDED RELEASE ORAL DAILY
Qty: 90 TABLET | Refills: 1 | Status: SHIPPED | OUTPATIENT
Start: 2023-09-11 | End: 2023-12-11 | Stop reason: SDUPTHER

## 2023-09-11 RX ORDER — MELOXICAM 15 MG/1
15 TABLET ORAL DAILY
Qty: 90 TABLET | Refills: 1 | Status: SHIPPED | OUTPATIENT
Start: 2023-09-11 | End: 2023-12-11 | Stop reason: ALTCHOICE

## 2023-09-11 NOTE — TELEPHONE ENCOUNTER
Rx Refill Request Telephone Encounter    Name:  Mariana Aguirre  : 1970     Medication Name:    Dose (Optional):    Allopurinol 300 MG  Quantity (Optional):    90 REFILL: 2  Directions (Optional):   TAKE 1 TABLET BY MOUTH EVERY DAY     Medication Name:  Meloxicam 15 MG  Quantity (Optional):    90 REFILL: 2  Directions (Optional):   Take 1 tablet (15 mg) by mouth once daily.     Medication Name:  PANTOPRAZOLE 40 MG  Quantity (Optional):    90 REFILL: 2  Directions (Optional):   Take 1 tablet (40 mg) by mouth once daily.     Medication Name:  METROPROLOL  MG  Quantity (Optional):    90 REFILL: 2  Directions (Optional):   TAKE 1 TABLET BY MOUTH EVERY DAY    [facility]    Date of last appointment:  23

## 2023-10-13 ENCOUNTER — OFFICE VISIT (OUTPATIENT)
Dept: ORTHOPEDIC SURGERY | Facility: CLINIC | Age: 53
End: 2023-10-13
Payer: COMMERCIAL

## 2023-10-13 DIAGNOSIS — M17.11 PRIMARY OSTEOARTHRITIS OF RIGHT KNEE: Primary | ICD-10-CM

## 2023-10-13 PROCEDURE — 3008F BODY MASS INDEX DOCD: CPT | Performed by: ORTHOPAEDIC SURGERY

## 2023-10-13 PROCEDURE — 99214 OFFICE O/P EST MOD 30 MIN: CPT | Performed by: ORTHOPAEDIC SURGERY

## 2023-10-13 PROCEDURE — 1036F TOBACCO NON-USER: CPT | Performed by: ORTHOPAEDIC SURGERY

## 2023-10-13 NOTE — PROGRESS NOTES
History: Mariana is here for her knee.  She has known arthritis.  We did a cortisone shot which is lasted about 2-1/2 months.  Gel did not give her much improvement.    Past medical history: Multiple  Medications: Multiple  Allergies: No known drug allergies    Please refer to the intake H&P regarding the patient's review of systems, family history and social history as was done today    HEENT: Normal  Lungs: Clear to auscultation  Heart: RRR  Abdomen: Soft, nontender  Skin: clear  Extremity: She has tenderness again more medially.  Plus crepitus with range of motion.  Negative Lachman and posterior drawer.  Mild laxity with varus stress.  No numbness.  Contralateral exam is normal for strength, motion, stability and neurovascular assessment.    Radiographs: Prior x-rays showed moderate degenerative change.    Assessment: Right knee DJD    Plan: She is too early for another cortisone shot.  We discussed getting her onto some Relafen as the other anti-inflammatories have not helped.  She needs to continue to work on a weight loss regimen she is not a candidate for surgery at this time.  We will see her back in another month or cortisone trial.  All questions were answered today with the patient.    This note was generated with voice recognition software and may contain grammatical errors.

## 2023-10-27 ENCOUNTER — OFFICE VISIT (OUTPATIENT)
Dept: PRIMARY CARE | Facility: CLINIC | Age: 53
End: 2023-10-27
Payer: COMMERCIAL

## 2023-10-27 VITALS
OXYGEN SATURATION: 95 % | BODY MASS INDEX: 47.09 KG/M2 | SYSTOLIC BLOOD PRESSURE: 136 MMHG | TEMPERATURE: 97 F | WEIGHT: 293 LBS | DIASTOLIC BLOOD PRESSURE: 86 MMHG | HEIGHT: 66 IN | HEART RATE: 92 BPM | RESPIRATION RATE: 16 BRPM

## 2023-10-27 DIAGNOSIS — E66.01 CLASS 3 SEVERE OBESITY DUE TO EXCESS CALORIES WITH SERIOUS COMORBIDITY AND BODY MASS INDEX (BMI) OF 45.0 TO 49.9 IN ADULT (MULTI): ICD-10-CM

## 2023-10-27 DIAGNOSIS — R05.1 ACUTE COUGH: ICD-10-CM

## 2023-10-27 DIAGNOSIS — R09.81 NASAL CONGESTION: ICD-10-CM

## 2023-10-27 DIAGNOSIS — J01.90 ACUTE SINUSITIS, RECURRENCE NOT SPECIFIED, UNSPECIFIED LOCATION: ICD-10-CM

## 2023-10-27 PROCEDURE — 3008F BODY MASS INDEX DOCD: CPT | Performed by: FAMILY MEDICINE

## 2023-10-27 PROCEDURE — 1036F TOBACCO NON-USER: CPT | Performed by: FAMILY MEDICINE

## 2023-10-27 PROCEDURE — 3079F DIAST BP 80-89 MM HG: CPT | Performed by: FAMILY MEDICINE

## 2023-10-27 PROCEDURE — 3075F SYST BP GE 130 - 139MM HG: CPT | Performed by: FAMILY MEDICINE

## 2023-10-27 PROCEDURE — 99213 OFFICE O/P EST LOW 20 MIN: CPT | Performed by: FAMILY MEDICINE

## 2023-10-27 RX ORDER — BENZONATATE 100 MG/1
100 CAPSULE ORAL 3 TIMES DAILY PRN
Qty: 42 CAPSULE | Refills: 0 | Status: SHIPPED | OUTPATIENT
Start: 2023-10-27 | End: 2023-11-26

## 2023-10-27 RX ORDER — LEVOFLOXACIN 500 MG/1
500 TABLET, FILM COATED ORAL DAILY
Qty: 10 TABLET | Refills: 0 | Status: SHIPPED | OUTPATIENT
Start: 2023-10-27 | End: 2023-11-06

## 2023-10-27 RX ORDER — PROMETHAZINE HYDROCHLORIDE AND DEXTROMETHORPHAN HYDROBROMIDE 6.25; 15 MG/5ML; MG/5ML
5-10 SYRUP ORAL EVERY 4 HOURS PRN
Qty: 240 ML | Refills: 0 | Status: SHIPPED | OUTPATIENT
Start: 2023-10-27 | End: 2023-11-26

## 2023-10-27 NOTE — PROGRESS NOTES
"Subjective   Patient ID: Mariana Aguirre is a 53 y.o. female who presents for Cough and Nasal Congestion.    HPI    Patient presents today for congestion.  Ongoing x 2 week  Other symptoms include ear pressure, cough, drainage   Denies fever or chills  Has tried OTC Day and Night Quill  She did not test for COVID.  She states she isn't sleeping.      No other concern  Review of systems  ; Patient seen today for exam denies any problems with headaches or vision, denies any shortness of breath chest pain nausea or vomiting, no black stool no blood in the stool no heartburn type symptoms denies any problems with constipation or diarrhea, and no dysuria-type symptoms    The patient's allergies medications were reviewed with them today    The patient's social family and surgical history or also reviewed here today, along with her past medical history.     Objective     Alert and active in  no acute distress  HEENT TMs clear oropharynx negative nares pos drainage noted neck supple  With no adenopathy   Heart regular rate and rhythm without murmur and no carotid bruits  Lungs- clear to auscultation bilaterally, no wheeze or rhonchi noted  Thyroid -negative masses or nodularity  Abdomen- soft times four quadrants , bowel sounds positive no masses or organomegaly, negative tenderness guarding or rebound  Neurological exam unremarkable- DTRs in upper and lower extremities within normal limits.   skin -no lesions noted      BP (!) 162/94 (BP Location: Left arm, Patient Position: Sitting, BP Cuff Size: Large adult)   Pulse 92   Temp 36.1 °C (97 °F) (Temporal)   Resp 16   Ht 1.676 m (5' 6\")   Wt 133 kg (293 lb)   SpO2 95%   BMI 47.29 kg/m²     Allergies   Allergen Reactions    Codeine Other     Gatrointestinal Upset    Hydrochlorothiazide Other     Edema, gout    Morphine Unknown       Assessment/Plan   Problem List Items Addressed This Visit    None  Visit Diagnoses       Class 3 severe obesity due to excess calories " with serious comorbidity and body mass index (BMI) of 45.0 to 49.9 in adult (CMS/[facility])        Nasal congestion        Acute cough        Relevant Medications    promethazine-DM (Phenergan-DM) 6.25-15 mg/5 mL syrup    benzonatate (Tessalon) 100 mg capsule    BMI 45.0-49.9, adult (CMS/[facility])        Acute sinusitis, recurrence not specified, unspecified location        Relevant Medications    levoFLOXacin (Levaquin) 500 mg tablet          Discussed patient's BMI and to institute calorie reduction and increase exercise to decrease risk of diabetes and heart disease in the future.    Tessalon Perles, Phenergan, and Levaquin prescribed today.    If Monday she is not improving, she will let us know, and we can send in a steroid.     If anything worsens or changes please call us at once, follow up in the office as planned.    Scribe Attestation  By signing my name below, I, Madhavi Trevizo MA, Scribe   attest that this documentation has been prepared under the direction and in the presence of Anatoliy Good DO.

## 2023-11-13 ENCOUNTER — OFFICE VISIT (OUTPATIENT)
Dept: ORTHOPEDIC SURGERY | Facility: CLINIC | Age: 53
End: 2023-11-13
Payer: COMMERCIAL

## 2023-11-13 DIAGNOSIS — M17.11 PRIMARY OSTEOARTHRITIS OF RIGHT KNEE: Primary | ICD-10-CM

## 2023-11-13 PROCEDURE — 99214 OFFICE O/P EST MOD 30 MIN: CPT | Performed by: ORTHOPAEDIC SURGERY

## 2023-11-13 PROCEDURE — 3075F SYST BP GE 130 - 139MM HG: CPT | Performed by: ORTHOPAEDIC SURGERY

## 2023-11-13 PROCEDURE — 3079F DIAST BP 80-89 MM HG: CPT | Performed by: ORTHOPAEDIC SURGERY

## 2023-11-13 PROCEDURE — 1036F TOBACCO NON-USER: CPT | Performed by: ORTHOPAEDIC SURGERY

## 2023-11-13 PROCEDURE — 2500000004 HC RX 250 GENERAL PHARMACY W/ HCPCS (ALT 636 FOR OP/ED): Performed by: ORTHOPAEDIC SURGERY

## 2023-11-13 PROCEDURE — 3008F BODY MASS INDEX DOCD: CPT | Performed by: ORTHOPAEDIC SURGERY

## 2023-11-13 PROCEDURE — 20610 DRAIN/INJ JOINT/BURSA W/O US: CPT | Performed by: ORTHOPAEDIC SURGERY

## 2023-11-13 PROCEDURE — 2500000005 HC RX 250 GENERAL PHARMACY W/O HCPCS: Performed by: ORTHOPAEDIC SURGERY

## 2023-11-13 RX ORDER — LIDOCAINE HYDROCHLORIDE 10 MG/ML
5 INJECTION INFILTRATION; PERINEURAL
Status: COMPLETED | OUTPATIENT
Start: 2023-11-13 | End: 2023-11-13

## 2023-11-13 RX ORDER — BETAMETHASONE SODIUM PHOSPHATE AND BETAMETHASONE ACETATE 3; 3 MG/ML; MG/ML
12 INJECTION, SUSPENSION INTRA-ARTICULAR; INTRALESIONAL; INTRAMUSCULAR; SOFT TISSUE
Status: COMPLETED | OUTPATIENT
Start: 2023-11-13 | End: 2023-11-13

## 2023-11-13 RX ADMIN — BETAMETHASONE ACETATE AND BETAMETHASONE SODIUM PHOSPHATE 12 MG: 3; 3 INJECTION, SUSPENSION INTRA-ARTICULAR; INTRALESIONAL; INTRAMUSCULAR; SOFT TISSUE at 10:35

## 2023-11-13 RX ADMIN — LIDOCAINE HYDROCHLORIDE 5 ML: 10 INJECTION, SOLUTION INFILTRATION; PERINEURAL at 10:35

## 2023-11-13 NOTE — PROGRESS NOTES
History: Mariana is here for her right knee.  She is having some recurrent pain in the knee.  We did a cortisone injection about 4 months ago which was helping for at least a few months.  She has tried gel in the past also with fairly minimal improvement.    Past medical history: Multiple  Medications: Multiple  Allergies: No known drug allergies    Please refer to the intake H&P regarding the patient's review of systems, family history and social history as was done today    HEENT: Normal  Lungs: Clear to auscultation  Heart: RRR  Abdomen: Soft, nontender  Skin: clear  Extremity: She has tenderness more medially.  1+ crepitus with range of motion.  Negative Lachman and posterior drawer.  Skin is clear throughout.  No numbness.  Contralateral exam is normal for strength, motion, stability and neurovascular assessment.    Radiographs: X-rays show moderate to severe degenerative change with more medial sided wear.    Assessment: Moderate severe right knee DJD, obesity    Plan: She needs to continue to work on a weight loss regimen.  She like to try another cortisone injection today.  We did discuss trying Zilretta as a long-acting cortisone down the road or repeating gel in the future.  These would need to be preauthorized.  We will see her back if her pain should recur.  L Inj/Asp: R knee on 11/13/2023 10:35 AM  Indications: pain  Details: 22 G needle, lateral approach  Medications: 12 mg betamethasone acet,sod phos 6 mg/mL; 5 mL lidocaine 10 mg/mL (1 %)  Outcome: tolerated well, no immediate complications  Procedure, treatment alternatives, risks and benefits explained, specific risks discussed. Consent was given by the patient. Immediately prior to procedure a time out was called to verify the correct patient, procedure, equipment, support staff and site/side marked as required. Patient was prepped and draped in the usual sterile fashion.          All questions were answered today with the patient.    This note  was generated with voice recognition software and may contain grammatical errors.

## 2023-12-01 DIAGNOSIS — I15.9 SECONDARY HYPERTENSION: ICD-10-CM

## 2023-12-01 RX ORDER — TERAZOSIN 5 MG/1
CAPSULE ORAL
Qty: 90 CAPSULE | Refills: 1 | Status: SHIPPED | OUTPATIENT
Start: 2023-12-01 | End: 2023-12-11 | Stop reason: DRUGHIGH

## 2023-12-06 DIAGNOSIS — I15.9 SECONDARY HYPERTENSION: ICD-10-CM

## 2023-12-06 RX ORDER — TELMISARTAN 80 MG/1
80 TABLET ORAL DAILY
Qty: 90 TABLET | Refills: 0 | Status: SHIPPED | OUTPATIENT
Start: 2023-12-06 | End: 2023-12-11 | Stop reason: SDUPTHER

## 2023-12-11 ENCOUNTER — OFFICE VISIT (OUTPATIENT)
Dept: PRIMARY CARE | Facility: CLINIC | Age: 53
End: 2023-12-11
Payer: COMMERCIAL

## 2023-12-11 VITALS
WEIGHT: 288 LBS | SYSTOLIC BLOOD PRESSURE: 138 MMHG | TEMPERATURE: 97.6 F | RESPIRATION RATE: 16 BRPM | DIASTOLIC BLOOD PRESSURE: 88 MMHG | HEART RATE: 86 BPM | HEIGHT: 66 IN | OXYGEN SATURATION: 93 % | BODY MASS INDEX: 46.28 KG/M2

## 2023-12-11 DIAGNOSIS — I15.9 SECONDARY HYPERTENSION: ICD-10-CM

## 2023-12-11 DIAGNOSIS — K21.9 GASTROESOPHAGEAL REFLUX DISEASE, UNSPECIFIED WHETHER ESOPHAGITIS PRESENT: ICD-10-CM

## 2023-12-11 DIAGNOSIS — M17.0 BILATERAL PRIMARY OSTEOARTHRITIS OF KNEE: ICD-10-CM

## 2023-12-11 DIAGNOSIS — I10 HYPERTENSION, UNSPECIFIED TYPE: ICD-10-CM

## 2023-12-11 DIAGNOSIS — M1A.09X0 CHRONIC GOUT OF MULTIPLE SITES, UNSPECIFIED CAUSE: ICD-10-CM

## 2023-12-11 DIAGNOSIS — E66.01 CLASS 3 SEVERE OBESITY DUE TO EXCESS CALORIES WITHOUT SERIOUS COMORBIDITY WITH BODY MASS INDEX (BMI) OF 45.0 TO 49.9 IN ADULT (MULTI): ICD-10-CM

## 2023-12-11 DIAGNOSIS — R31.9 URINARY TRACT INFECTION WITH HEMATURIA, SITE UNSPECIFIED: ICD-10-CM

## 2023-12-11 DIAGNOSIS — N39.0 URINARY TRACT INFECTION WITH HEMATURIA, SITE UNSPECIFIED: ICD-10-CM

## 2023-12-11 LAB
POC APPEARANCE, URINE: ABNORMAL
POC BILIRUBIN, URINE: NEGATIVE
POC BLOOD, URINE: ABNORMAL
POC COLOR, URINE: YELLOW
POC GLUCOSE, URINE: NEGATIVE MG/DL
POC KETONES, URINE: NEGATIVE MG/DL
POC LEUKOCYTES, URINE: ABNORMAL
POC NITRITE,URINE: POSITIVE
POC PH, URINE: 5.5 PH
POC PROTEIN, URINE: ABNORMAL MG/DL
POC SPECIFIC GRAVITY, URINE: >=1.03
POC UROBILINOGEN, URINE: 0.2 EU/DL

## 2023-12-11 PROCEDURE — 81003 URINALYSIS AUTO W/O SCOPE: CPT | Performed by: FAMILY MEDICINE

## 2023-12-11 PROCEDURE — 87186 SC STD MICRODIL/AGAR DIL: CPT

## 2023-12-11 PROCEDURE — 99214 OFFICE O/P EST MOD 30 MIN: CPT | Performed by: FAMILY MEDICINE

## 2023-12-11 PROCEDURE — 3008F BODY MASS INDEX DOCD: CPT | Performed by: FAMILY MEDICINE

## 2023-12-11 PROCEDURE — 1036F TOBACCO NON-USER: CPT | Performed by: FAMILY MEDICINE

## 2023-12-11 PROCEDURE — 3075F SYST BP GE 130 - 139MM HG: CPT | Performed by: FAMILY MEDICINE

## 2023-12-11 PROCEDURE — 3079F DIAST BP 80-89 MM HG: CPT | Performed by: FAMILY MEDICINE

## 2023-12-11 PROCEDURE — 87086 URINE CULTURE/COLONY COUNT: CPT

## 2023-12-11 RX ORDER — METOPROLOL SUCCINATE 100 MG/1
100 TABLET, EXTENDED RELEASE ORAL DAILY
Qty: 90 TABLET | Refills: 1 | Status: SHIPPED | OUTPATIENT
Start: 2023-12-11 | End: 2024-04-11 | Stop reason: SDUPTHER

## 2023-12-11 RX ORDER — IBUPROFEN 800 MG/1
800 TABLET ORAL 3 TIMES DAILY PRN
Qty: 90 TABLET | Refills: 1 | Status: SHIPPED | OUTPATIENT
Start: 2023-12-11 | End: 2024-04-11 | Stop reason: SDUPTHER

## 2023-12-11 RX ORDER — ALLOPURINOL 300 MG/1
300 TABLET ORAL DAILY
Qty: 90 TABLET | Refills: 1 | Status: SHIPPED | OUTPATIENT
Start: 2023-12-11 | End: 2024-04-11 | Stop reason: SDUPTHER

## 2023-12-11 RX ORDER — TERAZOSIN 10 MG/1
10 CAPSULE ORAL NIGHTLY
Qty: 90 CAPSULE | Refills: 1 | Status: SHIPPED | OUTPATIENT
Start: 2023-12-11 | End: 2024-04-11 | Stop reason: SDUPTHER

## 2023-12-11 RX ORDER — NITROFURANTOIN 25; 75 MG/1; MG/1
100 CAPSULE ORAL 2 TIMES DAILY
Qty: 14 CAPSULE | Refills: 0 | Status: SHIPPED | OUTPATIENT
Start: 2023-12-11 | End: 2023-12-18

## 2023-12-11 RX ORDER — PANTOPRAZOLE SODIUM 40 MG/1
40 TABLET, DELAYED RELEASE ORAL DAILY
Qty: 90 TABLET | Refills: 1 | Status: SHIPPED | OUTPATIENT
Start: 2023-12-11 | End: 2024-04-11 | Stop reason: SDUPTHER

## 2023-12-11 RX ORDER — TELMISARTAN 80 MG/1
80 TABLET ORAL DAILY
Qty: 90 TABLET | Refills: 0 | Status: SHIPPED | OUTPATIENT
Start: 2023-12-11 | End: 2024-03-08 | Stop reason: SDUPTHER

## 2023-12-11 RX ORDER — TERAZOSIN 5 MG/1
CAPSULE ORAL
Qty: 90 CAPSULE | Refills: 1 | Status: CANCELLED | OUTPATIENT
Start: 2023-12-11

## 2023-12-11 ASSESSMENT — PATIENT HEALTH QUESTIONNAIRE - PHQ9
2. FEELING DOWN, DEPRESSED OR HOPELESS: NOT AT ALL
SUM OF ALL RESPONSES TO PHQ9 QUESTIONS 1 AND 2: 0
1. LITTLE INTEREST OR PLEASURE IN DOING THINGS: NOT AT ALL

## 2023-12-11 NOTE — PROGRESS NOTES
"Subjective   Patient ID: Mariana Aguirre is a 53 y.o. female who presents for Hypertension and Urinary Frequency.  HPI    HTN follow up  Denies chest pain,SOB, swelling, headaches, lightheadedness or dizziness.   Eats a generally healthy diet, staying active.   Does not check BP at home.   Currently taking metoprolol succ, telmisartan, terazosin    Patient presents today for urinary frequency  States that this has been going on for over month  States that there is not burning with urination  States that there is not pain with urination  States that there is not lower back pain  States that there is not visible blood     Flu declined     No other concern or question  Review of systems  ; Patient seen today for exam denies any problems with headaches or vision, denies any shortness of breath chest pain nausea or vomiting, no black stool no blood in the stool no heartburn type symptoms denies any problems with constipation or diarrhea, and no dysuria-type symptoms    The patient's allergies medications were reviewed with them today    The patient's social family and surgical history or also reviewed here today, along with her past medical history.     Objective     Alert and active in  no acute distress  HEENT TMs clear oropharynx negative nares clear no drainage noted neck supple  With no adenopathy   Heart regular rate and rhythm without murmur and no carotid bruits  Lungs- clear to auscultation bilaterally, no wheeze or rhonchi noted  Thyroid -negative masses or nodularity  Abdomen- soft times four quadrants , bowel sounds positive no masses or organomegaly, negative tenderness guarding or rebound  Neurological exam unremarkable- DTRs in upper and lower extremities within normal limits.   skin -no lesions noted      Resp 16   Ht 1.676 m (5' 6\")   Wt 131 kg (288 lb)   BMI 46.48 kg/m²     Allergies   Allergen Reactions    Codeine Other     Gatrointestinal Upset    Hydrochlorothiazide Other     Edema, gout    " Morphine Unknown       Assessment/Plan   Problem List Items Addressed This Visit       GERD (gastroesophageal reflux disease)    Gout    Hypertension     Other Visit Diagnoses       Urinary tract infection with hematuria, site unspecified        Relevant Orders    POCT UA Automated manually resulted    Bilateral primary osteoarthritis of knee              Discussed patient's BMI and to institute calorie reduction and increase exercise to decrease risk of diabetes and heart disease in the future.    Refill Allopurinol, Ibuprofen, Metoprolol, Pantoprazole, Telmisartan.    Increase Terazosin to 10 mg.  Take Telmisartan in the AM.  Take Metoprolol and Terazosin in the PM.    UA performed, trace of blood and leukocytes present.  Sample will be sent for culture.    Discussed gastric sleeve with her.  Referral to Bariatric surgery ordered.    Macrobid prescribed today.    Labs have been ordered, she/he will have these performed and we will contact her/him with results.  (CBC, CMP, Lipid)    If anything worsens or changes please call us at once, follow up in the office as planned.    Scribe Attestation  By signing my name below, I, Madhavi Trevizo MA, Scribe   attest that this documentation has been prepared under the direction and in the presence of Anatoliy Good DO.

## 2023-12-13 ENCOUNTER — TELEPHONE (OUTPATIENT)
Dept: PRIMARY CARE | Facility: CLINIC | Age: 53
End: 2023-12-13
Payer: COMMERCIAL

## 2023-12-14 LAB — BACTERIA UR CULT: ABNORMAL

## 2023-12-21 ENCOUNTER — ANCILLARY PROCEDURE (OUTPATIENT)
Dept: RADIOLOGY | Facility: CLINIC | Age: 53
End: 2023-12-21
Payer: COMMERCIAL

## 2023-12-21 DIAGNOSIS — Z12.31 VISIT FOR SCREENING MAMMOGRAM: ICD-10-CM

## 2023-12-21 PROCEDURE — 77063 BREAST TOMOSYNTHESIS BI: CPT | Performed by: RADIOLOGY

## 2023-12-21 PROCEDURE — 77063 BREAST TOMOSYNTHESIS BI: CPT

## 2023-12-21 PROCEDURE — 77067 SCR MAMMO BI INCL CAD: CPT | Performed by: RADIOLOGY

## 2023-12-27 ENCOUNTER — TELEPHONE (OUTPATIENT)
Dept: PRIMARY CARE | Facility: CLINIC | Age: 53
End: 2023-12-27
Payer: COMMERCIAL

## 2023-12-27 ENCOUNTER — TELEPHONE (OUTPATIENT)
Dept: PRIMARY CARE | Facility: CLINIC | Age: 53
End: 2023-12-27

## 2023-12-27 DIAGNOSIS — N30.01 ACUTE CYSTITIS WITH HEMATURIA: Primary | ICD-10-CM

## 2023-12-27 RX ORDER — CIPROFLOXACIN 250 MG/1
250 TABLET, FILM COATED ORAL 2 TIMES DAILY
Qty: 14 TABLET | Refills: 0 | Status: SHIPPED | OUTPATIENT
Start: 2023-12-27 | End: 2024-01-03

## 2023-12-27 NOTE — TELEPHONE ENCOUNTER
PATIENT IS CALLING - SAW YOU ON 12/11/23 FOR A UTI - SHE DOESN'T SEE ANY BLOOD IN THE URINE ANY MORE BUT SHE STILL HAS URINE FREQUENCY - WONDERING IF SHE NEEDS ANOTHER ANTIBIOTIC?  PLEASE ADVISE.

## 2023-12-27 NOTE — TELEPHONE ENCOUNTER
----- Message from Anatoliy Good DO sent at 12/27/2023 11:24 AM EST -----  The patient's mammogram was normal, recheck in 1 year

## 2024-01-02 ENCOUNTER — APPOINTMENT (OUTPATIENT)
Dept: OBSTETRICS AND GYNECOLOGY | Facility: CLINIC | Age: 54
End: 2024-01-02
Payer: COMMERCIAL

## 2024-02-14 ENCOUNTER — OFFICE VISIT (OUTPATIENT)
Dept: ORTHOPEDIC SURGERY | Facility: CLINIC | Age: 54
End: 2024-02-14
Payer: COMMERCIAL

## 2024-02-14 ENCOUNTER — HOSPITAL ENCOUNTER (OUTPATIENT)
Dept: RADIOLOGY | Facility: CLINIC | Age: 54
Discharge: HOME | End: 2024-02-14
Payer: COMMERCIAL

## 2024-02-14 DIAGNOSIS — M76.61 ACHILLES TENDINITIS OF RIGHT LOWER EXTREMITY: ICD-10-CM

## 2024-02-14 DIAGNOSIS — M25.571 ACUTE RIGHT ANKLE PAIN: ICD-10-CM

## 2024-02-14 PROCEDURE — 1036F TOBACCO NON-USER: CPT | Performed by: ORTHOPAEDIC SURGERY

## 2024-02-14 PROCEDURE — 73610 X-RAY EXAM OF ANKLE: CPT | Mod: RIGHT SIDE | Performed by: ORTHOPAEDIC SURGERY

## 2024-02-14 PROCEDURE — 99214 OFFICE O/P EST MOD 30 MIN: CPT | Performed by: ORTHOPAEDIC SURGERY

## 2024-02-14 PROCEDURE — 73610 X-RAY EXAM OF ANKLE: CPT | Mod: RT

## 2024-02-14 PROCEDURE — 3008F BODY MASS INDEX DOCD: CPT | Performed by: ORTHOPAEDIC SURGERY

## 2024-02-14 RX ORDER — DICLOFENAC SODIUM 75 MG/1
75 TABLET, DELAYED RELEASE ORAL 2 TIMES DAILY
Qty: 60 TABLET | Refills: 2 | Status: SHIPPED | OUTPATIENT
Start: 2024-02-14 | End: 2024-05-14

## 2024-02-14 NOTE — PROGRESS NOTES
History: Mariana is here for her right heel.  She been having some persistent pain over the last few months.  She has trouble throughout the day with stiffness and soreness.  She been trying topical medicine at home.    Past medical history: Multiple  Medications: Multiple  Allergies: No known drug allergies    Please refer to the intake H&P regarding the patient's review of systems, family history and social history as was done today    HEENT: Normal  Lungs: Clear to auscultation  Heart: RRR  Abdomen: Soft, nontender  Skin: clear  Extremity: She has tenderness more toward the Achilles insertion.  Slight swelling at the insertion as well.  Mild soreness throughout the proximal Achilles and musculotendinous junction as well.  She has full motion with 5 out of 5 strength in all groups tested.  Normal Hidalgo's test.  No numbness or tingling.  Contralateral exam is normal for strength, motion, stability and neurovascular assessment.    Radiographs: X-rays show small spurs at the Achilles insertion as well as beneath the calcaneus.  No acute fractures noted.    Assessment: Right ankle insertional Achilles tendinitis    Plan: She has a fairly classic insertional tendinitis.  I like to get her started in some formal therapy with stretching and modalities including ultrasound.  She is also on to try some oral Voltaren.  She will stop if it causes any stomach upset.  She will get some gel heel pads for her heels and she will follow-up in 6 to 8 weeks for recheck.  If not improved we can consider further imaging or referral to the foot and ankle team as needed.  All questions were answered today with the patient.    This note was generated with voice recognition software and may contain grammatical errors.

## 2024-03-07 ENCOUNTER — APPOINTMENT (OUTPATIENT)
Dept: PRIMARY CARE | Facility: CLINIC | Age: 54
End: 2024-03-07
Payer: COMMERCIAL

## 2024-03-08 ENCOUNTER — OFFICE VISIT (OUTPATIENT)
Dept: PRIMARY CARE | Facility: CLINIC | Age: 54
End: 2024-03-08
Payer: COMMERCIAL

## 2024-03-08 VITALS
RESPIRATION RATE: 18 BRPM | HEART RATE: 78 BPM | WEIGHT: 288 LBS | SYSTOLIC BLOOD PRESSURE: 138 MMHG | BODY MASS INDEX: 46.28 KG/M2 | TEMPERATURE: 97.3 F | DIASTOLIC BLOOD PRESSURE: 86 MMHG | OXYGEN SATURATION: 97 % | HEIGHT: 66 IN

## 2024-03-08 DIAGNOSIS — E66.01 CLASS 3 SEVERE OBESITY DUE TO EXCESS CALORIES WITH SERIOUS COMORBIDITY AND BODY MASS INDEX (BMI) OF 45.0 TO 49.9 IN ADULT (MULTI): ICD-10-CM

## 2024-03-08 DIAGNOSIS — J01.11 ACUTE RECURRENT FRONTAL SINUSITIS: ICD-10-CM

## 2024-03-08 DIAGNOSIS — I15.9 SECONDARY HYPERTENSION: Primary | ICD-10-CM

## 2024-03-08 DIAGNOSIS — Z98.84 BARIATRIC SURGERY STATUS: ICD-10-CM

## 2024-03-08 DIAGNOSIS — K21.9 GASTROESOPHAGEAL REFLUX DISEASE, UNSPECIFIED WHETHER ESOPHAGITIS PRESENT: ICD-10-CM

## 2024-03-08 DIAGNOSIS — E66.01 CLASS 3 SEVERE OBESITY DUE TO EXCESS CALORIES WITHOUT SERIOUS COMORBIDITY WITH BODY MASS INDEX (BMI) OF 45.0 TO 49.9 IN ADULT (MULTI): ICD-10-CM

## 2024-03-08 DIAGNOSIS — M10.9 GOUT, UNSPECIFIED CAUSE, UNSPECIFIED CHRONICITY, UNSPECIFIED SITE: ICD-10-CM

## 2024-03-08 PROCEDURE — 3079F DIAST BP 80-89 MM HG: CPT | Performed by: FAMILY MEDICINE

## 2024-03-08 PROCEDURE — 1036F TOBACCO NON-USER: CPT | Performed by: FAMILY MEDICINE

## 2024-03-08 PROCEDURE — 99214 OFFICE O/P EST MOD 30 MIN: CPT | Performed by: FAMILY MEDICINE

## 2024-03-08 PROCEDURE — 3075F SYST BP GE 130 - 139MM HG: CPT | Performed by: FAMILY MEDICINE

## 2024-03-08 PROCEDURE — 3008F BODY MASS INDEX DOCD: CPT | Performed by: FAMILY MEDICINE

## 2024-03-08 RX ORDER — TELMISARTAN 80 MG/1
80 TABLET ORAL DAILY
Qty: 90 TABLET | Refills: 1 | Status: SHIPPED | OUTPATIENT
Start: 2024-03-08 | End: 2024-04-11 | Stop reason: SDUPTHER

## 2024-03-08 RX ORDER — CEFDINIR 300 MG/1
600 CAPSULE ORAL DAILY
Qty: 20 CAPSULE | Refills: 0 | Status: SHIPPED | OUTPATIENT
Start: 2024-03-08 | End: 2024-03-18

## 2024-03-08 RX ORDER — TELMISARTAN 80 MG/1
80 TABLET ORAL DAILY
Qty: 90 TABLET | Refills: 1 | Status: SHIPPED | OUTPATIENT
Start: 2024-03-08 | End: 2024-03-08 | Stop reason: SDUPTHER

## 2024-03-08 NOTE — PROGRESS NOTES
"ASubjective   Patient ID: Mariana Aguirre is a 53 y.o. female who presents for Hypertension, Results, and Sinus Problem.  HPI  HTN follow up  Denies chest pain,SOB, swelling, headaches, lightheadedness or dizziness.   Eats a generally healthy diet, staying active.   Does not check BP at home.   Currently taking metoprolol succ, telmisartan, terazosin    Sinus issues x2 day  Other symptoms cough , sneezing, coughing up mucus   No fever or chill  Has not tried OTC meds for symptoms       Pt want to go over lab result from [facility]     No other concern/question     Review of systems  ; Patient seen today for exam denies any problems with headaches or vision, denies any shortness of breath chest pain nausea or vomiting, no black stool no blood in the stool no heartburn type symptoms denies any problems with constipation or diarrhea, and no dysuria-type symptoms    The patient's allergies medications were reviewed with them today    The patient's social family and surgical history or also reviewed here today, along with her past medical history.     Objective     Alert and active in  no acute distress  HEENT TMs clear oropharynx negative nares clear no drainage noted neck supple  With no adenopathy   Heart regular rate and rhythm without murmur and no carotid bruits  Lungs- clear to auscultation bilaterally, no wheeze or rhonchi noted  Thyroid -negative masses or nodularity  Abdomen- soft times four quadrants , bowel sounds positive no masses or organomegaly, negative tenderness guarding or rebound  Neurological exam unremarkable- DTRs in upper and lower extremities within normal limits.   skin -no lesions noted      /86 (BP Location: Right arm, Patient Position: Sitting, BP Cuff Size: Adult)   Pulse 78   Temp 36.3 °C (97.3 °F) (Temporal)   Resp 18   Ht 1.676 m (5' 6\")   Wt 131 kg (288 lb)   SpO2 97%   BMI 46.48 kg/m²     Allergies   Allergen Reactions    Codeine Other     Gatrointestinal Upset    " Hydrochlorothiazide Other     Edema, gout    Morphine Unknown       Assessment/Plan   Problem List Items Addressed This Visit       GERD (gastroesophageal reflux disease)    Gout    Hypertension - Primary    Relevant Medications    telmisartan (MIcarDIS) 80 mg tablet     Other Visit Diagnoses       Class 3 severe obesity due to excess calories with serious comorbidity and body mass index (BMI) of 45.0 to 49.9 in adult (CMS/[facility])        Class 3 severe obesity due to excess calories without serious comorbidity with body mass index (BMI) of 45.0 to 49.9 in adult (CMS/[facility])        Acute recurrent frontal sinusitis        Relevant Medications    cefdinir (Omnicef) 300 mg capsule    Bariatric surgery status              Reviewed her labs from bariatric surgeon I think she will do fine and no problems from her standpoint mentally she is doing well her obstructive sleep apnea was not detected so she is cleared from the standpoint also    I think she we recommend her for bariatric sleeve procedure she will be followed benefits with decreased osteoarthritis of her knees also better blood pressure control which today is actually pretty good after she calmed down also decrease her diclofenac let us know if things worsen or change but we are very excited for her think she will do well    If anything worsens or changes please call us at once, follow up in the office as planned,

## 2024-03-27 ENCOUNTER — OFFICE VISIT (OUTPATIENT)
Dept: ORTHOPEDIC SURGERY | Facility: CLINIC | Age: 54
End: 2024-03-27
Payer: COMMERCIAL

## 2024-03-27 ENCOUNTER — APPOINTMENT (OUTPATIENT)
Dept: ORTHOPEDIC SURGERY | Facility: CLINIC | Age: 54
End: 2024-03-27
Payer: COMMERCIAL

## 2024-03-27 DIAGNOSIS — M17.11 PRIMARY OSTEOARTHRITIS OF RIGHT KNEE: Primary | ICD-10-CM

## 2024-03-27 PROCEDURE — 3008F BODY MASS INDEX DOCD: CPT | Performed by: PHYSICIAN ASSISTANT

## 2024-03-27 PROCEDURE — 20610 DRAIN/INJ JOINT/BURSA W/O US: CPT | Mod: RT | Performed by: PHYSICIAN ASSISTANT

## 2024-03-27 PROCEDURE — 99024 POSTOP FOLLOW-UP VISIT: CPT | Performed by: PHYSICIAN ASSISTANT

## 2024-03-27 PROCEDURE — 20610 DRAIN/INJ JOINT/BURSA W/O US: CPT | Performed by: ORTHOPAEDIC SURGERY

## 2024-03-27 PROCEDURE — 1036F TOBACCO NON-USER: CPT | Performed by: PHYSICIAN ASSISTANT

## 2024-03-27 PROCEDURE — 2500000004 HC RX 250 GENERAL PHARMACY W/ HCPCS (ALT 636 FOR OP/ED): Mod: JZ | Performed by: ORTHOPAEDIC SURGERY

## 2024-03-27 RX ORDER — DICLOFENAC SODIUM 75 MG/1
75 TABLET, DELAYED RELEASE ORAL 2 TIMES DAILY
Qty: 180 TABLET | Refills: 3 | Status: SHIPPED | OUTPATIENT
Start: 2024-03-27 | End: 2025-03-27

## 2024-03-27 RX ADMIN — TRIAMCINOLONE ACETONIDE EXTENDED-RELEASE INJECTABLE SUSPENSION 32 MG: KIT INTRA-ARTICULAR at 10:14

## 2024-03-27 NOTE — PROGRESS NOTES
Mariana is here for Zilretta injection into the right knee.    L Inj/Asp: R knee on 3/27/2024 10:14 AM  Indications: pain  Details: 22 G needle, lateral approach  Medications: 32 mg triamcinolone acetonide 32 mg  Procedure, treatment alternatives, risks and benefits explained, specific risks discussed. Consent was given by the patient. Immediately prior to procedure a time out was called to verify the correct patient, procedure, equipment, support staff and site/side marked as required. Patient was prepped and draped in the usual sterile fashion.         She will ice and work on range of motion and exercises as directed.    She will follow up in two months to assess progress.

## 2024-04-09 ENCOUNTER — DOCUMENTATION (OUTPATIENT)
Dept: SURGERY | Facility: HOSPITAL | Age: 54
End: 2024-04-09
Payer: COMMERCIAL

## 2024-04-09 NOTE — PROGRESS NOTES
[place] [facility] [place]. Samantha notified. Records uploaded.

## 2024-04-10 ENCOUNTER — APPOINTMENT (OUTPATIENT)
Dept: ORTHOPEDIC SURGERY | Facility: CLINIC | Age: 54
End: 2024-04-10
Payer: COMMERCIAL

## 2024-04-11 DIAGNOSIS — M17.0 BILATERAL PRIMARY OSTEOARTHRITIS OF KNEE: ICD-10-CM

## 2024-04-11 DIAGNOSIS — I15.9 SECONDARY HYPERTENSION: ICD-10-CM

## 2024-04-11 DIAGNOSIS — I10 HYPERTENSION, UNSPECIFIED TYPE: ICD-10-CM

## 2024-04-11 DIAGNOSIS — M1A.09X0 CHRONIC GOUT OF MULTIPLE SITES, UNSPECIFIED CAUSE: ICD-10-CM

## 2024-04-11 DIAGNOSIS — K21.9 GASTROESOPHAGEAL REFLUX DISEASE, UNSPECIFIED WHETHER ESOPHAGITIS PRESENT: ICD-10-CM

## 2024-04-11 RX ORDER — TELMISARTAN 80 MG/1
80 TABLET ORAL DAILY
Qty: 90 TABLET | Refills: 1 | Status: SHIPPED | OUTPATIENT
Start: 2024-04-11

## 2024-04-11 RX ORDER — PANTOPRAZOLE SODIUM 40 MG/1
40 TABLET, DELAYED RELEASE ORAL DAILY
Qty: 90 TABLET | Refills: 1 | Status: SHIPPED | OUTPATIENT
Start: 2024-04-11

## 2024-04-11 RX ORDER — ALLOPURINOL 300 MG/1
300 TABLET ORAL DAILY
Qty: 90 TABLET | Refills: 1 | Status: SHIPPED | OUTPATIENT
Start: 2024-04-11 | End: 2024-04-29

## 2024-04-11 RX ORDER — TERAZOSIN 10 MG/1
10 CAPSULE ORAL NIGHTLY
Qty: 90 CAPSULE | Refills: 1 | Status: SHIPPED | OUTPATIENT
Start: 2024-04-11 | End: 2024-10-08

## 2024-04-11 RX ORDER — METOPROLOL SUCCINATE 100 MG/1
100 TABLET, EXTENDED RELEASE ORAL DAILY
Qty: 90 TABLET | Refills: 1 | Status: SHIPPED | OUTPATIENT
Start: 2024-04-11

## 2024-04-11 RX ORDER — IBUPROFEN 800 MG/1
800 TABLET ORAL 3 TIMES DAILY PRN
Qty: 90 TABLET | Refills: 1 | Status: SHIPPED | OUTPATIENT
Start: 2024-04-11 | End: 2025-04-11

## 2024-04-11 NOTE — TELEPHONE ENCOUNTER
Rx Refill Request Telephone Encounter    Name:  Mariana Aguirre  : 1970     Medication Name:  ALLOPURINOL  Dose (Optional):    300 MG  Quantity (Optional):    90  Directions (Optional):   TAKE DAILY    Medication Name:  IBUPROFEN  Dose (Optional):    800 MG  Quantity (Optional):    90  Directions (Optional):   TAKE TID    Medication Name:  METOPROLOL SUCCINATE  Dose (Optional):    100 MG  Quantity (Optional):    90  Directions (Optional):   TAKE DAILY    Medication Name:  PANTOPRAZOLE  Dose (Optional):    40 MG  Quantity (Optional):    90  Directions (Optional):   TAKE DAILY    Medication Name:  TELMISARTAN  Dose (Optional):    80 MG  Quantity (Optional):    90  Directions (Optional):   TAKE DAILY    Medication Name:  TERAZOSIN  Dose (Optional):    10 MG  Quantity (Optional):    90  Directions (Optional):   TAKE DAILY    ALLERGIES:   SEE ATTACHED    Specific Pharmacy location:  [facility]    Date of last appointment:  2024  Date of next appointment:  NONE    Best number to reach patient:  [phone]

## 2024-04-26 PROBLEM — Z98.84 BARIATRIC SURGERY STATUS: Status: ACTIVE | Noted: 2024-04-26

## 2024-04-26 NOTE — PATIENT INSTRUCTIONS
The following are some lifestyle changes you should begin to prepare you for your surgery.   Eliminate soda and other carbonated beverages from your diet. Carbonation will not be well tolerated after surgery. Try Propel, Vitamin Water Zero, Sobe Lifewater, Crystal Light or water.    Increase fluid consumption to 64 oz daily. Do not drink within 30 minutes of eating as this will liquefy your food and make you hungry more quickly.    Exercise for 30-60 minutes daily. Brisk walking, bike riding and swimming are all examples of healthy exercise. If you are unable to exercise we recommend seated exercise.    Do not skip meals.    Take a multivitamin daily.    Lose weight. In preparation for your surgery it is important that you begin making healthier food choices now. Our dietitian will meet with you to help you select foods lower in calories and higher in nutrition. We would like you to lose at least 5-10 lbs prior to surgery.     Increase your protein intake to 60 grams per day.    Alcohol is empty calories. Please eliminate while preparing for surgery.    Plan your meals.      General Instruction:   1) Use the information we gave you today to work through your insurance requirements and medical clearances.   2) These documents need to get faxed or emailed to the program navigators so they can submit them for approval from your insurance company.   3) Obtain labs today at a  facility. We will call you with any abnormalities and corrections you need to make.   4) Continue to work with your primary care doctor and other specialist so your other health problems are well controlled prior to your surgery.   5) Adopt the recommendations of the program dietician so you develop healthy eating patterns.   6) Work with the sleep team to get your sleep apnea treated to prevent other health problems .   7) Consider attending a support group to learn from other who have been through the process.   8) Come to the  [facility] sessions.

## 2024-04-26 NOTE — PROGRESS NOTES
Subjective     Date: 5/3/2024 Time: 10:34 AM  Name: Mariana Aguirre  MRN: [id number]    This is a 53 y.o. female with morbid obesity (Body mass index is 46.84 kg/m².) who presents to clinic for consideration of bariatric surgery. she has attempted and failed multiple diet and exercise regimens for weight loss. Initial Onset of obesity was after pregnancy in '94 '98' '06.  Their goal for surgery is to  be healthier  and lose weight, and discontinue HTN meds. The patient has tried multiple diets to lose weight including Brown, Exercise, Low Fat, Weight Watchers, and Low Calorie. The patient was most successful with the Weight Watchers . The most pounds lost on this diet were 40 lbs. The patient considers their dietary weakness to be portion size The patient reports a  highest weight ever of 290 pounds and lowest weight ever of 200 pounds Distribution of Obesity: is central. The patient exercises 2 times /week  10 Minutes/Day Types of Exercise : aerobic conditioning  and walking    Comorbidities: esophageal reflux and hypertension controlled with oral meds  Patient Active Problem List   Diagnosis    Anemia    Chronic gout of left wrist    GERD (gastroesophageal reflux disease)    Gout    Hypertension    Left ventricular hypertrophy    Rash of back    Right knee DJD    BMI 45.0-49.9, adult (Multi)    Bariatric surgery status    LIEN (obstructive sleep apnea)       SLEEVE Gastric Surgery For Morbid Obesity Laparoscopic Longitudinal Gastrectomy     0 = No symptoms on PPI daily    PMH:   Past Medical History:   Diagnosis Date    Chronic serous otitis media, unspecified ear 11/22/2017    Chronic serous otitis media    Cramp and spasm 01/20/2021    Leg cramps    Encounter for immunization     Encounter for immunization    Encounter for other screening for malignant neoplasm of breast 09/10/2021    Breast cancer screening    Encounter for other screening for malignant neoplasm of breast     Screening for breast cancer     Encounter for screening for malignant neoplasm of colon     Screen for colon cancer    Encounter for screening for malignant neoplasm of colon     Colon cancer screening    Local infection of the skin and subcutaneous tissue, unspecified 09/14/2020    Skin infection    Mixed conductive and sensorineural hearing loss, bilateral 06/22/2017    Mixed hearing loss, bilateral    Other malaise 09/14/2020    Malaise and fatigue    Other specified disorders of nose and nasal sinuses 10/21/2021    Sinus pressure    Other specified disorders of synovium and tendon, unspecified ankle and foot 04/26/2021    Achilles tendon mass    Pain in right foot 09/14/2020    Right foot pain    Pain in unspecified knee 12/22/2021    Acute knee pain, unspecified laterality    Periapical abscess without sinus 07/28/2022    Tooth infection    Personal history of other diseases of the musculoskeletal system and connective tissue 09/10/2021    History of back pain    Personal history of other diseases of the nervous system and sense organs 10/21/2021    History of ear pain    Personal history of other diseases of the nervous system and sense organs 07/28/2022    History of ear pain    Personal history of other diseases of the respiratory system 11/22/2017    History of deviated nasal septum    Personal history of other diseases of the respiratory system 10/21/2021    History of sinusitis    Personal history of other diseases of the respiratory system 11/22/2017    History of chronic rhinitis    Personal history of other diseases of the respiratory system 10/26/2019    History of allergic rhinitis    Personal history of other infectious and parasitic diseases 05/09/2022    History of herpes zoster    Personal history of other medical treatment     History of screening mammography    Personal history of other specified conditions 06/23/2016    History of chest pain    Personal history of other specified conditions 01/20/2021    History of snoring     Personal history of other specified conditions 01/23/2020    History of wheezing    Rash and other nonspecific skin eruption 10/30/2019    Rash    Right upper quadrant pain 09/10/2021    Right upper quadrant abdominal pain    Superficial mycosis, unspecified     Fungal infection of skin    Unspecified menopausal and perimenopausal disorder 10/30/2020    Premenopausal patient        PSH:   Past Surgical History:   Procedure Laterality Date    BREAST SURGERY      breast reduction circa 2004    HERNIA REPAIR      age 9 months old    OTHER SURGICAL HISTORY  12/31/2020    No history of surgery          Denies personal/family hx of VTE.    FAMILY HISTORY:  Family History   Problem Relation Name Age of Onset    Hypertension Mother      Diabetes Mother      Hypertension Father      Sleep apnea Sister          SOCIAL HISTORY:  Social History     Tobacco Use    Smoking status: Never    Smokeless tobacco: Never   Vaping Use    Vaping status: Never Used   Substance Use Topics    Alcohol use: Never    Drug use: Never       MEDICATIONS:  Prior to Admission Medications:  Medication Documentation Review Audit       Reviewed by Joel Diaz MD (Physician) on 05/01/24 at 1109      Medication Order Taking? Sig Documenting Provider Last Dose Status     Discontinued 04/29/24 1320   allopurinol (Zyloprim) 300 mg tablet [id number] Yes TAKE 1 TABLET(300 MG) BY MOUTH EVERY DAY Anatoliy Good DO Taking Active   diclofenac (Voltaren) 75 mg EC tablet [id number] Yes Take 1 tablet (75 mg) by mouth 2 times a day. Mauricio Bowser MD Unknown Active   diclofenac (Voltaren) 75 mg EC tablet [id number] Yes Take 1 tablet (75 mg) by mouth 2 times a day. Mauricio Bowser MD Taking Active   ibuprofen 800 mg tablet [id number] Yes Take 1 tablet (800 mg) by mouth 3 times a day as needed for mild pain (1 - 3) (pain). Anatoliy Good DO Taking Active   metoprolol succinate XL (Toprol-XL) 100 mg 24 hr tablet [id number] Yes Take 1 tablet (100 mg) by mouth  once daily. Anatoliy Streetergomezlorie, DO Taking Active   pantoprazole (ProtoNix) 40 mg EC tablet [id number] Yes Take 1 tablet (40 mg) by mouth once daily. Anatoliy Good, DO Taking Active   telmisartan (MIcarDIS) 80 mg tablet [id number] Yes Take 1 tablet (80 mg) by mouth once daily. Anatoliy Bettylorie, DO Taking Active   terazosin (Hytrin) 10 mg capsule [id number] Yes Take 1 capsule (10 mg) by mouth once daily at bedtime. Anatoliy Good, DO Taking Active                     ALLERGIES:  Allergies   Allergen Reactions    Codeine Other     Gatrointestinal Upset    Hydrochlorothiazide Other     Edema, gout    Adhesive Tape-Silicones Rash    Morphine Unknown       REVIEW OF SYSTEMS:  GENERAL: Negative for malaise, significant weight loss and fever  HEAD: Negative for headache, swelling.  NECK: Negative for lumps, goiter, pain and significant neck swelling  RESPIRATORY: Negative for cough, wheezing or shortness of breath.  CARDIOVASCULAR: Negative for chest pain, leg swelling or palpitations.  GI: Negative for abdominal discomfort, blood in stools or black stools or change in bowel habits  : No history of dysuria, frequency or incontinence  MUSCULOSKELETAL: Negative for joint pain or swelling, back pain or muscle pain.  SKIN: Negative for lesions, rash, and itching.  PSYCH: Negative for sleep disturbance, mood disorder and recent psychosocial stressors.  ENDOCRINE: Negative for cold or heat intolerance, polyuria, polydipsia and goiter.    Objective   PHYSICAL EXAM:  Visit Vitals  /88 (BP Location: Right arm, Patient Position: Sitting, BP Cuff Size: Large adult long)   Pulse 86   Wt 132 kg (290 lb 3.2 oz)   SpO2 98%   BMI 46.84 kg/m²   OB Status Menopausal   Smoking Status Never   BSA 2.48 m²     General appearance: obese, NAD  Neuro: AOx3  Head: EOMI; no swelling or lesions of scalp or face  ENT:  no lumps or lymphadenopathy, thyroid normal to palpation; oropharynx clear, no swelling or erythema  Skin: warm, no erythema or  rashes  Lungs: clear to percussion and auscultation  Heart: regular rhythm and S1, S2 normal  Abdomen: soft, non-tender, no masses, no organomegaly  Extremities: Normal exam of the extremities. No swelling or pain.  Psych: no hurried speech, no flight of ideas, normal affect    Assessment/Plan   IMPRESSION:  Mariana Aguirre is a 53 y.o. female with a BMI of Body mass index is 46.84 kg/m². with the following diagnoses and co-morbidities:     Past Medical History:   Diagnosis Date    Chronic serous otitis media, unspecified ear 11/22/2017    Chronic serous otitis media    Cramp and spasm 01/20/2021    Leg cramps    Encounter for immunization     Encounter for immunization    Encounter for other screening for malignant neoplasm of breast 09/10/2021    Breast cancer screening    Encounter for other screening for malignant neoplasm of breast     Screening for breast cancer    Encounter for screening for malignant neoplasm of colon     Screen for colon cancer    Encounter for screening for malignant neoplasm of colon     Colon cancer screening    Local infection of the skin and subcutaneous tissue, unspecified 09/14/2020    Skin infection    Mixed conductive and sensorineural hearing loss, bilateral 06/22/2017    Mixed hearing loss, bilateral    Other malaise 09/14/2020    Malaise and fatigue    Other specified disorders of nose and nasal sinuses 10/21/2021    Sinus pressure    Other specified disorders of synovium and tendon, unspecified ankle and foot 04/26/2021    Achilles tendon mass    Pain in right foot 09/14/2020    Right foot pain    Pain in unspecified knee 12/22/2021    Acute knee pain, unspecified laterality    Periapical abscess without sinus 07/28/2022    Tooth infection    Personal history of other diseases of the musculoskeletal system and connective tissue 09/10/2021    History of back pain    Personal history of other diseases of the nervous system and sense organs 10/21/2021    History of ear pain     Personal history of other diseases of the nervous system and sense organs 07/28/2022    History of ear pain    Personal history of other diseases of the respiratory system 11/22/2017    History of deviated nasal septum    Personal history of other diseases of the respiratory system 10/21/2021    History of sinusitis    Personal history of other diseases of the respiratory system 11/22/2017    History of chronic rhinitis    Personal history of other diseases of the respiratory system 10/26/2019    History of allergic rhinitis    Personal history of other infectious and parasitic diseases 05/09/2022    History of herpes zoster    Personal history of other medical treatment     History of screening mammography    Personal history of other specified conditions 06/23/2016    History of chest pain    Personal history of other specified conditions 01/20/2021    History of snoring    Personal history of other specified conditions 01/23/2020    History of wheezing    Rash and other nonspecific skin eruption 10/30/2019    Rash    Right upper quadrant pain 09/10/2021    Right upper quadrant abdominal pain    Superficial mycosis, unspecified     Fungal infection of skin    Unspecified menopausal and perimenopausal disorder 10/30/2020    Premenopausal patient       This patient does meet the criteria for a surgical weight loss procedure according to NIH guidelines.    The risks of sleeve gastrectomy, Mary-en-Y gastric bypass including but not limited to bleeding, leak along staple lines, infection, dehydration, ulcers, internal hernia, DVT/PE, pneumonia, myocardial infarction, prolonged nausea/vomiting, incomplete resolution of associated medical conditions, reflux, weight regain, vitamin/mineral deficiencies, and death have been explained to the patient and Mariana Aguirre has expressed understanding and acceptance of them.     We discussed the lifestyle changes necessary to be successful following surgery.    She is coming  from [facility] and was being considered for sleeve gastrectomy there and wants to proceed with sleeve gastrectomy here as well.  Risk of continuing or even worsening reflux after sleeve gastrectomy was discussed.  All questions were answered.    The increased risk of substance and alcohol abuse following bariatric surgery was discussed with the patient, along with the negative consequences of substance/alcohol use after surgery including addiction, worsening of mental health disorders, and injury to the stomach. The risk of smoking and vaping (tobacco or any other substance) after bariatric surgery was explained to the patient. This includes risk of anastamotic ulcers, gastritis, bleeding, perforation, stricture, and PO intolerance.  The patient expressed understanding and acceptance of these risks.    The patient was advised not to become pregnant within 12-18 months following bariatric surgery. She was educated on the increased risks to mother and fetus associated with pregnancy within 2 years of bariatric surgery.    The benefits of the above surgeries including weight loss, improvement/resolution of associated medical and mental health conditions, improved mobility, and decreased mortality have been explained the the patient and Mariana Aguirre has expressed understanding and acceptance of them.      PLAN:  The plan of treatment for Mraiana Aguirre is to continue with the consultations and tests ordered today in hopes of qualifying for pre-operative clearance for bariatric surgery. This includes:    Consult Nutrition for education   Consult Psychology  Consult Cardiology - completed 3/6/24   Labs ordered - most completed through [facility]   EGD - completed 2/16/24  PCP for medical optimization - PCP form complete 1/11/24  Consult sleep medicine - sleep study 2/2/24 showing Mild LIEN   Recommend at least 10 lbs of weight loss prior to surgery.

## 2024-04-29 DIAGNOSIS — M1A.09X0 CHRONIC GOUT OF MULTIPLE SITES, UNSPECIFIED CAUSE: ICD-10-CM

## 2024-04-29 RX ORDER — ALLOPURINOL 300 MG/1
300 TABLET ORAL DAILY
Qty: 90 TABLET | Refills: 1 | Status: SHIPPED | OUTPATIENT
Start: 2024-04-29

## 2024-05-01 ENCOUNTER — OFFICE VISIT (OUTPATIENT)
Dept: SLEEP MEDICINE | Facility: CLINIC | Age: 54
End: 2024-05-01
Payer: COMMERCIAL

## 2024-05-01 DIAGNOSIS — E66.01 MORBID OBESITY WITH BMI OF 45.0-49.9, ADULT (MULTI): ICD-10-CM

## 2024-05-01 DIAGNOSIS — G47.33 OSA (OBSTRUCTIVE SLEEP APNEA): Primary | ICD-10-CM

## 2024-05-01 DIAGNOSIS — Z01.818 PREOPERATIVE CLEARANCE: ICD-10-CM

## 2024-05-01 DIAGNOSIS — Z98.84 BARIATRIC SURGERY STATUS: ICD-10-CM

## 2024-05-01 PROCEDURE — 3008F BODY MASS INDEX DOCD: CPT | Performed by: PSYCHIATRY & NEUROLOGY

## 2024-05-01 PROCEDURE — 1036F TOBACCO NON-USER: CPT | Performed by: PSYCHIATRY & NEUROLOGY

## 2024-05-01 PROCEDURE — 99204 OFFICE O/P NEW MOD 45 MIN: CPT | Performed by: PSYCHIATRY & NEUROLOGY

## 2024-05-03 ENCOUNTER — LAB (OUTPATIENT)
Dept: LAB | Facility: LAB | Age: 54
End: 2024-05-03
Payer: COMMERCIAL

## 2024-05-03 ENCOUNTER — NUTRITION (OUTPATIENT)
Dept: SURGERY | Facility: CLINIC | Age: 54
End: 2024-05-03
Payer: COMMERCIAL

## 2024-05-03 ENCOUNTER — OFFICE VISIT (OUTPATIENT)
Dept: SURGERY | Facility: CLINIC | Age: 54
End: 2024-05-03
Payer: COMMERCIAL

## 2024-05-03 VITALS
DIASTOLIC BLOOD PRESSURE: 88 MMHG | WEIGHT: 290.2 LBS | HEART RATE: 86 BPM | BODY MASS INDEX: 46.84 KG/M2 | OXYGEN SATURATION: 98 % | SYSTOLIC BLOOD PRESSURE: 174 MMHG

## 2024-05-03 DIAGNOSIS — E66.01 MORBID OBESITY WITH BODY MASS INDEX (BMI) OF 40.0 TO 49.9 (MULTI): Primary | ICD-10-CM

## 2024-05-03 DIAGNOSIS — G47.33 MILD OBSTRUCTIVE SLEEP APNEA: ICD-10-CM

## 2024-05-03 DIAGNOSIS — K21.9 GASTROESOPHAGEAL REFLUX DISEASE WITHOUT ESOPHAGITIS: ICD-10-CM

## 2024-05-03 DIAGNOSIS — Z13.21 ENCOUNTER FOR VITAMIN DEFICIENCY SCREENING: ICD-10-CM

## 2024-05-03 DIAGNOSIS — Z98.84 BARIATRIC SURGERY STATUS: ICD-10-CM

## 2024-05-03 DIAGNOSIS — E66.01 MORBID OBESITY (MULTI): ICD-10-CM

## 2024-05-03 DIAGNOSIS — I10 HYPERTENSION, UNSPECIFIED TYPE: ICD-10-CM

## 2024-05-03 DIAGNOSIS — I51.7 LEFT VENTRICULAR HYPERTROPHY: ICD-10-CM

## 2024-05-03 DIAGNOSIS — Z71.3 PRE-BARIATRIC SURGERY NUTRITION EVALUATION: ICD-10-CM

## 2024-05-03 DIAGNOSIS — M17.11 OSTEOARTHRITIS OF RIGHT KNEE, UNSPECIFIED OSTEOARTHRITIS TYPE: ICD-10-CM

## 2024-05-03 DIAGNOSIS — I15.9 SECONDARY HYPERTENSION: ICD-10-CM

## 2024-05-03 DIAGNOSIS — D50.9 IRON DEFICIENCY ANEMIA, UNSPECIFIED IRON DEFICIENCY ANEMIA TYPE: ICD-10-CM

## 2024-05-03 LAB
ALBUMIN SERPL BCP-MCNC: 4.1 G/DL (ref 3.4–5)
ALP SERPL-CCNC: 101 U/L (ref 33–110)
ALT SERPL W P-5'-P-CCNC: 24 U/L (ref 7–45)
ANION GAP SERPL CALC-SCNC: 14 MMOL/L (ref 10–20)
AST SERPL W P-5'-P-CCNC: 23 U/L (ref 9–39)
BASOPHILS # BLD AUTO: 0.02 X10*3/UL (ref 0–0.1)
BASOPHILS NFR BLD AUTO: 0.3 %
BILIRUB SERPL-MCNC: 0.3 MG/DL (ref 0–1.2)
BUN SERPL-MCNC: 23 MG/DL (ref 6–23)
C PEPTIDE SERPL-MCNC: 5.3 NG/ML (ref 0.7–3.9)
CALCIUM SERPL-MCNC: 8.9 MG/DL (ref 8.6–10.3)
CHLORIDE SERPL-SCNC: 108 MMOL/L (ref 98–107)
CHOLEST SERPL-MCNC: 194 MG/DL (ref 0–199)
CHOLESTEROL/HDL RATIO: 4.4
CO2 SERPL-SCNC: 25 MMOL/L (ref 21–32)
CREAT SERPL-MCNC: 0.98 MG/DL (ref 0.5–1.05)
EGFRCR SERPLBLD CKD-EPI 2021: 70 ML/MIN/1.73M*2
EOSINOPHIL # BLD AUTO: 0.21 X10*3/UL (ref 0–0.7)
EOSINOPHIL NFR BLD AUTO: 3.3 %
ERYTHROCYTE [DISTWIDTH] IN BLOOD BY AUTOMATED COUNT: 17 % (ref 11.5–14.5)
GLUCOSE SERPL-MCNC: 72 MG/DL (ref 74–99)
HCT VFR BLD AUTO: 32.4 % (ref 36–46)
HDLC SERPL-MCNC: 43.9 MG/DL
HGB BLD-MCNC: 10.1 G/DL (ref 12–16)
IMM GRANULOCYTES # BLD AUTO: 0.01 X10*3/UL (ref 0–0.7)
IMM GRANULOCYTES NFR BLD AUTO: 0.2 % (ref 0–0.9)
LDLC SERPL CALC-MCNC: 108 MG/DL
LYMPHOCYTES # BLD AUTO: 2.33 X10*3/UL (ref 1.2–4.8)
LYMPHOCYTES NFR BLD AUTO: 36.8 %
MCH RBC QN AUTO: 26 PG (ref 26–34)
MCHC RBC AUTO-ENTMCNC: 31.2 G/DL (ref 32–36)
MCV RBC AUTO: 84 FL (ref 80–100)
MONOCYTES # BLD AUTO: 0.4 X10*3/UL (ref 0.1–1)
MONOCYTES NFR BLD AUTO: 6.3 %
NEUTROPHILS # BLD AUTO: 3.36 X10*3/UL (ref 1.2–7.7)
NEUTROPHILS NFR BLD AUTO: 53.1 %
NON HDL CHOLESTEROL: 150 MG/DL (ref 0–149)
NRBC BLD-RTO: 0 /100 WBCS (ref 0–0)
PLATELET # BLD AUTO: 255 X10*3/UL (ref 150–450)
POTASSIUM SERPL-SCNC: 4.5 MMOL/L (ref 3.5–5.3)
PROT SERPL-MCNC: 7.1 G/DL (ref 6.4–8.2)
RBC # BLD AUTO: 3.88 X10*6/UL (ref 4–5.2)
SODIUM SERPL-SCNC: 142 MMOL/L (ref 136–145)
TRIGL SERPL-MCNC: 213 MG/DL (ref 0–149)
VLDL: 43 MG/DL (ref 0–40)
WBC # BLD AUTO: 6.3 X10*3/UL (ref 4.4–11.3)

## 2024-05-03 PROCEDURE — 3077F SYST BP >= 140 MM HG: CPT | Performed by: SURGERY

## 2024-05-03 PROCEDURE — 99215 OFFICE O/P EST HI 40 MIN: CPT | Performed by: SURGERY

## 2024-05-03 PROCEDURE — 80053 COMPREHEN METABOLIC PANEL: CPT

## 2024-05-03 PROCEDURE — 82525 ASSAY OF COPPER: CPT

## 2024-05-03 PROCEDURE — 3079F DIAST BP 80-89 MM HG: CPT | Performed by: SURGERY

## 2024-05-03 PROCEDURE — 99205 OFFICE O/P NEW HI 60 MIN: CPT | Performed by: SURGERY

## 2024-05-03 PROCEDURE — 85025 COMPLETE CBC W/AUTO DIFF WBC: CPT

## 2024-05-03 PROCEDURE — 3008F BODY MASS INDEX DOCD: CPT | Performed by: SURGERY

## 2024-05-03 PROCEDURE — 80323 ALKALOIDS NOS: CPT

## 2024-05-03 PROCEDURE — 84630 ASSAY OF ZINC: CPT

## 2024-05-03 PROCEDURE — 84681 ASSAY OF C-PEPTIDE: CPT

## 2024-05-03 PROCEDURE — 36415 COLL VENOUS BLD VENIPUNCTURE: CPT

## 2024-05-03 PROCEDURE — 84590 ASSAY OF VITAMIN A: CPT

## 2024-05-03 PROCEDURE — 80061 LIPID PANEL: CPT

## 2024-05-03 NOTE — PROGRESS NOTES
Surgeon: Helen  Patient is considering:  Sleeve gastrectomy     ASSESSMENT:  Current weight:  290.0 Ht:  63.0 in   BMI:  46.84        Initial start weight:     290.0   1/5/24 at [facility]  Pre-Op Excess Body Weight (EBW):    150.0  Target Post-Op weight goal:      192.5-215    Food allergies/intolerances: none, dislikes yogurt, cottage cheese and avocado  Chewing/Swallowing/Dentition:   has some missing teeth, no issues chewing  Nausea / Vomiting / Hx Gastroparesis:   no  Diarrhea/ Constipation: no  Exercise level: no planned  exercise  Smoking/Tobacco use:   no  Vitamins/Minerals supplements:   none  Past diet attempts:   WW worked the best,  BENITEZ, low calorie  Hours of sleep/night:     24 HOUR RECALL/DIET HISTORY:  Breakfast:   cheese stick and banana  Snack:    PB crackers  Lunch:   tuna sub from [employer]  Snack:   none  Dinner:    lobster, shrimp, and scallops  Snack:  none  Beverages: 20 oz pop/day, 2-3 8oz bottles juice/day, >64 oz water/day  Alcohol: none    Person responsible for cooking & shopping? Pt  does both  How often do you eat sweet snacks?    Not usually  How often do you eat savory snacks?    Pretzels 2x/week  How often do you eat out?    1-2x/week  Do you feel overly stuffed?    no  Binge Eating?  no  Night Eating?  no  Emotional Eating?  no         READINESS TO LEARN:  Motivation to learn: Interested        Understanding of instruction: Good   Anticipated Compliance: Good   Family Support: Pt's  present who is also here today for his initial visit.      Educational Materials Provided:    Pre-op Diet and sample menus                                             Nutrition Guidelines for Gastric Bypass and Sleeve Gastrectomy   Schedules for MSWL class and support group   1500 Calorie meal plan   Goals sheet      Nutrition assessment completed today.  Pt will be scheduled for a video education class at a later date to discuss the 2 week pre op diet, post op protein and fluid goals, vitamin and  mineral supplementation, exercise goals, and post op diet progression.     Patient is seekin  sleeve gastrectomy  surgery  Pt started the WLS process at [facility]. Her initial start weight was  290.0 on 1/5/24 and her last weight was 280 pounds on 3/18/24    Pt works  full time as a ; 6 am -9:45, 11:15 -12z;30 and 1:30-5 M-F.  She does not go home during breaks.  She lives with her  and daughter.     Pt has had an issue with weight since    Pt was able to lose 40-50 pounds following Weight Watchers.  Then she had a knee injury. She regained some weight as a result.  She is currently not exercising and not eating enough protein.  She drinks pop and juice daily.     REcommend following  1500 calorie meal plan.  Recommend eating 3 meals that include 3-4 oz protein and 1-2 high protein  snacks if needed.  Discussed meal and snack ideas.  Reviewed the postop behaviors to start practicing.  Set a goal to exercise for 30 min 3x/week.   Recommend not drinking any SSBs.        Patient was receptive to nutritional recommendations, asked numerous questions, and verbalized understanding of the weight loss surgery diet.  Patient expressed understanding about the importance of strict dietary compliance post-surgery to avoid nutritional deficiencies and achieve optimal weight loss and verbalized intent to follow dietary recommendations.    Malnutrition Screening:  Significant unintentional weight loss? n/a   Eating less than 75% of usual intake for more than 2 weeks? n/a      Nutrition Diagnosis:   1. Overweight/obesity related to excess energy intake as evidenced by BMI = 40 kg/m^2.  2. Food- and nutrition-related knowledge deficit related to lack of prior exposure to surgical weight loss information as evidenced by pt new to surgical program.    Nutrition Interventions:   1. Modify type and amount of food and nutrients within meals and snacks.  2. Comprehensive Nutrition Education    Recommendations:  1. Begin  following your meal plan.  Measure and record intake daily.   2. Structure meal patterns, eating three meals and 1-2 snacks per day.  3. Aim for 3-4 oz protein per meal.  Have 1-2 high protein snacks that are 10-20 g protein each.  You can try a tuna or chicken packet,  2 string cheeses, Protein bars like Quest, Pure Protein, Premier, or Built Bars. you can also try protein chips form Quest or Atkins.    4. Drink 64oz of calorie-free, caffeine-free, and non-carbonated beverages.  Stop drinking juice and pop.   5. Practice no drinking 30 minutes before meals, nothing with meals and wait 30 minutes after meals to drink again. Make meals last 30 minutes-chew thoroughly.   6. Limit or omit eating out/sweets/savory snacks to 1-2 times per week.  7. Begin daily multivitamin.  Flintstones Complete has everything you need.  8. Begin doing exercise of choice for 30 min 3x/week.  Increase physical activity by 10-15 minutes as tolerated to an end goal of 60 minutes 5 x per week. Consistency is the key.  9. Attend monthly Zoom bariatric support groups.     Pre-op Goal weight: lose 5% of body weight    Nutrition Monitoring and Evaluation: 1-2 pound weight loss per week  Criteria: weight check  Need for Follow-up:     Patient does meet National Institutes Health guidelines for weight loss surgery, however needs to demonstrate consistent effort in making dietary changes before giving clearance. It is anticipated that the patient will need at least 1-2 nutritional follow-up visits prior to clearance for surgery.

## 2024-05-06 ENCOUNTER — TELEPHONE (OUTPATIENT)
Dept: SLEEP MEDICINE | Facility: CLINIC | Age: 54
End: 2024-05-06
Payer: COMMERCIAL

## 2024-05-06 LAB
COPPER SERPL-MCNC: 127.2 UG/DL (ref 80–155)
ZINC SERPL-MCNC: 76.7 UG/DL (ref 60–120)

## 2024-05-06 NOTE — TELEPHONE ENCOUNTER
"Mariana called asking to speak with you. She is under the assumption that during her visit with you, you told her she didn't \"need\" CPAP and that it is up to her whether or not she decides to use one. She's sure you told her it wasn't necessary to have CPAP in order to be cleared for her surgery. Her surgeon told her she will not be able to have the surgery  if she has untreated LIEN. (She thinks you said her LIEN wasn't bad enough to require CPAP, but it could help her sleep better)   "

## 2024-05-07 NOTE — TELEPHONE ENCOUNTER
I corresponded with her bariatric surgeon and he informed me that treating mild LIEN (which this patient has) is up to the discretion of her treating sleep medicine provider and is not required for bariatric surgery, though treatment of severe sleep apnea is required prior to surgery.    I tried calling the patient to inform her of this, but the call went straight to voicemail. I left a message informing her that since I do not know who else may be able to listen to the call (there was no outgoing message) that I will send her a [employer] message response.

## 2024-05-08 ENCOUNTER — TELEPHONE (OUTPATIENT)
Dept: PRIMARY CARE | Facility: CLINIC | Age: 54
End: 2024-05-08
Payer: COMMERCIAL

## 2024-05-08 LAB
ANNOTATION COMMENT IMP: NORMAL
RETINYL PALMITATE SERPL-MCNC: <0.02 MG/L (ref 0–0.1)
VIT A SERPL-MCNC: 0.63 MG/L (ref 0.3–1.2)

## 2024-05-08 NOTE — TELEPHONE ENCOUNTER
PLEASE ROUTE TO CLERICAL POOL       PATIENT IS CALLING - WONDERING IF YOU CAN LOOK AT ALL HER LAB WORK THAT IS IN THE SYSTEM FROM YOU AND HER SPECIALISTS - WOULD LIKE TO KNOW IF THERE IS ANYTHING THAT SHE HASN'T HAD DRAWN?  IF SO, COULD WE PLEASE ORDER IT?  PLEASE ADVISE.

## 2024-05-09 LAB
COTININE SERPL-MCNC: <5 NG/ML
NICOTINE SERPL-MCNC: <5 NG/ML

## 2024-05-20 ENCOUNTER — TELEPHONE (OUTPATIENT)
Dept: PRIMARY CARE | Facility: CLINIC | Age: 54
End: 2024-05-20
Payer: COMMERCIAL

## 2024-05-20 NOTE — TELEPHONE ENCOUNTER
BLOODWORK CLARIFICATION     Patient knows your out of office. Patient calling and having questions about new medications apparently [facility] placed her on. Patient mentioned that [facility] bariatric put patient on metformin and was told that she was prediabetic and having her take other a multi with iron. The patient was advised to sign a medical records release since this is coming from [facility]      Patient wanted you to know on updated medications and if her latest labs she did for you were actually good.   Please advise and thank you

## 2024-05-29 ENCOUNTER — TELEPHONE (OUTPATIENT)
Dept: PRIMARY CARE | Facility: CLINIC | Age: 54
End: 2024-05-29

## 2024-05-29 ENCOUNTER — OFFICE VISIT (OUTPATIENT)
Dept: PRIMARY CARE | Facility: CLINIC | Age: 54
End: 2024-05-29
Payer: COMMERCIAL

## 2024-05-29 VITALS
HEIGHT: 66 IN | HEART RATE: 60 BPM | OXYGEN SATURATION: 94 % | RESPIRATION RATE: 18 BRPM | DIASTOLIC BLOOD PRESSURE: 88 MMHG | WEIGHT: 292 LBS | SYSTOLIC BLOOD PRESSURE: 138 MMHG | BODY MASS INDEX: 46.93 KG/M2

## 2024-05-29 DIAGNOSIS — J40 BRONCHITIS: Primary | ICD-10-CM

## 2024-05-29 DIAGNOSIS — I10 HYPERTENSION, UNSPECIFIED TYPE: Primary | ICD-10-CM

## 2024-05-29 DIAGNOSIS — G47.33 OSA (OBSTRUCTIVE SLEEP APNEA): ICD-10-CM

## 2024-05-29 DIAGNOSIS — E66.01 CLASS 3 SEVERE OBESITY DUE TO EXCESS CALORIES WITH SERIOUS COMORBIDITY AND BODY MASS INDEX (BMI) OF 45.0 TO 49.9 IN ADULT (MULTI): ICD-10-CM

## 2024-05-29 DIAGNOSIS — R05.1 ACUTE COUGH: ICD-10-CM

## 2024-05-29 PROCEDURE — 3079F DIAST BP 80-89 MM HG: CPT | Performed by: FAMILY MEDICINE

## 2024-05-29 PROCEDURE — 99214 OFFICE O/P EST MOD 30 MIN: CPT | Performed by: FAMILY MEDICINE

## 2024-05-29 PROCEDURE — 1036F TOBACCO NON-USER: CPT | Performed by: FAMILY MEDICINE

## 2024-05-29 PROCEDURE — 3008F BODY MASS INDEX DOCD: CPT | Performed by: FAMILY MEDICINE

## 2024-05-29 PROCEDURE — 3075F SYST BP GE 130 - 139MM HG: CPT | Performed by: FAMILY MEDICINE

## 2024-05-29 RX ORDER — LEVOFLOXACIN 500 MG/1
500 TABLET, FILM COATED ORAL DAILY
Qty: 10 TABLET | Refills: 0 | Status: SHIPPED | OUTPATIENT
Start: 2024-05-29 | End: 2024-06-08

## 2024-05-29 RX ORDER — COVID-19 ANTIGEN TEST
KIT MISCELLANEOUS
Qty: 2 EACH | Refills: 1 | Status: SHIPPED | OUTPATIENT
Start: 2024-05-29

## 2024-05-29 RX ORDER — METHYLPREDNISOLONE 4 MG/1
TABLET ORAL
Qty: 21 TABLET | Refills: 0 | Status: SHIPPED | OUTPATIENT
Start: 2024-05-29 | End: 2024-06-05

## 2024-05-29 NOTE — PROGRESS NOTES
"Subjective   Patient ID: Mariana Aguirre is a 53 y.o. female who presents for Cough.    HPI    pt is being seen for cough  ongoing for 5 days  other symptoms includes sinus,  pt did not test for Covid  pt is taking dayQuil and NyQuil- with no relief.   She states her mother in law also is having symptoms.   Denies fevers, chills, body aches.      No other concern /question     Review of systems  ; Patient seen today for exam denies any problems with headaches or vision, denies any shortness of breath chest pain nausea or vomiting, no black stool no blood in the stool no heartburn type symptoms denies any problems with constipation or diarrhea, and no dysuria-type symptoms    The patient's allergies medications were reviewed with them today    The patient's social family and surgical history or also reviewed here today, along with her past medical history.     Objective     Alert and active in  no acute distress  HEENT TMs clear oropharynx negative nares clear no drainage noted neck supple  With no adenopathy   Heart regular rate and rhythm without murmur and no carotid bruits  Lungs- wheezing noted.  Thyroid -negative masses or nodularity  Abdomen- soft times four quadrants , bowel sounds positive no masses or organomegaly, negative tenderness guarding or rebound  Neurological exam unremarkable- DTRs in upper and lower extremities within normal limits.   skin -no lesions noted      /88   Pulse 60   Resp 18   Ht 1.676 m (5' 6\")   Wt 132 kg (292 lb)   SpO2 94%   BMI 47.13 kg/m²     Allergies   Allergen Reactions    Codeine Other     Gatrointestinal Upset    Hydrochlorothiazide Other     Edema, gout    Adhesive Tape-Silicones Rash    Morphine Unknown       Assessment/Plan   Problem List Items Addressed This Visit       Hypertension - Primary    BMI 45.0-49.9, adult (Multi)    LIEN (obstructive sleep apnea)     Other Visit Diagnoses       Acute cough        Relevant Medications    COVID-19 antigen test " (COVID-19 At-Home Test) kit    levoFLOXacin (Levaquin) 500 mg tablet    methylPREDNISolone (Medrol Dospak) 4 mg tablets    Class 3 severe obesity due to excess calories with serious comorbidity and body mass index (BMI) of 45.0 to 49.9 in adult (Multi)              Discussed patient's BMI and to institute calorie reduction and increase exercise to decrease risk of diabetes and heart disease in the future.    She should test for COVID.   COVID tests prescribed today.    Discussed her sleep study showing obstructive sleep apnea she refuses at this time is planned to have bariatric surgery later this year at [facility]    Levaquin and Medrol dosepak prescribed today.     If anything worsens or changes please call us at once, follow up in the office as planned.    Scribe Attestation  By signing my name below, I, Madhavi Trevizo MA, Scribe   attest that this documentation has been prepared under the direction and in the presence of Anatoliy Good DO.

## 2024-05-29 NOTE — TELEPHONE ENCOUNTER
----- Message from Mariana Aguirre sent at 5/29/2024  3:45 PM EDT -----  Regarding: Covid test  Contact: [phone]  Just wanted you to knw I just took a covid test & it is NEGATIVE.   Thank you

## 2024-05-29 NOTE — TELEPHONE ENCOUNTER
Per Dr Good, ok to come at 1 today,she will try to get a home COVID test but if she can't he said it's ok

## 2024-05-29 NOTE — TELEPHONE ENCOUNTER
Patient is calling to see if she can try and get an appointment with you. Since Saturday, she's been coughing and now it's in her sinuses. She is going to take a home COVID test and aware that if comes back positive, we can turn the visit into a virtual visit. She wanted to check with you first to see if you could see her sometime today before opting for the convenient care.  Ok to squeeze her in today sometime?  Please advise  Thanks    DOL visit with you 3/8/24    Patient's # [phone]

## 2024-05-29 NOTE — TELEPHONE ENCOUNTER
Patient aware.     DO Jose Haley[facility] [facility]1 Clinical Support Staff17 hours ago (5:04 PM)       The metformin can help with sugars being regulated better also help with weight loss, she has a family history of diabetes but her last blood test were all okay

## 2024-05-30 RX ORDER — PROMETHAZINE HYDROCHLORIDE AND DEXTROMETHORPHAN HYDROBROMIDE 6.25; 15 MG/5ML; MG/5ML
5 SYRUP ORAL EVERY 4 HOURS PRN
Qty: 120 ML | Refills: 1 | Status: SHIPPED | OUTPATIENT
Start: 2024-05-30 | End: 2024-06-29

## 2024-05-30 NOTE — TELEPHONE ENCOUNTER
Patient is aware.    Anatoliy Good, DO  You57 minutes ago (8:35 AM)       I sent the Phenergan cough medicine for her thank you

## 2024-05-30 NOTE — TELEPHONE ENCOUNTER
----- Message from Mariana Aguirre sent at 5/29/2024  7:56 PM EDT -----  Regarding: Covid test  Contact: [phone]  I just realize I’m just about outta the cough meds he gave me the last time.  Could I please get a refilled sent to [facility].  Thank you

## 2024-06-04 ENCOUNTER — APPOINTMENT (OUTPATIENT)
Dept: SURGERY | Facility: CLINIC | Age: 54
End: 2024-06-04
Payer: COMMERCIAL

## 2024-06-11 ENCOUNTER — TELEMEDICINE CLINICAL SUPPORT (OUTPATIENT)
Dept: SURGERY | Facility: CLINIC | Age: 54
End: 2024-06-11
Payer: COMMERCIAL

## 2024-06-20 ENCOUNTER — TELEPHONE (OUTPATIENT)
Dept: PRIMARY CARE | Facility: CLINIC | Age: 54
End: 2024-06-20
Payer: COMMERCIAL

## 2024-06-20 NOTE — TELEPHONE ENCOUNTER
PLEASE ADVISE    ----- Message from Mariana Aguirre sent at 6/20/2024  6:18 AM EDT -----  Regarding: Sandy   Contact: [phone]  Good morning, Aidee was in to see Dr Lakhani when you were off.   He gave an antibiotic for I believe sinus infection.  She finished medicine & doing nose sprays & over the counter medicine but just doesn’t seem to be helping her.  Could you please let us knw what else she can do.  Thank you

## 2024-06-27 DIAGNOSIS — M17.11 PRIMARY OSTEOARTHRITIS OF RIGHT KNEE: ICD-10-CM

## 2024-06-28 RX ORDER — DICLOFENAC SODIUM 75 MG/1
75 TABLET, DELAYED RELEASE ORAL 2 TIMES DAILY
Qty: 180 TABLET | Refills: 1 | Status: SHIPPED | OUTPATIENT
Start: 2024-06-28 | End: 2024-12-25

## 2024-07-01 ENCOUNTER — OFFICE VISIT (OUTPATIENT)
Dept: ORTHOPEDIC SURGERY | Facility: CLINIC | Age: 54
End: 2024-07-01
Payer: COMMERCIAL

## 2024-07-01 DIAGNOSIS — M17.11 PRIMARY OSTEOARTHRITIS OF RIGHT KNEE: Primary | ICD-10-CM

## 2024-07-01 PROCEDURE — 2500000005 HC RX 250 GENERAL PHARMACY W/O HCPCS: Performed by: ORTHOPAEDIC SURGERY

## 2024-07-01 PROCEDURE — 20610 DRAIN/INJ JOINT/BURSA W/O US: CPT | Mod: RT | Performed by: ORTHOPAEDIC SURGERY

## 2024-07-01 PROCEDURE — 3008F BODY MASS INDEX DOCD: CPT | Performed by: ORTHOPAEDIC SURGERY

## 2024-07-01 PROCEDURE — 2500000004 HC RX 250 GENERAL PHARMACY W/ HCPCS (ALT 636 FOR OP/ED): Performed by: ORTHOPAEDIC SURGERY

## 2024-07-01 RX ORDER — BETAMETHASONE SODIUM PHOSPHATE AND BETAMETHASONE ACETATE 3; 3 MG/ML; MG/ML
12 INJECTION, SUSPENSION INTRA-ARTICULAR; INTRALESIONAL; INTRAMUSCULAR; SOFT TISSUE
Status: COMPLETED | OUTPATIENT
Start: 2024-07-01 | End: 2024-07-01

## 2024-07-01 RX ORDER — LIDOCAINE HYDROCHLORIDE 10 MG/ML
5 INJECTION INFILTRATION; PERINEURAL
Status: COMPLETED | OUTPATIENT
Start: 2024-07-01 | End: 2024-07-01

## 2024-07-01 NOTE — PROGRESS NOTES
Mariana is here for a right knee cortisone injection.  She is actually doing quite well with Zilretta but had a fall onto the knee again.  She like to do a cortisone shot today.    L Inj/Asp: R knee on 7/1/2024 10:12 AM  Indications: pain  Details: 22 G needle, lateral approach  Medications: 5 mL lidocaine 10 mg/mL (1 %); 12 mg betamethasone acet,sod phos 6 mg/mL  Outcome: tolerated well, no immediate complications  Procedure, treatment alternatives, risks and benefits explained, specific risks discussed. Consent was given by the patient. Immediately prior to procedure a time out was called to verify the correct patient, procedure, equipment, support staff and site/side marked as required. Patient was prepped and draped in the usual sterile fashion.         She will ice and work on range of motion and exercises as directed.    She will follow up on an as-needed basis.  We could repeat Zilretta in 3 months as it did seem to give her some decent relief.  She will wear her brace for support and avoid further trauma to the knee.  She needs to continue to work on a weight loss regimen as well.

## 2024-07-24 DIAGNOSIS — M17.11 PRIMARY OSTEOARTHRITIS OF RIGHT KNEE: Primary | ICD-10-CM

## 2024-07-24 RX ORDER — MELOXICAM 15 MG/1
15 TABLET ORAL DAILY
Qty: 30 TABLET | Refills: 2 | Status: SHIPPED | OUTPATIENT
Start: 2024-07-24 | End: 2024-10-22

## 2024-07-30 ENCOUNTER — NUTRITION (OUTPATIENT)
Dept: SURGERY | Facility: CLINIC | Age: 54
End: 2024-07-30
Payer: COMMERCIAL

## 2024-07-30 ENCOUNTER — APPOINTMENT (OUTPATIENT)
Dept: BEHAVIORAL HEALTH | Facility: CLINIC | Age: 54
End: 2024-07-30
Payer: COMMERCIAL

## 2024-07-30 VITALS — HEIGHT: 66 IN | WEIGHT: 278 LBS | BODY MASS INDEX: 44.68 KG/M2

## 2024-07-30 NOTE — PROGRESS NOTES
PREOPERATIVE, MULTIDISCIPLINARY, MEDICALLY SUPERVISED, REDUCED CALORIE DIET, BEHAVIOR MODIFICATION AND EXERCISE PROGRAM    S:  pt is drinking 64 oz of water w/lemon.  Pt is eating 2 meals that contain protein. She drinks a Greek yogurt drinks for breakfast and eats lunch and dinner.  She is practicing the 30-30-30 rule.  She is eating slowly and taking about 20-25 min to complete a meal.  She is practicing chewing well.     Usual intake  B;  Greek yogurt drink and string cheese  S;  not usually  L;    salad w/and eggs, or meat and cheese roll ups, sometimes cucumbers  S;   not usually   D:   salmon/chicken/white fish    veggies  S;  not usually     O:    Wt:  278.0     Ht:  66.0 in             BMI: 44.8    Goal: 5% body weight loss over the course of program    Dietary recommendation:   1.Continue to practice the 30-30-30 rule by drinking between meals.  2.Continue to  have 3 meals and 1 snack daily.  3 Have balanced meals that always contain a good source of protein.  Aim for 3-4 oz protein at lunch and dinner  4 Increase intake of non-starchy vegetables. Add veggies to lunch.   Have 5 servings fruits and vegetables daily.   5. When you finish you gummy multi, switch [employer] or [employer]'s children's chewable.    6 Increase physical activity by 10-15 minutes to an end goal of 60 minutes 5 x per week.   Do chair cardio exercise videos.     A/P:    pt is doing well.  She has lost 12 pounds since 1/5/24.  From a nutritional standpoint, she is cleared for surgery.  She will continue to follow recommendations.      Exercise:  not much d/t knee injury.     Rox Boss RD, LD

## 2024-07-31 ENCOUNTER — DOCUMENTATION (OUTPATIENT)
Dept: SURGERY | Facility: CLINIC | Age: 54
End: 2024-07-31
Payer: COMMERCIAL

## 2024-08-02 ENCOUNTER — DOCUMENTATION (OUTPATIENT)
Dept: SURGERY | Facility: CLINIC | Age: 54
End: 2024-08-02
Payer: COMMERCIAL

## 2024-08-12 ENCOUNTER — TELEPHONE (OUTPATIENT)
Dept: SURGERY | Facility: CLINIC | Age: 54
End: 2024-08-12
Payer: COMMERCIAL

## 2024-08-12 NOTE — TELEPHONE ENCOUNTER
Attempted to reach ins company regarding denial letter from ins.  Went around and around through voice response and finally I was disconnected.    Will try to call back

## 2024-08-13 ENCOUNTER — TELEPHONE (OUTPATIENT)
Dept: SURGERY | Facility: CLINIC | Age: 54
End: 2024-08-13
Payer: COMMERCIAL

## 2024-08-13 ENCOUNTER — DOCUMENTATION (OUTPATIENT)
Dept: SURGERY | Facility: CLINIC | Age: 54
End: 2024-08-13
Payer: COMMERCIAL

## 2024-08-13 NOTE — TELEPHONE ENCOUNTER
Spoke with Jessica, she told me that this case was denied on 8/2.. we have 180 days to file a peer to peer.  I told her that the info that they are stating that was not included really was.  States I can fax info to [phone] for a re-review consideration make sure I include [id number].    Call ref# [id number]

## 2024-08-13 NOTE — TELEPHONE ENCOUNTER
Patient called right back, told patient that I'm re sending file to ins for a re-review.  Told her if she receives anything from ins company to hold on as I'm still pursuing other options.   I just wanted to advise you that case was denied but still working.  Will follow up when I hear/receive addtl info

## 2024-08-13 NOTE — PROGRESS NOTES
Emailed Pearl at [facility] asking her to please review patient's records for an ECHO result.  If there's one to please send over as ins company is asking for

## 2024-08-13 NOTE — TELEPHONE ENCOUNTER
Call made to patient, left message.  Wanted to let her know case was denied, but working on getting it re-review

## 2024-08-13 NOTE — TELEPHONE ENCOUNTER
Received email back from Pearl..     She didn't' have it done at [facility]-  nor do we have copies.   I couldn't find in [facility] and it showed it outside records but no result showing    If you contact [facility] they may be able to assist as that is who her cardiac clearance was through and they are the ones that ordered- at that time they were independent.   [phone] Option 2

## 2024-08-13 NOTE — TELEPHONE ENCOUNTER
Called [facility] # [phone] option #2, left detailed message requesting ECHO info.  Please call  
medical evaluation

## 2024-08-26 ENCOUNTER — TELEPHONE (OUTPATIENT)
Dept: SURGERY | Facility: CLINIC | Age: 54
End: 2024-08-26
Payer: COMMERCIAL

## 2024-08-26 DIAGNOSIS — E66.01 MORBID OBESITY DUE TO EXCESS CALORIES (MULTI): ICD-10-CM

## 2024-08-26 DIAGNOSIS — Z98.84 BARIATRIC SURGERY STATUS: Primary | ICD-10-CM

## 2024-08-26 DIAGNOSIS — Z01.818 PREOPERATIVE CLEARANCE: ICD-10-CM

## 2024-08-27 ENCOUNTER — TELEPHONE (OUTPATIENT)
Dept: SURGERY | Facility: CLINIC | Age: 54
End: 2024-08-27
Payer: COMMERCIAL

## 2024-08-27 PROBLEM — E66.01 MORBID OBESITY DUE TO EXCESS CALORIES (MULTI): Status: ACTIVE | Noted: 2024-08-26

## 2024-08-30 ENCOUNTER — TELEPHONE (OUTPATIENT)
Dept: SURGERY | Facility: CLINIC | Age: 54
End: 2024-08-30
Payer: COMMERCIAL

## 2024-09-03 ENCOUNTER — TELEPHONE (OUTPATIENT)
Dept: SURGERY | Facility: CLINIC | Age: 54
End: 2024-09-03
Payer: COMMERCIAL

## 2024-09-03 NOTE — TELEPHONE ENCOUNTER
Spoke to pt and reviewed the 2 week preop diet.  Reminded to start on 9/30   Emailed the peop diet, sample meal plan and NG booklet    Rox Boss RD, DIANA

## 2024-09-05 ENCOUNTER — CLINICAL SUPPORT (OUTPATIENT)
Dept: SURGERY | Facility: CLINIC | Age: 54
End: 2024-09-05
Payer: COMMERCIAL

## 2024-09-05 NOTE — PROGRESS NOTES
Mariana Aguirre attended final pre op class. Patient participated in quiz and asked appropriate questions.    Index Surgery  Date of Surgery: 10/14/24   Surgeon: Dr. Olga Cervantes    Surgical Procedure: Laparoscopic sleeve gastrectomy  [id number]    Jasmina Arredondo

## 2024-09-20 ENCOUNTER — TELEPHONE (OUTPATIENT)
Dept: SURGERY | Facility: CLINIC | Age: 54
End: 2024-09-20
Payer: COMMERCIAL

## 2024-09-20 PROBLEM — E66.01 OBESITY, MORBID, BMI 40.0-49.9 (MULTI): Status: ACTIVE | Noted: 2023-12-11

## 2024-09-20 RX ORDER — OXYCODONE HCL 5 MG/5 ML
5 SOLUTION, ORAL ORAL EVERY 6 HOURS PRN
Qty: 140 ML | Refills: 0 | Status: CANCELLED | OUTPATIENT
Start: 2024-09-20 | End: 2024-09-27

## 2024-09-20 NOTE — H&P (VIEW-ONLY)
BARIATRIC SURGERY PREOPERATIVE VISIT    Date: 09/20/24  Time: [unfilled]    Name: Mariana Aguirre    MRN: [id number]    This is a 54 y.o. y.o. female with morbid obesity (Body mass index is 45.68 kg/m².) who plans to undergo Laparoscopic sleeve gastrectomy  97827 surgery. They have completed a rigorous preoperative medical work-up and bariatric surgery educational program.     PMH:   Patient Active Problem List   Diagnosis    Anemia    Chronic gout of left wrist    GERD (gastroesophageal reflux disease)    Gout    Hypertension    Left ventricular hypertrophy    Rash of back    Right knee DJD    Obesity, morbid, BMI 40.0-49.9 (Multi)    Bariatric surgery status    LIEN (obstructive sleep apnea)    Morbid obesity due to excess calories (Multi)       PSH:   Past Surgical History:   Procedure Laterality Date    BREAST SURGERY      breast reduction circa 2004    HERNIA REPAIR      age 9 months old    OTHER SURGICAL HISTORY  12/31/2020    No history of surgery       Social hx:   Social History     Socioeconomic History    Marital status:      Spouse name: Not on file    Number of children: Not on file    Years of education: Not on file    Highest education level: Not on file   Occupational History    Not on file   Tobacco Use    Smoking status: Never    Smokeless tobacco: Never   Vaping Use    Vaping status: Never Used   Substance and Sexual Activity    Alcohol use: Never    Drug use: Never    Sexual activity: Yes   Other Topics Concern    Not on file   Social History Narrative    Not on file     Social Determinants of Health     Financial Resource Strain: Low Risk  (12/21/2023)    [facility]    Overall Financial Resource Strain (CARDIA)     Difficulty of Paying Living Expenses: Not hard at all   Food Insecurity: No Food Insecurity (12/21/2023)    [facility] from [facility]    Hunger Vital  Sign     Worried About Running Out of Food in the Last Year: Never true     Ran Out of Food in the Last Year: Never true   Transportation Needs: Unknown (12/21/2023)    Received from [facility]    PRAPARE - Transportation     Lack of Transportation (Medical): Not on file     Lack of Transportation (Non-Medical): No   Physical Activity: Not on file   Stress: Not on file   Social Connections: Not on file   Intimate Partner Violence: Not on file   Housing Stability: Unknown (12/21/2023)    Received from [facility]    Housing Stability Vital Sign     Unable to Pay for Housing in the Last Year: Not on file     Number of Places Lived in the Last Year: Not on file     Unstable Housing in the Last Year: No       Initial weight: 290  Current weight: There were no vitals filed for this visit.    Preop Clearances:  Cardiac: Cleared  Psych: Cleared    History of Clotting Disorder: N/A  Anticoagulation plan: N/A    Sleep Study: Mild apnea; No CPAP required per sleep med     Endoscopy 2/16/24 @ [facility] line irregular @35cm  Diffuse Gastritis   Pathology negative for H Pylori     Preadmission testing date: PENDING     MEDICATIONS:  Prior to Admission Medications:    Current Outpatient Medications:     allopurinol (Zyloprim) 300 mg tablet, TAKE 1 TABLET(300 MG) BY MOUTH EVERY DAY, Disp: 90 tablet, Rfl: 1    COVID-19 antigen test (COVID-19 At-Home Test) kit, Use test as instructed, Disp: 2 each, Rfl: 1    diclofenac (Voltaren) 75 mg EC tablet, Take 1 tablet (75 mg) by mouth 2 times a day., Disp: 180 tablet, Rfl: 1    ibuprofen 800 mg tablet, Take 1 tablet (800 mg) by mouth 3 times a day as needed for mild pain (1 - 3) (pain). (Patient not taking: Reported on 5/3/2024), Disp: 90 tablet, Rfl: 1    meloxicam (Mobic) 15 mg tablet, Take 1 tablet (15 mg) by mouth once daily., Disp: 30 tablet, Rfl: 2    metoprolol succinate  XL (Toprol-XL) 100 mg 24 hr tablet, Take 1 tablet (100 mg) by mouth once daily., Disp: 90 tablet, Rfl: 1    pantoprazole (ProtoNix) 40 mg EC tablet, Take 1 tablet (40 mg) by mouth once daily., Disp: 90 tablet, Rfl: 1    telmisartan (MIcarDIS) 80 mg tablet, Take 1 tablet (80 mg) by mouth once daily., Disp: 90 tablet, Rfl: 1    terazosin (Hytrin) 10 mg capsule, Take 1 capsule (10 mg) by mouth once daily at bedtime., Disp: 90 capsule, Rfl: 1    ALLERGIES:  Allergies   Allergen Reactions    Codeine Other     Gatrointestinal Upset    Hydrochlorothiazide Other     Edema, gout    Adhesive Tape-Silicones Rash    Morphine Unknown       REVIEW OF SYSTEMS:  GENERAL: Obese. Negative for malaise, significant weight loss and fever  NECK: Negative for lumps, goiter, pain and significant neck swelling  RESPIRATORY: Negative for cough, wheezing or shortness of breath.  CARDIOVASCULAR: Negative for chest pain, leg swelling or palpitations.  GI: Negative for abdominal discomfort, blood in stools or black stools or change in bowel habits  : No history of dysuria, frequency or incontinence  MUSCULOSKELETAL: Negative for joint pain or swelling, back pain or muscle pain.  SKIN: Negative for lesions, rash, and itching.  PSYCH: Negative for sleep disturbance, mood disorder and recent psychosocial stressors.  ENDOCRINE: Negative for cold or heat intolerance, polyuria, polydipsia and goiter.    PHYSICAL EXAM:  There were no vitals taken for this visit.    General appearance: obese  Skin: warm, no erythema or rashes  Lungs: clear to percussion and auscultation  Heart: regular rhythm and S1, S2 normal  Abdomen: soft, non-tender, no masses, no organomegaly  Extremities: Normal exam of the extremities. No swelling or pain.    No results found for this or any previous visit (from the past 24 hour(s)).    IMPRESSION:  Mariana Aguirre is a 54 y.o. y.o. female with a BMI of There is no height or weight on file to calculate BMI..    They have been  preoperatively evaluated and deemed to be an appropriate candidate for bariatric surgery.  Surgery Type: Laparoscopic sleeve gastrectomy  54346    All testing reviewed.  All clearances contained.    PLAN:  The risks of Laparoscopic sleeve gastrectomy  54301 surgery including bleeding, leak, wound infection, dehydration, chronic GERD, possible need for gastric bypass,, DVT/PE, prolonged nausea/vomiting, incomplete resolution of associated medical conditions, reflux, weight regain, vitamin/mineral deficiencies, and death have been explained to the patient and Mariana Aguirre has expressed understanding and acceptance of them.    The possible benefits of the above surgery including weight loss, improvement/resolution of associated medical and mental health conditions, improved mobility, and decreased mortality have been explained the the patient and Mariana Aguirre has expressed understanding and acceptance of them.    Operative and blood transfusion consent forms were signed by the patient and witnessed today.    Prescriptions for all required post-operative home medications were sent to the patient's pharmacy today and the patient will pick them up prior to surgery.    Further education was provided on day of surgery instructions and what to expect from the inpatient admission after surgery.    Olga Cervantes MD   Bariatric and Minimally Invasive General Surgery

## 2024-09-20 NOTE — TELEPHONE ENCOUNTER
Outbound call to patient to see if she has taken/tolerated Oxycodone or Tramadol in the past given her allergy on file to Codeine and Morphine.  Awaiting a call back.

## 2024-09-20 NOTE — PROGRESS NOTES
BARIATRIC SURGERY PREOPERATIVE VISIT    Date: 09/20/24  Time: [unfilled]    Name: Mariana Aguirre    MRN: [id number]    This is a 54 y.o. y.o. female with morbid obesity (Body mass index is 45.68 kg/m².) who plans to undergo Laparoscopic sleeve gastrectomy  15907 surgery. They have completed a rigorous preoperative medical work-up and bariatric surgery educational program.     PMH:   Patient Active Problem List   Diagnosis    Anemia    Chronic gout of left wrist    GERD (gastroesophageal reflux disease)    Gout    Hypertension    Left ventricular hypertrophy    Rash of back    Right knee DJD    Obesity, morbid, BMI 40.0-49.9 (Multi)    Bariatric surgery status    LIEN (obstructive sleep apnea)    Morbid obesity due to excess calories (Multi)       PSH:   Past Surgical History:   Procedure Laterality Date    BREAST SURGERY      breast reduction circa 2004    HERNIA REPAIR      age 9 months old    OTHER SURGICAL HISTORY  12/31/2020    No history of surgery       Social hx:   Social History     Socioeconomic History    Marital status:      Spouse name: Not on file    Number of children: Not on file    Years of education: Not on file    Highest education level: Not on file   Occupational History    Not on file   Tobacco Use    Smoking status: Never    Smokeless tobacco: Never   Vaping Use    Vaping status: Never Used   Substance and Sexual Activity    Alcohol use: Never    Drug use: Never    Sexual activity: Yes   Other Topics Concern    Not on file   Social History Narrative    Not on file     Social Determinants of Health     Financial Resource Strain: Low Risk  (12/21/2023)    Received from [facility]    Overall Financial Resource Strain (CARDIA)     Difficulty of Paying Living Expenses: Not hard at all   Food Insecurity: No Food Insecurity (12/21/2023)    Received from [facility]    Hunger Vital  Sign     Worried About Running Out of Food in the Last Year: Never true     Ran Out of Food in the Last Year: Never true   Transportation Needs: Unknown (12/21/2023)    Received from [facility]    PRAPARE - Transportation     Lack of Transportation (Medical): Not on file     Lack of Transportation (Non-Medical): No   Physical Activity: Not on file   Stress: Not on file   Social Connections: Not on file   Intimate Partner Violence: Not on file   Housing Stability: Unknown (12/21/2023)    Received from [facility]    Housing Stability Vital Sign     Unable to Pay for Housing in the Last Year: Not on file     Number of Places Lived in the Last Year: Not on file     Unstable Housing in the Last Year: No       Initial weight: 290  Current weight: There were no vitals filed for this visit.    Preop Clearances:  Cardiac: Cleared  Psych: Cleared    History of Clotting Disorder: N/A  Anticoagulation plan: N/A    Sleep Study: Mild apnea; No CPAP required per sleep med     Endoscopy 2/16/24 @ [facility] line irregular @35cm  Diffuse Gastritis   Pathology negative for H Pylori     Preadmission testing date: PENDING     MEDICATIONS:  Prior to Admission Medications:    Current Outpatient Medications:     allopurinol (Zyloprim) 300 mg tablet, TAKE 1 TABLET(300 MG) BY MOUTH EVERY DAY, Disp: 90 tablet, Rfl: 1    COVID-19 antigen test (COVID-19 At-Home Test) kit, Use test as instructed, Disp: 2 each, Rfl: 1    diclofenac (Voltaren) 75 mg EC tablet, Take 1 tablet (75 mg) by mouth 2 times a day., Disp: 180 tablet, Rfl: 1    ibuprofen 800 mg tablet, Take 1 tablet (800 mg) by mouth 3 times a day as needed for mild pain (1 - 3) (pain). (Patient not taking: Reported on 5/3/2024), Disp: 90 tablet, Rfl: 1    meloxicam (Mobic) 15 mg tablet, Take 1 tablet (15 mg) by mouth once daily., Disp: 30 tablet, Rfl: 2    metoprolol succinate  XL (Toprol-XL) 100 mg 24 hr tablet, Take 1 tablet (100 mg) by mouth once daily., Disp: 90 tablet, Rfl: 1    pantoprazole (ProtoNix) 40 mg EC tablet, Take 1 tablet (40 mg) by mouth once daily., Disp: 90 tablet, Rfl: 1    telmisartan (MIcarDIS) 80 mg tablet, Take 1 tablet (80 mg) by mouth once daily., Disp: 90 tablet, Rfl: 1    terazosin (Hytrin) 10 mg capsule, Take 1 capsule (10 mg) by mouth once daily at bedtime., Disp: 90 capsule, Rfl: 1    ALLERGIES:  Allergies   Allergen Reactions    Codeine Other     Gatrointestinal Upset    Hydrochlorothiazide Other     Edema, gout    Adhesive Tape-Silicones Rash    Morphine Unknown       REVIEW OF SYSTEMS:  GENERAL: Obese. Negative for malaise, significant weight loss and fever  NECK: Negative for lumps, goiter, pain and significant neck swelling  RESPIRATORY: Negative for cough, wheezing or shortness of breath.  CARDIOVASCULAR: Negative for chest pain, leg swelling or palpitations.  GI: Negative for abdominal discomfort, blood in stools or black stools or change in bowel habits  : No history of dysuria, frequency or incontinence  MUSCULOSKELETAL: Negative for joint pain or swelling, back pain or muscle pain.  SKIN: Negative for lesions, rash, and itching.  PSYCH: Negative for sleep disturbance, mood disorder and recent psychosocial stressors.  ENDOCRINE: Negative for cold or heat intolerance, polyuria, polydipsia and goiter.    PHYSICAL EXAM:  There were no vitals taken for this visit.    General appearance: obese  Skin: warm, no erythema or rashes  Lungs: clear to percussion and auscultation  Heart: regular rhythm and S1, S2 normal  Abdomen: soft, non-tender, no masses, no organomegaly  Extremities: Normal exam of the extremities. No swelling or pain.    No results found for this or any previous visit (from the past 24 hour(s)).    IMPRESSION:  Mariana Aguirre is a 54 y.o. y.o. female with a BMI of There is no height or weight on file to calculate BMI..    They have been  preoperatively evaluated and deemed to be an appropriate candidate for bariatric surgery.  Surgery Type: Laparoscopic sleeve gastrectomy  75525    All testing reviewed.  All clearances contained.    PLAN:  The risks of Laparoscopic sleeve gastrectomy  30152 surgery including bleeding, leak, wound infection, dehydration, chronic GERD, possible need for gastric bypass,, DVT/PE, prolonged nausea/vomiting, incomplete resolution of associated medical conditions, reflux, weight regain, vitamin/mineral deficiencies, and death have been explained to the patient and Mariana Aguirre has expressed understanding and acceptance of them.    The possible benefits of the above surgery including weight loss, improvement/resolution of associated medical and mental health conditions, improved mobility, and decreased mortality have been explained the the patient and Mariana Aguirre has expressed understanding and acceptance of them.    Operative and blood transfusion consent forms were signed by the patient and witnessed today.    Prescriptions for all required post-operative home medications were sent to the patient's pharmacy today and the patient will pick them up prior to surgery.    Further education was provided on day of surgery instructions and what to expect from the inpatient admission after surgery.    Olga Cervantes MD   Bariatric and Minimally Invasive General Surgery

## 2024-09-20 NOTE — PATIENT INSTRUCTIONS
You are scheduled for a: Sleeve Gastrectomy with Dr. Cervantes on 10/14/2024     YOU DO NOT NEED TO DO A BOWEL PREP.  You will be on clear liquids only starting the day prior to surgery. Please refer to your final pre op book/RD instructions.     You will receive a phone call the day prior to surgery with your OR arrival time.  Be sure to read over your Pre-Op book for final preparation for surgery.  Make a shopping list and  your supplements prior to surgery.     Prescriptions for Omeprazole (antacid), Oxycodone (pain),  and Ondansetron (anti-Nausea) will be sent to the [facility] and delivered to your bedside during your stay unless you have opted to have us send them to your local pharmacy.     Be sure to take the omeprazole every day for six months starting when you get home from the hospital. Open the capsule and sprinkle over SF applesauce, pudding or yogurt. DO NOT MISS A DOSE.     Call with any questions! [phone] for Jasmina.

## 2024-09-24 ENCOUNTER — TELEPHONE (OUTPATIENT)
Dept: SURGERY | Facility: CLINIC | Age: 54
End: 2024-09-24
Payer: COMMERCIAL

## 2024-09-24 NOTE — TELEPHONE ENCOUNTER
Called [employer] @ [phone], went through automated services and kept getting disconnected.. will try back

## 2024-09-25 ENCOUNTER — TELEPHONE (OUTPATIENT)
Dept: SURGERY | Facility: CLINIC | Age: 54
End: 2024-09-25
Payer: COMMERCIAL

## 2024-09-25 NOTE — TELEPHONE ENCOUNTER
Called [employer] @ [phone] per rep she emailed the clinical team to update DOS (10/14).  Stated if they need any addtl info they will reach me at [phone].

## 2024-09-26 ENCOUNTER — OFFICE VISIT (OUTPATIENT)
Dept: SURGERY | Facility: CLINIC | Age: 54
End: 2024-09-26
Payer: COMMERCIAL

## 2024-09-26 VITALS
SYSTOLIC BLOOD PRESSURE: 182 MMHG | HEIGHT: 66 IN | OXYGEN SATURATION: 98 % | HEART RATE: 77 BPM | RESPIRATION RATE: 22 BRPM | BODY MASS INDEX: 45.48 KG/M2 | WEIGHT: 283 LBS | DIASTOLIC BLOOD PRESSURE: 96 MMHG

## 2024-09-26 DIAGNOSIS — K21.9 GASTROESOPHAGEAL REFLUX DISEASE WITHOUT ESOPHAGITIS: ICD-10-CM

## 2024-09-26 DIAGNOSIS — E66.01 MORBID OBESITY DUE TO EXCESS CALORIES (MULTI): ICD-10-CM

## 2024-09-26 DIAGNOSIS — Z71.3 PRE-BARIATRIC SURGERY NUTRITION EVALUATION: ICD-10-CM

## 2024-09-26 DIAGNOSIS — E66.01 OBESITY, MORBID, BMI 40.0-49.9 (MULTI): Primary | ICD-10-CM

## 2024-09-26 DIAGNOSIS — G89.18 POST-OP PAIN: ICD-10-CM

## 2024-09-26 DIAGNOSIS — Z98.890 POST-OPERATIVE NAUSEA AND VOMITING: ICD-10-CM

## 2024-09-26 DIAGNOSIS — Z98.84 BARIATRIC SURGERY STATUS: ICD-10-CM

## 2024-09-26 DIAGNOSIS — R11.2 POST-OPERATIVE NAUSEA AND VOMITING: ICD-10-CM

## 2024-09-26 DIAGNOSIS — Z13.21 ENCOUNTER FOR VITAMIN DEFICIENCY SCREENING: ICD-10-CM

## 2024-09-26 DIAGNOSIS — G47.33 OSA (OBSTRUCTIVE SLEEP APNEA): ICD-10-CM

## 2024-09-26 PROCEDURE — 3008F BODY MASS INDEX DOCD: CPT | Performed by: SURGERY

## 2024-09-26 PROCEDURE — 99215 OFFICE O/P EST HI 40 MIN: CPT | Performed by: SURGERY

## 2024-09-26 PROCEDURE — 1036F TOBACCO NON-USER: CPT | Performed by: SURGERY

## 2024-09-26 PROCEDURE — 3080F DIAST BP >= 90 MM HG: CPT | Performed by: SURGERY

## 2024-09-26 PROCEDURE — 3077F SYST BP >= 140 MM HG: CPT | Performed by: SURGERY

## 2024-09-26 RX ORDER — OMEPRAZOLE 40 MG/1
40 CAPSULE, DELAYED RELEASE ORAL
Qty: 90 CAPSULE | Refills: 1 | Status: SHIPPED | OUTPATIENT
Start: 2024-09-26 | End: 2025-03-25

## 2024-09-26 RX ORDER — ONDANSETRON 4 MG/1
4 TABLET, ORALLY DISINTEGRATING ORAL EVERY 6 HOURS PRN
Qty: 60 TABLET | Refills: 1 | Status: SHIPPED | OUTPATIENT
Start: 2024-09-26

## 2024-09-26 RX ORDER — TRAMADOL HYDROCHLORIDE 50 MG/1
50 TABLET ORAL EVERY 6 HOURS PRN
Qty: 15 TABLET | Refills: 0 | Status: SHIPPED | OUTPATIENT
Start: 2024-09-26 | End: 2024-10-03

## 2024-10-01 RX ORDER — ERGOCALCIFEROL 1.25 MG/1
CAPSULE ORAL
COMMUNITY
Start: 2024-05-21 | End: 2024-10-02 | Stop reason: ALTCHOICE

## 2024-10-01 RX ORDER — METFORMIN HYDROCHLORIDE 500 MG/1
2 TABLET, EXTENDED RELEASE ORAL
COMMUNITY
Start: 2024-06-12 | End: 2024-10-02 | Stop reason: ALTCHOICE

## 2024-10-02 ENCOUNTER — PRE-ADMISSION TESTING (OUTPATIENT)
Dept: PREADMISSION TESTING | Facility: HOSPITAL | Age: 54
End: 2024-10-02
Payer: COMMERCIAL

## 2024-10-02 ENCOUNTER — HOSPITAL ENCOUNTER (OUTPATIENT)
Dept: RADIOLOGY | Facility: HOSPITAL | Age: 54
Discharge: HOME | End: 2024-10-02
Payer: COMMERCIAL

## 2024-10-02 VITALS
OXYGEN SATURATION: 95 % | BODY MASS INDEX: 44.64 KG/M2 | TEMPERATURE: 97 F | SYSTOLIC BLOOD PRESSURE: 156 MMHG | HEIGHT: 66 IN | HEART RATE: 77 BPM | WEIGHT: 277.78 LBS | DIASTOLIC BLOOD PRESSURE: 88 MMHG | RESPIRATION RATE: 16 BRPM

## 2024-10-02 DIAGNOSIS — R82.90 ABNORMAL URINALYSIS: ICD-10-CM

## 2024-10-02 DIAGNOSIS — Z98.84 BARIATRIC SURGERY STATUS: ICD-10-CM

## 2024-10-02 DIAGNOSIS — Z01.818 PREOPERATIVE CLEARANCE: ICD-10-CM

## 2024-10-02 DIAGNOSIS — R82.71 BACTERIA IN URINE: ICD-10-CM

## 2024-10-02 DIAGNOSIS — Z01.818 PRE-OP TESTING: Primary | ICD-10-CM

## 2024-10-02 LAB
ABO GROUP (TYPE) IN BLOOD: NORMAL
ABO GROUP (TYPE) IN BLOOD: NORMAL
ALBUMIN SERPL BCP-MCNC: 4.3 G/DL (ref 3.4–5)
ALP SERPL-CCNC: 92 U/L (ref 33–110)
ALT SERPL W P-5'-P-CCNC: 18 U/L (ref 7–45)
ANION GAP SERPL CALC-SCNC: 13 MMOL/L (ref 10–20)
ANTIBODY SCREEN: NORMAL
APPEARANCE UR: ABNORMAL
AST SERPL W P-5'-P-CCNC: 21 U/L (ref 9–39)
BACTERIA #/AREA URNS AUTO: ABNORMAL /HPF
BASOPHILS # BLD AUTO: 0.02 X10*3/UL (ref 0–0.1)
BASOPHILS NFR BLD AUTO: 0.4 %
BILIRUB SERPL-MCNC: 0.5 MG/DL (ref 0–1.2)
BILIRUB UR STRIP.AUTO-MCNC: NEGATIVE MG/DL
BUN SERPL-MCNC: 25 MG/DL (ref 6–23)
CALCIUM SERPL-MCNC: 9.8 MG/DL (ref 8.6–10.3)
CHLORIDE SERPL-SCNC: 105 MMOL/L (ref 98–107)
CO2 SERPL-SCNC: 27 MMOL/L (ref 21–32)
COLOR UR: YELLOW
CREAT SERPL-MCNC: 1.08 MG/DL (ref 0.5–1.05)
EGFRCR SERPLBLD CKD-EPI 2021: 62 ML/MIN/1.73M*2
EOSINOPHIL # BLD AUTO: 0.14 X10*3/UL (ref 0–0.7)
EOSINOPHIL NFR BLD AUTO: 2.8 %
ERYTHROCYTE [DISTWIDTH] IN BLOOD BY AUTOMATED COUNT: 16.1 % (ref 11.5–14.5)
GLUCOSE SERPL-MCNC: 94 MG/DL (ref 74–99)
GLUCOSE UR STRIP.AUTO-MCNC: NORMAL MG/DL
HCT VFR BLD AUTO: 33.2 % (ref 36–46)
HGB BLD-MCNC: 10.4 G/DL (ref 12–16)
HOLD SPECIMEN: NORMAL
HYALINE CASTS #/AREA URNS AUTO: ABNORMAL /LPF
IMM GRANULOCYTES # BLD AUTO: 0.01 X10*3/UL (ref 0–0.7)
IMM GRANULOCYTES NFR BLD AUTO: 0.2 % (ref 0–0.9)
INR PPP: 1 (ref 0.9–1.1)
KETONES UR STRIP.AUTO-MCNC: NEGATIVE MG/DL
LEUKOCYTE ESTERASE UR QL STRIP.AUTO: ABNORMAL
LYMPHOCYTES # BLD AUTO: 1.62 X10*3/UL (ref 1.2–4.8)
LYMPHOCYTES NFR BLD AUTO: 31.9 %
MCH RBC QN AUTO: 25.8 PG (ref 26–34)
MCHC RBC AUTO-ENTMCNC: 31.3 G/DL (ref 32–36)
MCV RBC AUTO: 82 FL (ref 80–100)
MONOCYTES # BLD AUTO: 0.36 X10*3/UL (ref 0.1–1)
MONOCYTES NFR BLD AUTO: 7.1 %
MUCOUS THREADS #/AREA URNS AUTO: ABNORMAL /LPF
NEUTROPHILS # BLD AUTO: 2.93 X10*3/UL (ref 1.2–7.7)
NEUTROPHILS NFR BLD AUTO: 57.6 %
NITRITE UR QL STRIP.AUTO: NEGATIVE
NRBC BLD-RTO: 0 /100 WBCS (ref 0–0)
PH UR STRIP.AUTO: 5.5 [PH]
PLATELET # BLD AUTO: 226 X10*3/UL (ref 150–450)
POTASSIUM SERPL-SCNC: 5.1 MMOL/L (ref 3.5–5.3)
PROT SERPL-MCNC: 7.1 G/DL (ref 6.4–8.2)
PROT UR STRIP.AUTO-MCNC: ABNORMAL MG/DL
PROTHROMBIN TIME: 11.2 SECONDS (ref 9.8–12.8)
RBC # BLD AUTO: 4.03 X10*6/UL (ref 4–5.2)
RBC # UR STRIP.AUTO: NEGATIVE /UL
RBC #/AREA URNS AUTO: ABNORMAL /HPF
RH FACTOR (ANTIGEN D): NORMAL
RH FACTOR (ANTIGEN D): NORMAL
SODIUM SERPL-SCNC: 140 MMOL/L (ref 136–145)
SP GR UR STRIP.AUTO: 1.02
SQUAMOUS #/AREA URNS AUTO: ABNORMAL /HPF
UROBILINOGEN UR STRIP.AUTO-MCNC: NORMAL MG/DL
WBC # BLD AUTO: 5.1 X10*3/UL (ref 4.4–11.3)
WBC #/AREA URNS AUTO: ABNORMAL /HPF

## 2024-10-02 PROCEDURE — 86901 BLOOD TYPING SEROLOGIC RH(D): CPT

## 2024-10-02 PROCEDURE — 80323 ALKALOIDS NOS: CPT

## 2024-10-02 PROCEDURE — 85610 PROTHROMBIN TIME: CPT

## 2024-10-02 PROCEDURE — 80053 COMPREHEN METABOLIC PANEL: CPT

## 2024-10-02 PROCEDURE — 93005 ELECTROCARDIOGRAM TRACING: CPT

## 2024-10-02 PROCEDURE — 71045 X-RAY EXAM CHEST 1 VIEW: CPT

## 2024-10-02 PROCEDURE — 36415 COLL VENOUS BLD VENIPUNCTURE: CPT

## 2024-10-02 PROCEDURE — 87086 URINE CULTURE/COLONY COUNT: CPT | Mod: PARLAB

## 2024-10-02 PROCEDURE — 85025 COMPLETE CBC W/AUTO DIFF WBC: CPT

## 2024-10-02 PROCEDURE — 93010 ELECTROCARDIOGRAM REPORT: CPT | Performed by: STUDENT IN AN ORGANIZED HEALTH CARE EDUCATION/TRAINING PROGRAM

## 2024-10-02 PROCEDURE — 81001 URINALYSIS AUTO W/SCOPE: CPT

## 2024-10-02 RX ORDER — CIPROFLOXACIN 500 MG/1
500 TABLET ORAL 2 TIMES DAILY
Qty: 10 TABLET | Refills: 0 | Status: SHIPPED | OUTPATIENT
Start: 2024-10-02 | End: 2024-10-07

## 2024-10-02 ASSESSMENT — DUKE ACTIVITY SCORE INDEX (DASI)
TOTAL_SCORE: 52.2
CAN YOU DO MODERATE WORK AROUND THE HOUSE LIKE VACUUMING, SWEEPING FLOORS OR CARRYING GROCERIES: YES
CAN YOU WALK A BLOCK OR TWO ON LEVEL GROUND: YES
CAN YOU TAKE CARE OF YOURSELF (EAT, DRESS, BATHE, OR USE TOILET): YES
CAN YOU DO HEAVY WORK AROUND THE HOUSE LIKE SCRUBBING FLOORS OR LIFTING AND MOVING HEAVY FURNITURE: YES
CAN YOU RUN A SHORT DISTANCE: YES
CAN YOU CLIMB A FLIGHT OF STAIRS OR WALK UP A HILL: YES
DASI METS SCORE: 9.2
CAN YOU HAVE SEXUAL RELATIONS: YES
CAN YOU WALK INDOORS, SUCH AS AROUND YOUR HOUSE: YES
CAN YOU PARTICIPATE IN STRENOUS SPORTS LIKE SWIMMING, SINGLES TENNIS, FOOTBALL, BASKETBALL, OR SKIING: YES
CAN YOU PARTICIPATE IN MODERATE RECREATIONAL ACTIVITIES LIKE GOLF, BOWLING, DANCING, DOUBLES TENNIS OR THROWING A BASEBALL OR FOOTBALL: NO
CAN YOU DO YARD WORK LIKE RAKING LEAVES, WEEDING OR PUSHING A MOWER: YES
CAN YOU DO LIGHT WORK AROUND THE HOUSE LIKE DUSTING OR WASHING DISHES: YES

## 2024-10-02 NOTE — PREPROCEDURE INSTRUCTIONS
Medication List            Accurate as of October 2, 2024 10:28 AM. Always use your most recent med list.                allopurinol 300 mg tablet  Commonly known as: Zyloprim  TAKE 1 TABLET(300 MG) BY MOUTH EVERY DAY  Medication Adjustments for Surgery: Take/Use as prescribed     COVID-19 At-Home Test kit  Generic drug: COVID-19 antigen test  Use test as instructed  Additional Medication Adjustments for Surgery: Other (Comment)     diclofenac 75 mg EC tablet  Commonly known as: Voltaren  Take 1 tablet (75 mg) by mouth 2 times a day.  Additional Medication Adjustments for Surgery: Take last dose 7 days before surgery     meloxicam 15 mg tablet  Commonly known as: Mobic  Take 1 tablet (15 mg) by mouth once daily.  Additional Medication Adjustments for Surgery: Take last dose 7 days before surgery     metoprolol succinate  mg 24 hr tablet  Commonly known as: Toprol-XL  Take 1 tablet (100 mg) by mouth once daily.  Medication Adjustments for Surgery: Take/Use as prescribed     omeprazole 40 mg DR capsule  Commonly known as: PriLOSEC  Take 1 capsule (40 mg) by mouth once daily in the morning. Take before meals. Do not crush or chew. Open capsule, sprinkle beads on SF jello, pudding or applesauce.  Medication Adjustments for Surgery: Take/Use as prescribed  Additional Medication Adjustments for Surgery: Other (Comment)     ondansetron ODT 4 mg disintegrating tablet  Commonly known as: Zofran-ODT  Dissolve 1 tablet (4 mg) by mouth every 6 hours if needed for nausea or vomiting.  Medication Adjustments for Surgery: Take/Use as prescribed  Additional Medication Adjustments for Surgery: Other (Comment)     pantoprazole 40 mg EC tablet  Commonly known as: ProtoNix  Take 1 tablet (40 mg) by mouth once daily.  Medication Adjustments for Surgery: Take/Use as prescribed     telmisartan 80 mg tablet  Commonly known as: MIcarDIS  Take 1 tablet (80 mg) by mouth once daily.  Medication Adjustments for Surgery: Take last dose  1 day (24 hours) before surgery  Additional Medication Adjustments for Surgery: Other (Comment)  Notes to patient: Do not take night before or day of surgery - (Do not take 24 hours before surgery)     terazosin 10 mg capsule  Commonly known as: Hytrin  Take 1 capsule (10 mg) by mouth once daily at bedtime.  Medication Adjustments for Surgery: Take/Use as prescribed     traMADol 50 mg tablet  Commonly known as: Ultram  Take 1 tablet (50 mg) by mouth every 6 hours if needed for severe pain (7 - 10) for up to 7 days.  Medication Adjustments for Surgery: Take/Use as prescribed          One of our staff members will call you one business day before your surgery between 12-4pm to let you know the time to arrive at the hospital. If you have not received a phone call by FRIDAY 2pm call [phone].     When you arrive at the hospital, go to Registration on the ground floor.   You will need a responsible adult to drive you home.     Prior to surgery date:  One (1) week prior to surgery STOP:  -Stop aspirin products. Do not take NSAIDS/ Ibuprofen, Aleve, Motrin. Okay to take Tylenol or Acetaminophen. Do not take vitamins, supplements, herbals, diet pills.   -Stop these medications:   -No alcohol for 24 hours prior to surgery.  -No smoking 24 hours prior. No Marijuana, CBD oil, or Vaping 48 hours prior to surgery.  -Clear liquid diet day before surgery    Day of Surgery:  -Nothing to eat or drink after midnight. This includes food of any kind (including hard candy, cough drops, mints and gum, coffee).   -No acrylic nails or nail polish on at least one fingernail; no polish on toes for foot surgery.   -No body piercing or jewelry.   -Shower as directed. No deodorant, lotion, power, oils, perfume, make-up.   -Wear loose comfortable clothing to accommodate your bandages.        NPO Instructions:    Do not eat any food after midnight the night before your surgery/procedure.

## 2024-10-03 LAB — BACTERIA UR CULT: NORMAL

## 2024-10-04 LAB
ATRIAL RATE: 74 BPM
P AXIS: 14 DEGREES
P OFFSET: 189 MS
P ONSET: 136 MS
PR INTERVAL: 150 MS
Q ONSET: 211 MS
QRS COUNT: 12 BEATS
QRS DURATION: 96 MS
QT INTERVAL: 394 MS
QTC CALCULATION(BAZETT): 437 MS
QTC FREDERICIA: 422 MS
R AXIS: -1 DEGREES
T AXIS: 4 DEGREES
T OFFSET: 408 MS
VENTRICULAR RATE: 74 BPM

## 2024-10-07 LAB
ANABASINE UR-MCNC: <5 NG/ML
COTININE UR-MCNC: <15 NG/ML
NICOTINE UR-MCNC: <15 NG/ML
TRANS-3-OH-COTININE UR-MCNC: <50 NG/ML

## 2024-10-11 PROBLEM — E66.01 MORBID (SEVERE) OBESITY DUE TO EXCESS CALORIES (MULTI): Status: ACTIVE | Noted: 2024-10-11

## 2024-10-14 ENCOUNTER — HOSPITAL ENCOUNTER (INPATIENT)
Facility: HOSPITAL | Age: 54
LOS: 2 days | Discharge: HOME | DRG: 621 | End: 2024-10-16
Attending: SURGERY | Admitting: STUDENT IN AN ORGANIZED HEALTH CARE EDUCATION/TRAINING PROGRAM
Payer: COMMERCIAL

## 2024-10-14 ENCOUNTER — ANESTHESIA EVENT (OUTPATIENT)
Dept: OPERATING ROOM | Facility: HOSPITAL | Age: 54
DRG: 621 | End: 2024-10-14
Payer: COMMERCIAL

## 2024-10-14 ENCOUNTER — ANESTHESIA (OUTPATIENT)
Dept: OPERATING ROOM | Facility: HOSPITAL | Age: 54
DRG: 621 | End: 2024-10-14
Payer: COMMERCIAL

## 2024-10-14 DIAGNOSIS — E66.01 MORBID OBESITY DUE TO EXCESS CALORIES (MULTI): ICD-10-CM

## 2024-10-14 DIAGNOSIS — E66.01 MORBID (SEVERE) OBESITY DUE TO EXCESS CALORIES (MULTI): Primary | ICD-10-CM

## 2024-10-14 DIAGNOSIS — I10 HYPERTENSION, UNSPECIFIED TYPE: ICD-10-CM

## 2024-10-14 LAB — GLUCOSE BLD MANUAL STRIP-MCNC: 198 MG/DL (ref 74–99)

## 2024-10-14 PROCEDURE — 2500000004 HC RX 250 GENERAL PHARMACY W/ HCPCS (ALT 636 FOR OP/ED): Performed by: ANESTHESIOLOGY

## 2024-10-14 PROCEDURE — 3700000001 HC GENERAL ANESTHESIA TIME - INITIAL BASE CHARGE: Performed by: SURGERY

## 2024-10-14 PROCEDURE — 2500000002 HC RX 250 W HCPCS SELF ADMINISTERED DRUGS (ALT 637 FOR MEDICARE OP, ALT 636 FOR OP/ED): Performed by: STUDENT IN AN ORGANIZED HEALTH CARE EDUCATION/TRAINING PROGRAM

## 2024-10-14 PROCEDURE — 3700000002 HC GENERAL ANESTHESIA TIME - EACH INCREMENTAL 1 MINUTE: Performed by: SURGERY

## 2024-10-14 PROCEDURE — 3600000009 HC OR TIME - EACH INCREMENTAL 1 MINUTE - PROCEDURE LEVEL FOUR: Performed by: SURGERY

## 2024-10-14 PROCEDURE — 82947 ASSAY GLUCOSE BLOOD QUANT: CPT

## 2024-10-14 PROCEDURE — A43775 PR LAP, GAST RESTRICT PROC, LONGITUDINAL GASTRECTOMY: Performed by: ANESTHESIOLOGY

## 2024-10-14 PROCEDURE — RXMED WILLOW AMBULATORY MEDICATION CHARGE

## 2024-10-14 PROCEDURE — A43775 PR LAP, GAST RESTRICT PROC, LONGITUDINAL GASTRECTOMY: Performed by: ANESTHESIOLOGIST ASSISTANT

## 2024-10-14 PROCEDURE — 3600000004 HC OR TIME - INITIAL BASE CHARGE - PROCEDURE LEVEL FOUR: Performed by: SURGERY

## 2024-10-14 PROCEDURE — 2500000004 HC RX 250 GENERAL PHARMACY W/ HCPCS (ALT 636 FOR OP/ED): Performed by: ANESTHESIOLOGIST ASSISTANT

## 2024-10-14 PROCEDURE — 2500000005 HC RX 250 GENERAL PHARMACY W/O HCPCS: Performed by: ANESTHESIOLOGIST ASSISTANT

## 2024-10-14 PROCEDURE — 88307 TISSUE EXAM BY PATHOLOGIST: CPT | Mod: TC,PARLAB | Performed by: STUDENT IN AN ORGANIZED HEALTH CARE EDUCATION/TRAINING PROGRAM

## 2024-10-14 PROCEDURE — 43775 LAP SLEEVE GASTRECTOMY: CPT | Performed by: STUDENT IN AN ORGANIZED HEALTH CARE EDUCATION/TRAINING PROGRAM

## 2024-10-14 PROCEDURE — 7100000002 HC RECOVERY ROOM TIME - EACH INCREMENTAL 1 MINUTE: Performed by: SURGERY

## 2024-10-14 PROCEDURE — 1100000001 HC PRIVATE ROOM DAILY

## 2024-10-14 PROCEDURE — 7100000001 HC RECOVERY ROOM TIME - INITIAL BASE CHARGE: Performed by: SURGERY

## 2024-10-14 PROCEDURE — 2500000004 HC RX 250 GENERAL PHARMACY W/ HCPCS (ALT 636 FOR OP/ED): Performed by: SURGERY

## 2024-10-14 PROCEDURE — 2500000004 HC RX 250 GENERAL PHARMACY W/ HCPCS (ALT 636 FOR OP/ED): Performed by: STUDENT IN AN ORGANIZED HEALTH CARE EDUCATION/TRAINING PROGRAM

## 2024-10-14 PROCEDURE — 2780000003 HC OR 278 NO HCPCS: Performed by: SURGERY

## 2024-10-14 PROCEDURE — 0DB64Z3 EXCISION OF STOMACH, PERCUTANEOUS ENDOSCOPIC APPROACH, VERTICAL: ICD-10-PCS | Performed by: SURGERY

## 2024-10-14 PROCEDURE — 43775 LAP SLEEVE GASTRECTOMY: CPT | Performed by: SURGERY

## 2024-10-14 PROCEDURE — 2500000001 HC RX 250 WO HCPCS SELF ADMINISTERED DRUGS (ALT 637 FOR MEDICARE OP): Performed by: STUDENT IN AN ORGANIZED HEALTH CARE EDUCATION/TRAINING PROGRAM

## 2024-10-14 PROCEDURE — 2720000007 HC OR 272 NO HCPCS: Performed by: SURGERY

## 2024-10-14 RX ORDER — NORETHINDRONE AND ETHINYL ESTRADIOL 0.5-0.035
KIT ORAL AS NEEDED
Status: DISCONTINUED | OUTPATIENT
Start: 2024-10-14 | End: 2024-10-14

## 2024-10-14 RX ORDER — ROCURONIUM BROMIDE 10 MG/ML
INJECTION, SOLUTION INTRAVENOUS AS NEEDED
Status: DISCONTINUED | OUTPATIENT
Start: 2024-10-14 | End: 2024-10-14

## 2024-10-14 RX ORDER — SCOLOPAMINE TRANSDERMAL SYSTEM 1 MG/1
1 PATCH, EXTENDED RELEASE TRANSDERMAL ONCE
Status: DISCONTINUED | OUTPATIENT
Start: 2024-10-14 | End: 2024-10-16 | Stop reason: HOSPADM

## 2024-10-14 RX ORDER — SIMETHICONE 80 MG
80 TABLET,CHEWABLE ORAL EVERY 4 HOURS PRN
Status: DISCONTINUED | OUTPATIENT
Start: 2024-10-14 | End: 2024-10-15

## 2024-10-14 RX ORDER — ESMOLOL HYDROCHLORIDE 10 MG/ML
INJECTION INTRAVENOUS AS NEEDED
Status: DISCONTINUED | OUTPATIENT
Start: 2024-10-14 | End: 2024-10-14

## 2024-10-14 RX ORDER — VALSARTAN 80 MG/1
160 TABLET ORAL DAILY
Status: DISCONTINUED | OUTPATIENT
Start: 2024-10-14 | End: 2024-10-16 | Stop reason: HOSPADM

## 2024-10-14 RX ORDER — SODIUM CHLORIDE, SODIUM LACTATE, POTASSIUM CHLORIDE, CALCIUM CHLORIDE 600; 310; 30; 20 MG/100ML; MG/100ML; MG/100ML; MG/100ML
150 INJECTION, SOLUTION INTRAVENOUS CONTINUOUS
Status: ACTIVE | OUTPATIENT
Start: 2024-10-14 | End: 2024-10-15

## 2024-10-14 RX ORDER — FENTANYL CITRATE 50 UG/ML
INJECTION, SOLUTION INTRAMUSCULAR; INTRAVENOUS AS NEEDED
Status: DISCONTINUED | OUTPATIENT
Start: 2024-10-14 | End: 2024-10-14

## 2024-10-14 RX ORDER — DEXAMETHASONE SODIUM PHOSPHATE 10 MG/ML
6 INJECTION INTRAMUSCULAR; INTRAVENOUS ONCE
Status: DISCONTINUED | OUTPATIENT
Start: 2024-10-14 | End: 2024-10-14 | Stop reason: HOSPADM

## 2024-10-14 RX ORDER — ACETAMINOPHEN 10 MG/ML
1000 INJECTION, SOLUTION INTRAVENOUS EVERY 6 HOURS
Status: COMPLETED | OUTPATIENT
Start: 2024-10-14 | End: 2024-10-15

## 2024-10-14 RX ORDER — ONDANSETRON HYDROCHLORIDE 2 MG/ML
INJECTION, SOLUTION INTRAVENOUS AS NEEDED
Status: DISCONTINUED | OUTPATIENT
Start: 2024-10-14 | End: 2024-10-14

## 2024-10-14 RX ORDER — FENTANYL CITRATE 50 UG/ML
25 INJECTION, SOLUTION INTRAMUSCULAR; INTRAVENOUS EVERY 5 MIN PRN
Status: DISCONTINUED | OUTPATIENT
Start: 2024-10-14 | End: 2024-10-14 | Stop reason: HOSPADM

## 2024-10-14 RX ORDER — LIDOCAINE HYDROCHLORIDE 40 MG/ML
INJECTION, SOLUTION RETROBULBAR AS NEEDED
Status: DISCONTINUED | OUTPATIENT
Start: 2024-10-14 | End: 2024-10-14

## 2024-10-14 RX ORDER — ACETAMINOPHEN 325 MG/1
650 TABLET ORAL EVERY 4 HOURS PRN
Status: DISCONTINUED | OUTPATIENT
Start: 2024-10-14 | End: 2024-10-14 | Stop reason: HOSPADM

## 2024-10-14 RX ORDER — HEPARIN SODIUM 5000 [USP'U]/ML
5000 INJECTION, SOLUTION INTRAVENOUS; SUBCUTANEOUS EVERY 8 HOURS
Status: DISCONTINUED | OUTPATIENT
Start: 2024-10-15 | End: 2024-10-16 | Stop reason: HOSPADM

## 2024-10-14 RX ORDER — MEPERIDINE HYDROCHLORIDE 25 MG/ML
INJECTION INTRAMUSCULAR; INTRAVENOUS; SUBCUTANEOUS AS NEEDED
Status: DISCONTINUED | OUTPATIENT
Start: 2024-10-14 | End: 2024-10-14

## 2024-10-14 RX ORDER — METOCLOPRAMIDE 10 MG/1
10 TABLET ORAL EVERY 6 HOURS PRN
Status: DISCONTINUED | OUTPATIENT
Start: 2024-10-14 | End: 2024-10-16 | Stop reason: HOSPADM

## 2024-10-14 RX ORDER — PANTOPRAZOLE SODIUM 40 MG/1
40 TABLET, DELAYED RELEASE ORAL
Status: DISCONTINUED | OUTPATIENT
Start: 2024-10-15 | End: 2024-10-16 | Stop reason: HOSPADM

## 2024-10-14 RX ORDER — NALOXONE HYDROCHLORIDE 1 MG/ML
0.2 INJECTION INTRAMUSCULAR; INTRAVENOUS; SUBCUTANEOUS EVERY 5 MIN PRN
Status: DISCONTINUED | OUTPATIENT
Start: 2024-10-14 | End: 2024-10-16 | Stop reason: HOSPADM

## 2024-10-14 RX ORDER — METOCLOPRAMIDE HYDROCHLORIDE 5 MG/ML
10 INJECTION INTRAMUSCULAR; INTRAVENOUS EVERY 6 HOURS PRN
Status: DISCONTINUED | OUTPATIENT
Start: 2024-10-14 | End: 2024-10-16 | Stop reason: HOSPADM

## 2024-10-14 RX ORDER — PANTOPRAZOLE SODIUM 40 MG/10ML
40 INJECTION, POWDER, LYOPHILIZED, FOR SOLUTION INTRAVENOUS
Status: DISCONTINUED | OUTPATIENT
Start: 2024-10-15 | End: 2024-10-16 | Stop reason: HOSPADM

## 2024-10-14 RX ORDER — METOPROLOL TARTRATE 1 MG/ML
5 INJECTION, SOLUTION INTRAVENOUS ONCE
Status: COMPLETED | OUTPATIENT
Start: 2024-10-14 | End: 2024-10-14

## 2024-10-14 RX ORDER — PROMETHAZINE HYDROCHLORIDE 25 MG/ML
INJECTION, SOLUTION INTRAMUSCULAR; INTRAVENOUS AS NEEDED
Status: DISCONTINUED | OUTPATIENT
Start: 2024-10-14 | End: 2024-10-14

## 2024-10-14 RX ORDER — OXYCODONE HCL 5 MG/5 ML
5 SOLUTION, ORAL ORAL EVERY 6 HOURS PRN
Status: DISCONTINUED | OUTPATIENT
Start: 2024-10-14 | End: 2024-10-16 | Stop reason: HOSPADM

## 2024-10-14 RX ORDER — ESOMEPRAZOLE MAGNESIUM 40 MG/1
40 GRANULE, DELAYED RELEASE ORAL
Status: DISCONTINUED | OUTPATIENT
Start: 2024-10-15 | End: 2024-10-16 | Stop reason: HOSPADM

## 2024-10-14 RX ORDER — ALBUTEROL SULFATE 0.83 MG/ML
2.5 SOLUTION RESPIRATORY (INHALATION) ONCE AS NEEDED
Status: DISCONTINUED | OUTPATIENT
Start: 2024-10-14 | End: 2024-10-14 | Stop reason: HOSPADM

## 2024-10-14 RX ORDER — LIDOCAINE HCL/PF 100 MG/5ML
SYRINGE (ML) INTRAVENOUS AS NEEDED
Status: DISCONTINUED | OUTPATIENT
Start: 2024-10-14 | End: 2024-10-14

## 2024-10-14 RX ORDER — GABAPENTIN 300 MG/1
600 CAPSULE ORAL ONCE
Status: COMPLETED | OUTPATIENT
Start: 2024-10-14 | End: 2024-10-14

## 2024-10-14 RX ORDER — ALLOPURINOL 300 MG/1
300 TABLET ORAL DAILY
Status: DISCONTINUED | OUTPATIENT
Start: 2024-10-14 | End: 2024-10-16 | Stop reason: HOSPADM

## 2024-10-14 RX ORDER — ONDANSETRON 4 MG/1
4 TABLET, ORALLY DISINTEGRATING ORAL EVERY 8 HOURS PRN
Status: DISCONTINUED | OUTPATIENT
Start: 2024-10-14 | End: 2024-10-16 | Stop reason: HOSPADM

## 2024-10-14 RX ORDER — MEPERIDINE HYDROCHLORIDE 25 MG/ML
12.5 INJECTION INTRAMUSCULAR; INTRAVENOUS; SUBCUTANEOUS EVERY 10 MIN PRN
Status: DISCONTINUED | OUTPATIENT
Start: 2024-10-14 | End: 2024-10-14 | Stop reason: HOSPADM

## 2024-10-14 RX ORDER — ONDANSETRON HYDROCHLORIDE 2 MG/ML
4 INJECTION, SOLUTION INTRAVENOUS ONCE AS NEEDED
Status: DISCONTINUED | OUTPATIENT
Start: 2024-10-14 | End: 2024-10-14 | Stop reason: HOSPADM

## 2024-10-14 RX ORDER — ONDANSETRON HYDROCHLORIDE 2 MG/ML
4 INJECTION, SOLUTION INTRAVENOUS EVERY 8 HOURS PRN
Status: DISCONTINUED | OUTPATIENT
Start: 2024-10-14 | End: 2024-10-16 | Stop reason: HOSPADM

## 2024-10-14 RX ORDER — PHENYLEPHRINE HCL IN 0.9% NACL 1 MG/10 ML
SYRINGE (ML) INTRAVENOUS AS NEEDED
Status: DISCONTINUED | OUTPATIENT
Start: 2024-10-14 | End: 2024-10-14

## 2024-10-14 RX ORDER — METOPROLOL TARTRATE 1 MG/ML
5 INJECTION, SOLUTION INTRAVENOUS EVERY 6 HOURS
Status: DISCONTINUED | OUTPATIENT
Start: 2024-10-14 | End: 2024-10-15

## 2024-10-14 RX ORDER — HEPARIN SODIUM 5000 [USP'U]/ML
5000 INJECTION, SOLUTION INTRAVENOUS; SUBCUTANEOUS ONCE
Status: COMPLETED | OUTPATIENT
Start: 2024-10-14 | End: 2024-10-14

## 2024-10-14 RX ORDER — PROPOFOL 10 MG/ML
INJECTION, EMULSION INTRAVENOUS AS NEEDED
Status: DISCONTINUED | OUTPATIENT
Start: 2024-10-14 | End: 2024-10-14

## 2024-10-14 RX ORDER — OXYCODONE HCL 5 MG/5 ML
10 SOLUTION, ORAL ORAL EVERY 4 HOURS PRN
Status: DISCONTINUED | OUTPATIENT
Start: 2024-10-14 | End: 2024-10-16 | Stop reason: HOSPADM

## 2024-10-14 RX ORDER — BUPIVACAINE HYDROCHLORIDE 2.5 MG/ML
INJECTION, SOLUTION INFILTRATION; PERINEURAL AS NEEDED
Status: DISCONTINUED | OUTPATIENT
Start: 2024-10-14 | End: 2024-10-14 | Stop reason: HOSPADM

## 2024-10-14 RX ORDER — CEFAZOLIN SODIUM 2 G/100ML
2 INJECTION, SOLUTION INTRAVENOUS EVERY 8 HOURS
Status: COMPLETED | OUTPATIENT
Start: 2024-10-14 | End: 2024-10-15

## 2024-10-14 SDOH — ECONOMIC STABILITY: INCOME INSECURITY: IN THE PAST 12 MONTHS HAS THE ELECTRIC, GAS, OIL, OR WATER COMPANY THREATENED TO SHUT OFF SERVICES IN YOUR HOME?: NO

## 2024-10-14 SDOH — SOCIAL STABILITY: SOCIAL INSECURITY: HAVE YOU HAD THOUGHTS OF HARMING ANYONE ELSE?: NO

## 2024-10-14 SDOH — SOCIAL STABILITY: SOCIAL INSECURITY: ABUSE: ADULT

## 2024-10-14 SDOH — SOCIAL STABILITY: SOCIAL INSECURITY: WITHIN THE LAST YEAR, HAVE YOU BEEN AFRAID OF YOUR PARTNER OR EX-PARTNER?: NO

## 2024-10-14 SDOH — ECONOMIC STABILITY: FOOD INSECURITY: WITHIN THE PAST 12 MONTHS, THE FOOD YOU BOUGHT JUST DIDN'T LAST AND YOU DIDN'T HAVE MONEY TO GET MORE.: NEVER TRUE

## 2024-10-14 SDOH — SOCIAL STABILITY: SOCIAL INSECURITY: HAS ANYONE EVER THREATENED TO HURT YOUR FAMILY OR YOUR PETS?: NO

## 2024-10-14 SDOH — SOCIAL STABILITY: SOCIAL INSECURITY: WITHIN THE LAST YEAR, HAVE YOU BEEN HUMILIATED OR EMOTIONALLY ABUSED IN OTHER WAYS BY YOUR PARTNER OR EX-PARTNER?: NO

## 2024-10-14 SDOH — ECONOMIC STABILITY: FOOD INSECURITY: WITHIN THE PAST 12 MONTHS, YOU WORRIED THAT YOUR FOOD WOULD RUN OUT BEFORE YOU GOT THE MONEY TO BUY MORE.: NEVER TRUE

## 2024-10-14 SDOH — SOCIAL STABILITY: SOCIAL INSECURITY: DO YOU FEEL ANYONE HAS EXPLOITED OR TAKEN ADVANTAGE OF YOU FINANCIALLY OR OF YOUR PERSONAL PROPERTY?: NO

## 2024-10-14 SDOH — SOCIAL STABILITY: SOCIAL INSECURITY
WITHIN THE LAST YEAR, HAVE YOU BEEN KICKED, HIT, SLAPPED, OR OTHERWISE PHYSICALLY HURT BY YOUR PARTNER OR EX-PARTNER?: NO

## 2024-10-14 SDOH — SOCIAL STABILITY: SOCIAL INSECURITY: ARE YOU OR HAVE YOU BEEN THREATENED OR ABUSED PHYSICALLY, EMOTIONALLY, OR SEXUALLY BY ANYONE?: NO

## 2024-10-14 SDOH — SOCIAL STABILITY: SOCIAL INSECURITY
WITHIN THE LAST YEAR, HAVE YOU BEEN RAPED OR FORCED TO HAVE ANY KIND OF SEXUAL ACTIVITY BY YOUR PARTNER OR EX-PARTNER?: NO

## 2024-10-14 SDOH — SOCIAL STABILITY: SOCIAL INSECURITY: ARE THERE ANY APPARENT SIGNS OF INJURIES/BEHAVIORS THAT COULD BE RELATED TO ABUSE/NEGLECT?: NO

## 2024-10-14 SDOH — SOCIAL STABILITY: SOCIAL INSECURITY: DOES ANYONE TRY TO KEEP YOU FROM HAVING/CONTACTING OTHER FRIENDS OR DOING THINGS OUTSIDE YOUR HOME?: NO

## 2024-10-14 SDOH — SOCIAL STABILITY: SOCIAL INSECURITY: DO YOU FEEL UNSAFE GOING BACK TO THE PLACE WHERE YOU ARE LIVING?: NO

## 2024-10-14 SDOH — SOCIAL STABILITY: SOCIAL INSECURITY: HAVE YOU HAD ANY THOUGHTS OF HARMING ANYONE ELSE?: NO

## 2024-10-14 SDOH — HEALTH STABILITY: MENTAL HEALTH: CURRENT SMOKER: 0

## 2024-10-14 SDOH — SOCIAL STABILITY: SOCIAL INSECURITY: WERE YOU ABLE TO COMPLETE ALL THE BEHAVIORAL HEALTH SCREENINGS?: YES

## 2024-10-14 ASSESSMENT — ACTIVITIES OF DAILY LIVING (ADL)
FEEDING YOURSELF: INDEPENDENT
DRESSING YOURSELF: NEEDS ASSISTANCE
WALKS IN HOME: INDEPENDENT
LACK_OF_TRANSPORTATION: NO
TOILETING: INDEPENDENT
HEARING - LEFT EAR: FUNCTIONAL
GROOMING: NEEDS ASSISTANCE
ADEQUATE_TO_COMPLETE_ADL: YES
HEARING - RIGHT EAR: FUNCTIONAL
BATHING: INDEPENDENT
PATIENT'S MEMORY ADEQUATE TO SAFELY COMPLETE DAILY ACTIVITIES?: YES
JUDGMENT_ADEQUATE_SAFELY_COMPLETE_DAILY_ACTIVITIES: YES

## 2024-10-14 ASSESSMENT — PAIN SCALES - GENERAL
PAINLEVEL_OUTOF10: 0 - NO PAIN
PAINLEVEL_OUTOF10: 1
PAINLEVEL_OUTOF10: 0 - NO PAIN

## 2024-10-14 ASSESSMENT — COGNITIVE AND FUNCTIONAL STATUS - GENERAL
MOBILITY SCORE: 18
TOILETING: A LITTLE
STANDING UP FROM CHAIR USING ARMS: A LITTLE
WALKING IN HOSPITAL ROOM: A LITTLE
HELP NEEDED FOR BATHING: A LITTLE
TURNING FROM BACK TO SIDE WHILE IN FLAT BAD: A LITTLE
PATIENT BASELINE BEDBOUND: NO
MOVING FROM LYING ON BACK TO SITTING ON SIDE OF FLAT BED WITH BEDRAILS: A LITTLE
DRESSING REGULAR UPPER BODY CLOTHING: A LITTLE
MOVING TO AND FROM BED TO CHAIR: A LITTLE
DAILY ACTIVITIY SCORE: 20
DRESSING REGULAR LOWER BODY CLOTHING: A LITTLE
CLIMB 3 TO 5 STEPS WITH RAILING: A LITTLE

## 2024-10-14 ASSESSMENT — LIFESTYLE VARIABLES
HOW OFTEN DO YOU HAVE A DRINK CONTAINING ALCOHOL: NEVER
PRESCIPTION_ABUSE_PAST_12_MONTHS: NO
SUBSTANCE_ABUSE_PAST_12_MONTHS: NO
AUDIT-C TOTAL SCORE: 0
HOW OFTEN DO YOU HAVE 6 OR MORE DRINKS ON ONE OCCASION: NEVER
SKIP TO QUESTIONS 9-10: 1
HOW MANY STANDARD DRINKS CONTAINING ALCOHOL DO YOU HAVE ON A TYPICAL DAY: PATIENT DOES NOT DRINK
AUDIT-C TOTAL SCORE: 0

## 2024-10-14 ASSESSMENT — PAIN - FUNCTIONAL ASSESSMENT
PAIN_FUNCTIONAL_ASSESSMENT: 0-10

## 2024-10-14 ASSESSMENT — PAIN DESCRIPTION - DESCRIPTORS: DESCRIPTORS: ACHING

## 2024-10-14 ASSESSMENT — PATIENT HEALTH QUESTIONNAIRE - PHQ9
1. LITTLE INTEREST OR PLEASURE IN DOING THINGS: NOT AT ALL
SUM OF ALL RESPONSES TO PHQ9 QUESTIONS 1 & 2: 0
2. FEELING DOWN, DEPRESSED OR HOPELESS: NOT AT ALL

## 2024-10-14 NOTE — ANESTHESIA PREPROCEDURE EVALUATION
Patient: Mariana Aguirre    Procedure Information       Date/Time: 10/14/24 0730    Procedure: LAPAROSCOPIC GASTRIC SLEEVE    Location: [facility] / [facility]    Surgeons: Olga Cervantes MD            Relevant Problems   Anesthesia (within normal limits)      Cardiac   (+) Hypertension      Pulmonary   (+) LIEN (obstructive sleep apnea)      GI   (+) GERD (gastroesophageal reflux disease)      Endocrine   (+) Morbid (severe) obesity due to excess calories (Multi)   (+) Morbid obesity due to excess calories (Multi)      Hematology   (+) Anemia      Musculoskeletal   (+) Right knee DJD      Skin   (+) Rash of back       Clinical information reviewed:    Allergies  Meds               NPO Detail:  NPO/Void Status  Carbohydrate Drink Given Prior to Surgery? : Y  Date of Last Liquid: 10/14/24  Time of Last Liquid: 0600  Date of Last Solid: 10/13/24  Time of Last Solid: 1600  Last Intake Type: Clear fluids  Time of Last Void: 0648         Physical Exam    Airway  Mallampati: III  TM distance: >3 FB  Neck ROM: full     Cardiovascular - normal exam     Dental - normal exam     Pulmonary - normal exam     Abdominal - normal exam             Anesthesia Plan    History of general anesthesia?: yes  History of complications of general anesthesia?: no    ASA 3     general     The patient is not a current smoker.    intravenous induction   Postoperative administration of opioids is intended.  Trial extubation is planned.  Anesthetic plan and risks discussed with patient.  Use of blood products discussed with patient who consented to blood products.    Plan discussed with CRNA.

## 2024-10-14 NOTE — CARE PLAN
Problem: Pain - Adult  Goal: Verbalizes/displays adequate comfort level or baseline comfort level  Outcome: Progressing  Flowsheets (Taken 10/14/2024 1052)  Verbalizes/displays adequate comfort level or baseline comfort level: Encourage patient to monitor pain and request assistance

## 2024-10-14 NOTE — ANESTHESIA PROCEDURE NOTES
Airway  Date/Time: 10/14/2024 7:39 AM  Urgency: elective    Airway not difficult    Staffing  Performed: ROBERT   Authorized by: Alan East MD    Performed by: ROBERT Bey  Patient location during procedure: OR    Indications and Patient Condition  Indications for airway management: anesthesia and airway protection  Spontaneous ventilation: present  Sedation level: deep  Preoxygenated: yes  MILS maintained throughout  Mask difficulty assessment: 0 - not attempted    Final Airway Details  Final airway type: endotracheal airway      Successful airway: ETT  Cuffed: yes   Successful intubation technique: direct laryngoscopy  Facilitating devices/methods: cricoid pressure  Endotracheal tube insertion site: oral  Blade: Virgilio  Blade size: #4  ETT size (mm): 7.0  Cormack-Lehane Classification: grade IIa - partial view of glottis  Placement verified by: chest auscultation and capnometry   Measured from: teeth  ETT to teeth (cm): 19  Number of attempts at approach: 1  Number of other approaches attempted: 0

## 2024-10-14 NOTE — ANESTHESIA POSTPROCEDURE EVALUATION
Patient: Mariana Aguirre    Procedure Summary       Date: 10/14/24 Room / Location: [facility] / [facility]    Anesthesia Start: 0729 Anesthesia Stop: 0916    Procedure: LAPAROSCOPIC GASTRIC SLEEVE / EGD / TAP BLOCK Diagnosis:       Morbid obesity due to excess calories (Multi)      (Morbid obesity due to excess calories (Multi) [E66.01])    Surgeons: Olga Cervantes MD Responsible Provider: Alan East MD    Anesthesia Type: general ASA Status: 3            Anesthesia Type: general    Vitals Value Taken Time   /91 10/14/24 0916   Temp 36.5 10/14/24 0916   Pulse 99 10/14/24 0916   Resp 14 10/14/24 0916   SpO2 98 10/14/24 0916       Anesthesia Post Evaluation    Patient participation: complete - patient participated  Level of consciousness: awake  Pain management: adequate  Airway patency: patent  Cardiovascular status: acceptable  Respiratory status: acceptable  Hydration status: acceptable  Postoperative Nausea and Vomiting: none        No notable events documented.

## 2024-10-14 NOTE — OP NOTE
LAPAROSCOPIC GASTRIC SLEEVE / EGD / TAP BLOCK Operative Note     Date: 10/14/2024  OR Location: [facility]    Name: Mariana Aguirre, : 1970, Age: 54 y.o., MRN: [id number], Sex: female    Diagnosis  Pre-op Diagnosis      * Morbid obesity due to excess calories (Multi) [E66.01] Post-op Diagnosis     * Morbid obesity due to excess calories (Multi) [E66.01]     Procedures  LAPAROSCOPIC GASTRIC SLEEVE / EGD / TAP BLOCK  04421 - TN LAPS GSTRC RSTRICTIV PX LONGITUDINAL GASTRECTOMY    TN EGD TRANSORAL BIOPSY SINGLE/MULTIPLE [28702]  Surgeons      * Olga Cervantes - Primary    Resident/Fellow/Other Assistant:  Surgeons and Role:     * Marty Sanderson MD - Assisting    Procedure Summary  Anesthesia: General  ASA: III  Anesthesia Staff: Anesthesiologist: Alan East MD  C-AA: ROBERT Bey  Estimated Blood Loss: 15mL  Intra-op Medications:   Administrations occurring from 0730 to 0900 on 10/14/24:   Medication Name Total Dose   BUPivacaine HCl (Marcaine) 0.25 % (2.5 mg/mL) injection 50 mL   ceFAZolin (Ancef) 3 g in dextrose 5%  mL 3 g              Anesthesia Record               Intraprocedure I/O Totals          Intake    LR bolus 400.00 mL    ceFAZolin (Ancef) 3 g in dextrose 5%  mL 100.00 mL    Total Intake 500 mL       Output    Est. Blood Loss 10 mL    Total Output 10 mL       Net    Net Volume 490 mL          Specimen:   ID Type Source Tests Collected by Time   1 : PORTION OF STOMACH Tissue STOMACH SLEEVE RESECTION SURGICAL PATHOLOGY EXAM Olga Cervantes MD 10/14/2024 0842        Staff:   Circulator: Jessica Bowman Person: Archana         Drains and/or Catheters: * None in log *    Tourniquet Times:         Implants:     Findings: intra abdominal adiposity, negative leak test     Indications: Mariana Aguirre is an 54 y.o. female who is having surgery for Morbid obesity due to excess calories (Multi) [E66.01].     The patient was seen in the preoperative area. The risks, benefits, complications,  treatment options, non-operative alternatives, expected recovery and outcomes were discussed with the patient. The possibilities of reaction to medication, pulmonary aspiration, injury to surrounding structures, bleeding, recurrent infection, the need for additional procedures, failure to diagnose a condition, and creating a complication requiring transfusion or operation were discussed with the patient. The patient concurred with the proposed plan, giving informed consent.  The site of surgery was properly noted/marked if necessary per policy. The patient has been actively warmed in preoperative area. Preoperative antibiotics have been ordered and given within 1 hours of incision. Venous thrombosis prophylaxis have been ordered including bilateral sequential compression devices and chemical prophylaxis    Procedure Details: Ms. Aguirre with BMI >43 was scheduled on elective basis for sleeve gastrectomy and related procedures.  On the day of surgery after verification of informed consent she was given subcu heparin and was taken to operating room after anesthesia evaluation.  She was placed in supine position on the table, SCDs were placed, timeout was done, antibiotics were given, general anesthesia was established, standard, sterile preparation and draping of abdominal wall was performed.  Entry into abdominal cavity was obtained using 5 mm optical trocar in the left midclavicular line and upper quadrant without technical problems.  Pneumoperitoneum was established, there was no iatrogenic injuries.  Additional trocars were placed in the left axillary and right upper quadrant areas and 15 mm port in the periumbilical area.  Adhesions of omentum in RUQ were taken down with ligasure. TAP Block was performed under laparoscopic vision on both sides. Liver was floppy, was retracted with Nathonson through epigastrium. Hiatus was inspected and it was grossly intact. Omentum was dissected with LigaSure device from  prepyloric area all the way to the gastric fundus which was completely mobilized and dissection was done onto the left simi to make sure there was no posterior hiatal hernia which there was none. There were posterior adhesions which were divided with ligasure. Omentum was very fatty. 36 Tamazight VISI G-tube was inserted by anesthesiologist and guided into the pyloric channel.  Sleeve gastrectomy was created with a green load followed by 2 gold and then serially blue loads all with seamguard and finally a white load to complete staple line were used on the outer edge of the VISI G-tube making sure no narrowing was created at any point.  Staple lines were inspected and 5 mm clips were applied for oozing and no technical issues were noted.  Leak test was performed using VISI G-tube under waterseal it was negative.  Tube was removed and gastroscopy was performed.  There was no luminal narrowing angulation or any other technical issues, leak or active bleeding noted.  Gastroscope was removed.  Specimen was retrieved at the periumbilical port site and fascial defect was closed with 2 interrupted 0 Vicryl sutures using Endo passer.  After final satisfactory examination abdominal cavity pneumoperitoneum was discontinued and trocars were removed.  Skin was closed with 3-0 Monocryl.  Dermabond was applied.  Patient tolerated the operation well, was extubated in the OR and transferred to recovery room in stable condition.  Instrument, needle, sponge counts were correct at conclusion of the operation.     Complications:  None; patient tolerated the procedure well.    Disposition: PACU - hemodynamically stable.  Condition: stable         Additional Details: Dr. Sanderson was scrubbed and actively assisted in entire case, no qualified resident was available.       Attending Attestation: I was present and scrubbed for the entire procedure.    Olga Cervantes  Phone Number: [phone]

## 2024-10-14 NOTE — PROGRESS NOTES
10/14/24 1344   Discharge Planning   Living Arrangements Spouse/significant other;Children   Support Systems Spouse/significant other;Children;Family members   Assistance Needed independent and driving PTA   Type of Residence Private residence   Do you have animals or pets at home? Yes   Type of Animals or Pets 2 dogs   Home or Post Acute Services None   Expected Discharge Disposition Home     This TCC met with patient at bedside, introduced self and explained role.  Demographic information and insurance verified.  Patient is from home with spouse, independent, and driving.  Denies SW needs at this time.  Patient plans to return home at discharge, no needs.  Patient's spouse is planned to transport patient home at discharge.  Care Transitions will continue to follow.

## 2024-10-14 NOTE — BRIEF OP NOTE
Date: 10/14/2024  OR Location: [facility]    Name: Mariana Aguirre, : 1970, Age: 54 y.o., MRN: [id number], Sex: female    Diagnosis  Pre-op Diagnosis      * Morbid obesity due to excess calories (Multi) [E66.01] Post-op Diagnosis     * Morbid obesity due to excess calories (Multi) [E66.01]     Procedures  LAPAROSCOPIC GASTRIC SLEEVE / EGD / TAP BLOCK  66961 - CO LAPS GSTRC RSTRICTIV PX LONGITUDINAL GASTRECTOMY    CO EGD TRANSORAL BIOPSY SINGLE/MULTIPLE [62349]  Surgeons      * Olga Cervantes - Primary    Resident/Fellow/Other Assistant:  Surgeons and Role:     * Marty Sanderson MD - Assisting    Procedure Summary  Anesthesia: General  ASA: III  Anesthesia Staff: Anesthesiologist: Alan East MD  C-AA: ROBERT Bey    Estimated Blood Loss: 15mL    Intra-op Medications:   Administrations occurring from 0730 to 0900 on 10/14/24:   Medication Name Total Dose   BUPivacaine HCl (Marcaine) 0.25 % (2.5 mg/mL) injection 50 mL   ceFAZolin (Ancef) 3 g in dextrose 5%  mL 3 g              Anesthesia Record               Intraprocedure I/O Totals          Intake    LR bolus 400.00 mL    ceFAZolin (Ancef) 3 g in dextrose 5%  mL 100.00 mL    Total Intake 500 mL       Output    Est. Blood Loss 10 mL    Total Output 10 mL       Net    Net Volume 490 mL          Specimen:   ID Type Source Tests Collected by Time   1 : PORTION OF STOMACH Tissue STOMACH SLEEVE RESECTION SURGICAL PATHOLOGY EXAM Olga Cervantes MD 10/14/2024 0842        Staff:   Circulator: Jessica Bowman Person: Archana    Findings:   -No hiatal hernia  - Normal-appearing stomach  - No significant intra-abdominal adhesions    Procedure: Entry into the abdomen was achieved using a 5 mm optical trocar.  The gastro-epiploic ligament was divided, mobilizing the greater curvature.  The fundus was completely mobilized, short gastric vessels were taken down, the left simi was exposed.  The sleeve gastrectomy was created by sequential firings of an Endo RBOOKE  stapler from the pylorus to the angle of His.  The 12 mm incision was closed using 0 Vicryl transfascial sutures, the skin incisions were closed using 3-0 Monocryl.  The abdomen was desufflated, the patient was extubated and taken to recovery room without incident.    Complications:  None; patient tolerated the procedure well.       Disposition: PACU - hemodynamically stable.    Condition: stable    Specimens Collected:   ID Type Source Tests Collected by Time   1 : PORTION OF STOMACH Tissue STOMACH SLEEVE RESECTION SURGICAL PATHOLOGY EXAM Olga Cervantes MD 10/14/2024 0842     Attending Attestation: I was present and scrubbed for the entire procedure.    Olga Cervantes  Phone Number: [phone]

## 2024-10-15 ENCOUNTER — PHARMACY VISIT (OUTPATIENT)
Dept: PHARMACY | Facility: CLINIC | Age: 54
End: 2024-10-15
Payer: COMMERCIAL

## 2024-10-15 ENCOUNTER — APPOINTMENT (OUTPATIENT)
Dept: RADIOLOGY | Facility: HOSPITAL | Age: 54
DRG: 621 | End: 2024-10-15
Payer: COMMERCIAL

## 2024-10-15 LAB
ALBUMIN SERPL BCP-MCNC: 4.1 G/DL (ref 3.4–5)
ALP SERPL-CCNC: 78 U/L (ref 33–110)
ALT SERPL W P-5'-P-CCNC: 26 U/L (ref 7–45)
ANION GAP SERPL CALC-SCNC: 13 MMOL/L (ref 10–20)
AST SERPL W P-5'-P-CCNC: 31 U/L (ref 9–39)
BASOPHILS # BLD AUTO: 0.02 X10*3/UL (ref 0–0.1)
BASOPHILS NFR BLD AUTO: 0.3 %
BILIRUB SERPL-MCNC: 0.4 MG/DL (ref 0–1.2)
BUN SERPL-MCNC: 20 MG/DL (ref 6–23)
CALCIUM SERPL-MCNC: 9.4 MG/DL (ref 8.6–10.3)
CHLORIDE SERPL-SCNC: 102 MMOL/L (ref 98–107)
CO2 SERPL-SCNC: 26 MMOL/L (ref 21–32)
CREAT SERPL-MCNC: 0.88 MG/DL (ref 0.5–1.05)
EGFRCR SERPLBLD CKD-EPI 2021: 79 ML/MIN/1.73M*2
EOSINOPHIL # BLD AUTO: 0.03 X10*3/UL (ref 0–0.7)
EOSINOPHIL NFR BLD AUTO: 0.4 %
ERYTHROCYTE [DISTWIDTH] IN BLOOD BY AUTOMATED COUNT: 16.2 % (ref 11.5–14.5)
GLUCOSE SERPL-MCNC: 86 MG/DL (ref 74–99)
HCT VFR BLD AUTO: 31.7 % (ref 36–46)
HGB BLD-MCNC: 9.9 G/DL (ref 12–16)
IMM GRANULOCYTES # BLD AUTO: 0.03 X10*3/UL (ref 0–0.7)
IMM GRANULOCYTES NFR BLD AUTO: 0.4 % (ref 0–0.9)
LYMPHOCYTES # BLD AUTO: 2.38 X10*3/UL (ref 1.2–4.8)
LYMPHOCYTES NFR BLD AUTO: 33.5 %
MCH RBC QN AUTO: 25.7 PG (ref 26–34)
MCHC RBC AUTO-ENTMCNC: 31.2 G/DL (ref 32–36)
MCV RBC AUTO: 82 FL (ref 80–100)
MONOCYTES # BLD AUTO: 0.47 X10*3/UL (ref 0.1–1)
MONOCYTES NFR BLD AUTO: 6.6 %
NEUTROPHILS # BLD AUTO: 4.17 X10*3/UL (ref 1.2–7.7)
NEUTROPHILS NFR BLD AUTO: 58.8 %
NRBC BLD-RTO: 0 /100 WBCS (ref 0–0)
PLATELET # BLD AUTO: 234 X10*3/UL (ref 150–450)
POTASSIUM SERPL-SCNC: 4.3 MMOL/L (ref 3.5–5.3)
PROT SERPL-MCNC: 7.2 G/DL (ref 6.4–8.2)
RBC # BLD AUTO: 3.85 X10*6/UL (ref 4–5.2)
SODIUM SERPL-SCNC: 137 MMOL/L (ref 136–145)
WBC # BLD AUTO: 7.1 X10*3/UL (ref 4.4–11.3)

## 2024-10-15 PROCEDURE — 74240 X-RAY XM UPR GI TRC 1CNTRST: CPT

## 2024-10-15 PROCEDURE — 99024 POSTOP FOLLOW-UP VISIT: CPT | Performed by: STUDENT IN AN ORGANIZED HEALTH CARE EDUCATION/TRAINING PROGRAM

## 2024-10-15 PROCEDURE — 85025 COMPLETE CBC W/AUTO DIFF WBC: CPT | Performed by: STUDENT IN AN ORGANIZED HEALTH CARE EDUCATION/TRAINING PROGRAM

## 2024-10-15 PROCEDURE — 2550000001 HC RX 255 CONTRASTS: Performed by: SURGERY

## 2024-10-15 PROCEDURE — 36415 COLL VENOUS BLD VENIPUNCTURE: CPT | Performed by: STUDENT IN AN ORGANIZED HEALTH CARE EDUCATION/TRAINING PROGRAM

## 2024-10-15 PROCEDURE — 2500000002 HC RX 250 W HCPCS SELF ADMINISTERED DRUGS (ALT 637 FOR MEDICARE OP, ALT 636 FOR OP/ED): Performed by: STUDENT IN AN ORGANIZED HEALTH CARE EDUCATION/TRAINING PROGRAM

## 2024-10-15 PROCEDURE — 74240 X-RAY XM UPR GI TRC 1CNTRST: CPT | Performed by: STUDENT IN AN ORGANIZED HEALTH CARE EDUCATION/TRAINING PROGRAM

## 2024-10-15 PROCEDURE — 2500000004 HC RX 250 GENERAL PHARMACY W/ HCPCS (ALT 636 FOR OP/ED): Performed by: STUDENT IN AN ORGANIZED HEALTH CARE EDUCATION/TRAINING PROGRAM

## 2024-10-15 PROCEDURE — 1100000001 HC PRIVATE ROOM DAILY

## 2024-10-15 PROCEDURE — 2500000001 HC RX 250 WO HCPCS SELF ADMINISTERED DRUGS (ALT 637 FOR MEDICARE OP): Performed by: STUDENT IN AN ORGANIZED HEALTH CARE EDUCATION/TRAINING PROGRAM

## 2024-10-15 PROCEDURE — 84075 ASSAY ALKALINE PHOSPHATASE: CPT | Performed by: STUDENT IN AN ORGANIZED HEALTH CARE EDUCATION/TRAINING PROGRAM

## 2024-10-15 RX ORDER — ACETAMINOPHEN 325 MG/1
975 TABLET ORAL EVERY 6 HOURS PRN
Status: DISCONTINUED | OUTPATIENT
Start: 2024-10-15 | End: 2024-10-16 | Stop reason: HOSPADM

## 2024-10-15 RX ORDER — METOPROLOL TARTRATE 50 MG/1
50 TABLET ORAL EVERY 12 HOURS
Status: DISCONTINUED | OUTPATIENT
Start: 2024-10-15 | End: 2024-10-16 | Stop reason: HOSPADM

## 2024-10-15 RX ORDER — LABETALOL HYDROCHLORIDE 5 MG/ML
10 INJECTION, SOLUTION INTRAVENOUS ONCE
Status: DISCONTINUED | OUTPATIENT
Start: 2024-10-15 | End: 2024-10-15

## 2024-10-15 RX ORDER — HYDRALAZINE HYDROCHLORIDE 20 MG/ML
10 INJECTION INTRAMUSCULAR; INTRAVENOUS ONCE
Status: COMPLETED | OUTPATIENT
Start: 2024-10-15 | End: 2024-10-15

## 2024-10-15 RX ORDER — TERAZOSIN 10 MG/1
10 CAPSULE ORAL NIGHTLY
Qty: 30 CAPSULE | Refills: 0 | Status: SHIPPED | OUTPATIENT
Start: 2024-10-16 | End: 2024-11-15

## 2024-10-15 RX ORDER — DIATRIZOATE MEGLUMINE AND DIATRIZOATE SODIUM 660; 100 MG/ML; MG/ML
60 SOLUTION ORAL; RECTAL ONCE
Status: COMPLETED | OUTPATIENT
Start: 2024-10-15 | End: 2024-10-15

## 2024-10-15 RX ORDER — SIMETHICONE 80 MG
80 TABLET,CHEWABLE ORAL EVERY 6 HOURS
Status: DISCONTINUED | OUTPATIENT
Start: 2024-10-15 | End: 2024-10-16 | Stop reason: HOSPADM

## 2024-10-15 RX ORDER — TERAZOSIN 5 MG/1
10 CAPSULE ORAL ONCE
Status: COMPLETED | OUTPATIENT
Start: 2024-10-15 | End: 2024-10-15

## 2024-10-15 ASSESSMENT — COGNITIVE AND FUNCTIONAL STATUS - GENERAL
DAILY ACTIVITIY SCORE: 24
MOBILITY SCORE: 24

## 2024-10-15 ASSESSMENT — PAIN SCALES - GENERAL
PAINLEVEL_OUTOF10: 0 - NO PAIN
PAINLEVEL_OUTOF10: 0 - NO PAIN
PAINLEVEL_OUTOF10: 3

## 2024-10-15 ASSESSMENT — PAIN SCALES - WONG BAKER: WONGBAKER_NUMERICALRESPONSE: HURTS LITTLE BIT

## 2024-10-15 ASSESSMENT — PAIN DESCRIPTION - ORIENTATION: ORIENTATION: MID

## 2024-10-15 ASSESSMENT — PAIN DESCRIPTION - LOCATION: LOCATION: HEAD

## 2024-10-15 ASSESSMENT — PAIN - FUNCTIONAL ASSESSMENT: PAIN_FUNCTIONAL_ASSESSMENT: 0-10

## 2024-10-15 NOTE — CONSULTS
Nutrition Diet Education        Education Documentation  Nutrition Care Manual, taught by Velma Gorman RD at 10/15/2024  9:47 AM.  Learner: Patient  Readiness: Acceptance  Method: Explanation, Demonstration  Response: Verbalizes Understanding      Consult received for bariatric diet instructions.  Pt. is s/p sleeve gastrectomy.  Pt. is demonstrating proper completion of hydration monitoring worksheet per bariatric guidelines/protocol.   Reviewed importance of adequate hydration during recovery process.  Pt. had good understanding of this concept.  Pt following with a bariatric RD.     Follow Up:     Time Spent (min): 15 minutes  Last Date of Nutrition Visit: 10/15/24  Nutrition Follow-Up Needed?: Dietitian to reassess per policy  Follow up Comment: bariatric education

## 2024-10-15 NOTE — CARE PLAN
The patient's goals for the shift include Pain control and safety    The clinical goals for the shift include Pain control      Problem: Pain - Adult  Goal: Verbalizes/displays adequate comfort level or baseline comfort level  Outcome: Progressing     Problem: Safety - Adult  Goal: Free from fall injury  Outcome: Progressing     Problem: Discharge Planning  Goal: Discharge to home or other facility with appropriate resources  Outcome: Progressing     Problem: Chronic Conditions and Co-morbidities  Goal: Patient's chronic conditions and co-morbidity symptoms are monitored and maintained or improved  Outcome: Progressing     Problem: Fall/Injury  Goal: Not fall by end of shift  Outcome: Progressing  Goal: Be free from injury by end of the shift  Outcome: Progressing  Goal: Verbalize understanding of personal risk factors for fall in the hospital  Outcome: Progressing  Goal: Verbalize understanding of risk factor reduction measures to prevent injury from fall in the home  Outcome: Progressing  Goal: Use assistive devices by end of the shift  Outcome: Progressing  Goal: Pace activities to prevent fatigue by end of the shift  Outcome: Progressing     Problem: Skin  Goal: Decreased wound size/increased tissue granulation at next dressing change  Outcome: Progressing  Goal: Participates in plan/prevention/treatment measures  Outcome: Progressing  Goal: Prevent/manage excess moisture  Outcome: Progressing  Goal: Prevent/minimize sheer/friction injuries  Outcome: Progressing  Goal: Promote/optimize nutrition  Outcome: Progressing  Goal: Promote skin healing  Outcome: Progressing     Problem: Pain  Goal: Takes deep breaths with improved pain control throughout the shift  Outcome: Progressing  Goal: Turns in bed with improved pain control throughout the shift  Outcome: Progressing  Goal: Walks with improved pain control throughout the shift  Outcome: Progressing  Goal: Performs ADL's with improved pain control throughout  shift  Outcome: Progressing  Goal: Participates in PT with improved pain control throughout the shift  Outcome: Progressing  Goal: Free from opioid side effects throughout the shift  Outcome: Progressing  Goal: Free from acute confusion related to pain meds throughout the shift  Outcome: Progressing

## 2024-10-15 NOTE — DISCHARGE INSTRUCTIONS
PLEASE NOTE THE FOLLOWING CHANGES IN YOUR MEDICATION REGIMEN:    Medications:  - Restart your Hytrin tonight at bedtime  - Medications should be crushed and mixed with full liquids. Capsules may be opened and mixed with full liquids.  - Extended release medications should not be taken. Please contact your primary care physician to convert extended release medications to immediate release form.   - Take 650mg Tylenol with the tylenol you can take tablets (plain white tablets and cut into smaller pieces or crush in applesauce) or Tylenol powder (new product, 500mg per packet, dissolves in the mouth and does not need water every 6 hours for pain. Take between doses of your opioid pain medication. Try to limit the use of your opioid pain medication and only take as needed.  - Slowly add your vitamins to your daily medication regimen as tolerated. You do not have to take them within the first few days after surgery if they are causing you nausea or other problems.   - If you have medications that you still cannot tolerate by your first visit with your surgeon or dietitian, please discuss this.   - You should be receiving or already have received the following medications for your postoperative course: a proton pump inhibitor for acid suppression (omeprazole, esomeprazole, pantoprazole), antinausea medication (ondansetron, metoclopramide), and pain medication (oxycodone, morphine, hydromorphone, hydrocodone). If you are missing any of these, please call the office immediately so we can prescribe them for you.  - OPEN UP OMEPRAZOLE CAPSULES AND SPRINKLE THEM IN WATER PRIOR TO TAKING. IF YOU HAD BARIATRIC SURGERY, YOU WILL CONTINUE THIS MEDICATION FOR AT LEAST 6 MONTHS.      Constipation  - Take fiber (benefiber or metamucil) twice daily. Please also take colace (docusate) twice a day while taking oxycodone or other opiates. If you remain constipated despite these medications, please take milk of magnesia and call the  office to notify us.      Activity instructions:   - No lifting, pulling, or pushing objects greater than 15 pounds for 4 weeks.  - Activity otherwise as tolerated.   - You may shower. Soap and water may run over your incisions. Pat dry. No submerging in baths or  swimming (activities that keep your incisions underwater) for at least two weeks.    - You may not drive while taking narcotics.     Diet:   - You will go home on a full liquid diet (similar to the diet you were on your final day in the hospital)  - You will stay on this full liquid diet for two weeks. Acceptable full liquids include protein shakes, sugar free jello, sugar free pudding, and creamy soups (no chunks). You will continue to drink clear liquids including water and crystal light. You should aim for 64 ounces of fluid per day, with about half of this being full liquids and half of this being clear liquids. Do not exceed 8 oz per hour.  - After two weeks, you should have a discussion with the dietician about progressing to a combination of full liquid and pureed foods.   - Follow a healthy bariatric diet. Target 5 meals per day (3 mid size meals and two small meals). Avoid concentrated sweets (candy, soda) and limit carbohydrate intake with a preference for healthy proteins (lean meats, beans  - For further information, follow the diet instructions listed in your bariatric surgery instruction packet.     Call Provider If:  - Breathing faster or harder than normal.   - Fever of 100.4 F (38 C) or higher or feeling of chills.   - Feeling very sleepy and difficult to awaken.   - Inability to drink or significant decrease in ability to drink since discharge.   - Vomiting (throwing up) and not able to eat or drink for 12 hours.   - Urinating much less than your normal.  - More than 4 loose, watery bowel movements in 24 hours (diarrhea).   - Any new concerning symptoms.

## 2024-10-15 NOTE — DISCHARGE SUMMARY
Discharge Diagnosis  Morbid obesity due to excess calories (Multi)    Issues Requiring Follow-Up  Routine post-op follow up    Test Results Pending At Discharge  Pending Labs       Order Current Status    Surgical Pathology Exam In process            Hospital Course  Patient presented to the hospital for scheduled sleeve gastrectomy. The procedure was carried out without complications. No issues POD#0. Post operative esophogram was negative for leak or obstruction. Diet was advanced POD#1 without issue. Nausea was adequately controlled. Pain was adequately controlled. Patient did well post operatively overall. Patient discharged home on POD#1 once tolerating 5-8 oz of liquid diet per hour.     Pertinent Physical Exam At Time of Discharge  Physical Exam  Patient is in no acute distress  No respiratory distress  Chest is clear to auscultation  Abdomen is soft, not tender, not distended  No guarding   Incisions are clean dry and intact  Patient is alert and oriented x 3  Home Medications     Medication List      START taking these medications     ondansetron ODT 4 mg disintegrating tablet; Commonly known as:   Zofran-ODT; Dissolve 1 tablet (4 mg) by mouth every 6 hours if needed for   nausea or vomiting.   pantoprazole 40 mg EC tablet; Commonly known as: ProtoNix; Take 1 tablet   (40 mg) by mouth once daily.     CONTINUE taking these medications     allopurinol 300 mg tablet; Commonly known as: Zyloprim; TAKE 1   TABLET(300 MG) BY MOUTH EVERY DAY   COVID-19 At-Home Test kit; Generic drug: COVID-19 antigen test; Use test   as instructed   metoprolol succinate  mg 24 hr tablet; Commonly known as:   Toprol-XL; Take 1 tablet (100 mg) by mouth once daily.   omeprazole 40 mg DR capsule; Commonly known as: PriLOSEC; Take 1 capsule   (40 mg) by mouth once daily in the morning. Take before meals. Do not   crush or chew. Open capsule, sprinkle beads on SF jello, pudding or   applesauce.   telmisartan 80 mg tablet; Commonly  known as: MIcarDIS; Take 1 tablet (80   mg) by mouth once daily.     STOP taking these medications     ciprofloxacin 500 mg tablet; Commonly known as: Cipro   diclofenac 75 mg EC tablet; Commonly known as: Voltaren   meloxicam 15 mg tablet; Commonly known as: Mobic   terazosin 10 mg capsule; Commonly known as: Hytrin       Outpatient Follow-Up  Future Appointments   Date Time Provider Department [place]   10/24/2024 10:15 AM Rox Boss RD [id number] [place]   10/24/2024 10:30 AM Olga Cervantes MD [id number] [place]   11/27/2024  3:30 PM Rox Boss RD [id number] [place]   11/29/2024  4:30 PM BETSY Mccarthy [id number] [place]   1/8/2025  3:30 PM Rox Boss RD [id number] [place]   1/8/2025  4:30 [id number] [place]       Marty Sanderson MD

## 2024-10-15 NOTE — PROGRESS NOTES
Met with patient at bedside.  Patient anticipates discharge home today, no skilled needs. Patient's spouse providing transport.  Care Transitions will continue to follow.

## 2024-10-16 ENCOUNTER — TELEPHONE (OUTPATIENT)
Dept: SURGERY | Facility: CLINIC | Age: 54
End: 2024-10-16
Payer: COMMERCIAL

## 2024-10-16 ENCOUNTER — TELEPHONE (OUTPATIENT)
Dept: PRIMARY CARE | Facility: CLINIC | Age: 54
End: 2024-10-16
Payer: COMMERCIAL

## 2024-10-16 VITALS
TEMPERATURE: 97.9 F | DIASTOLIC BLOOD PRESSURE: 91 MMHG | RESPIRATION RATE: 18 BRPM | SYSTOLIC BLOOD PRESSURE: 164 MMHG | HEIGHT: 66 IN | WEIGHT: 270.2 LBS | BODY MASS INDEX: 43.42 KG/M2 | OXYGEN SATURATION: 95 % | HEART RATE: 87 BPM

## 2024-10-16 PROCEDURE — 2500000001 HC RX 250 WO HCPCS SELF ADMINISTERED DRUGS (ALT 637 FOR MEDICARE OP): Performed by: STUDENT IN AN ORGANIZED HEALTH CARE EDUCATION/TRAINING PROGRAM

## 2024-10-16 PROCEDURE — 2500000002 HC RX 250 W HCPCS SELF ADMINISTERED DRUGS (ALT 637 FOR MEDICARE OP, ALT 636 FOR OP/ED): Performed by: STUDENT IN AN ORGANIZED HEALTH CARE EDUCATION/TRAINING PROGRAM

## 2024-10-16 PROCEDURE — 2500000004 HC RX 250 GENERAL PHARMACY W/ HCPCS (ALT 636 FOR OP/ED): Performed by: STUDENT IN AN ORGANIZED HEALTH CARE EDUCATION/TRAINING PROGRAM

## 2024-10-16 RX ORDER — TERAZOSIN 2 MG/1
2 CAPSULE ORAL ONCE
Status: COMPLETED | OUTPATIENT
Start: 2024-10-16 | End: 2024-10-16

## 2024-10-16 ASSESSMENT — PAIN SCALES - GENERAL: PAINLEVEL_OUTOF10: 0 - NO PAIN

## 2024-10-16 NOTE — PROGRESS NOTES
BARIATRIC SURGERY CLINIC  FOLLOW UP NOTE      Name: Mariana Aguirre  MRN: [id number]      Index Surgery  Date of Surgery: 10/16/2024   Surgeon: Dr. Cervantes    Surgical Procedure: Laparoscopic sleeve gastrectomy  [id number]    HPI:   Presenting for follow up visit 1 week s/p sleeve gastrectomy on 10/14/2024.     Diet: full liquids    Exercise: walking    Concerns related to:  Nausea/Vomiting, Reflux: denies  Abdominal Pain: as expected   Diarrhea/Constipation: diarrhea     DAILY SUPPLEMENTS:  Calcium: Calcium Citrate w/ vitamin D (1200 - 1500mg)  Multivitamin & Minerals: 2 per day  Vitamin B12: 500 mcg/day   Vitamin D3: 3000 units  PPI: Omeprazole.       Current Outpatient Medications   Medication Sig Dispense Refill    allopurinol (Zyloprim) 300 mg tablet TAKE 1 TABLET(300 MG) BY MOUTH EVERY DAY 90 tablet 1    metoprolol succinate XL (Toprol-XL) 100 mg 24 hr tablet Take 1 tablet (100 mg) by mouth once daily. 90 tablet 1    omeprazole (PriLOSEC) 40 mg DR capsule Take 1 capsule (40 mg) by mouth once daily in the morning. Take before meals. Do not crush or chew. Open capsule, sprinkle beads on SF jello, pudding or applesauce. 90 capsule 1    ondansetron ODT (Zofran-ODT) 4 mg disintegrating tablet Dissolve 1 tablet (4 mg) by mouth every 6 hours if needed for nausea or vomiting. 60 tablet 1    telmisartan (MIcarDIS) 80 mg tablet Take 1 tablet (80 mg) by mouth once daily. 90 tablet 1    terazosin (Hytrin) 10 mg capsule Take 1 capsule (10 mg) by mouth once daily at bedtime. Do not fill before October 16, 2024. 30 capsule 0     No current facility-administered medications for this visit.       Comorbidities:  Patient Active Problem List   Diagnosis    Anemia    Chronic gout of left wrist    GERD (gastroesophageal reflux disease)    Gout    Hypertension    Left ventricular hypertrophy    Rash of back    Right knee DJD    Obesity, morbid, BMI 40.0-49.9 (Multi)    Bariatric surgery status    LIEN (obstructive sleep apnea)    Morbid  "obesity due to excess calories (Multi)    Morbid (severe) obesity due to excess calories (Multi)         REVIEW OF SYSTEMS:  CONSTITUTIONAL: Patient denies fevers, chills, sweats and weight changes.  CARDIOVASCULAR: Patient denies chest pains, palpitations, orthopnea and paroxysmal nocturnal dyspnea.  RESPIRATORY: No dyspnea on exertion, no wheezing or cough.  GI: No nausea, vomiting, diarrhea, constipation, abdominal pain, hematochezia or melena.  MUSCULOSKELETAL: No myalgias or arthralgias.  NEUROLOGIC: No chronic headaches, no seizures. Patient denies numbness, tingling or weakness.  PSYCHIATRIC: Patient denies problems with mood disturbance. No problems with anxiety.  ENDOCRINE: No excessive urination or excessive thirst.  DERMATOLOGIC: Patient denies any rashes or skin changes.    PHYSICAL EXAM:  /79 (BP Location: Left arm, Patient Position: Sitting, BP Cuff Size: Thigh)   Pulse 97   Temp 36.3 °C (97.4 °F) (Oral)   Ht 1.651 m (5' 5\")   Wt 117 kg (257 lb 14.4 oz)   SpO2 98%   BMI 42.92 kg/m²   Alert, well appearing, no acute distress, nourished, hydrated.  Anicteric sclera, no ptosis  Facial symmetry  Neck supple  Unlabored respirations.  Easily conversant without increased respiratory effort  Oriented to person, place, time.  Judgement intact.  Appropriate mood, affect.   Abdomen soft, nondistended, nontender, incisions are healing well    ASSESSMENT & PLAN:  54 y.o. female presenting for follow up visit s/p sleeve gastrectomy last week    No acute post bariatric surgery complications  No acute issues or concerns presented today.  Tolerating diet with appropriate protein and fluid intake  Taking appropriate supplements  Bowel regularity yes  Exercise walking  Weight Loss: Initial  -> Current   Wt Readings from Last 1 Encounters:   10/24/24 117 kg (257 lb 14.4 oz)       Plan:  -Continue to follow protein forward, reduced calorie, whole foods based diet, follow diet direction of bariatric " RD  -Continue to participate in regular exercise with goal to achieve 200-300 minutes per week of aerobic & resistance training for preservation of lean body mass.  -Continue multivitamin and supplements to support micronutrient needs  -Ongoing need for anti reflux medications discussed and continued if appropriate - see orders.  -Bowel hygiene reviewed, encouraged adequate fluids, increased dietary fiber and reviewed as needed use of over the counter treatments to promote bowel support.   -Labs - see orders      Follow up in 5 weeks as scheduled with SHELLY Daniels-CNP (11/29/24)

## 2024-10-16 NOTE — CARE PLAN
Problem: Pain - Adult  Goal: Verbalizes/displays adequate comfort level or baseline comfort level  Outcome: Progressing     Problem: Safety - Adult  Goal: Free from fall injury  Outcome: Progressing     Problem: Discharge Planning  Goal: Discharge to home or other facility with appropriate resources  Outcome: Progressing     Problem: Chronic Conditions and Co-morbidities  Goal: Patient's chronic conditions and co-morbidity symptoms are monitored and maintained or improved  Outcome: Progressing     Problem: Fall/Injury  Goal: Not fall by end of shift  Outcome: Progressing  Goal: Be free from injury by end of the shift  Outcome: Progressing  Goal: Verbalize understanding of personal risk factors for fall in the hospital  Outcome: Progressing  Goal: Verbalize understanding of risk factor reduction measures to prevent injury from fall in the home  Outcome: Progressing  Goal: Use assistive devices by end of the shift  Outcome: Progressing  Goal: Pace activities to prevent fatigue by end of the shift  Outcome: Progressing     Problem: Skin  Goal: Decreased wound size/increased tissue granulation at next dressing change  Outcome: Progressing  Goal: Participates in plan/prevention/treatment measures  Outcome: Progressing  Goal: Prevent/manage excess moisture  Outcome: Progressing  Goal: Prevent/minimize sheer/friction injuries  Outcome: Progressing  Goal: Promote/optimize nutrition  Outcome: Progressing  Goal: Promote skin healing  Outcome: Progressing     Problem: Pain  Goal: Takes deep breaths with improved pain control throughout the shift  Outcome: Progressing  Goal: Turns in bed with improved pain control throughout the shift  Outcome: Progressing  Goal: Walks with improved pain control throughout the shift  Outcome: Progressing  Goal: Performs ADL's with improved pain control throughout shift  Outcome: Progressing  Goal: Participates in PT with improved pain control throughout the shift  Outcome: Progressing  Goal:  Free from opioid side effects throughout the shift  Outcome: Progressing  Goal: Free from acute confusion related to pain meds throughout the shift  Outcome: Progressing   The patient's goals for the shift include Pain control    The clinical goals for the shift include pain and BP control

## 2024-10-16 NOTE — TELEPHONE ENCOUNTER
Outbound call to check in post operatively. Patient stated so far she is doing well. Was just discharged 3 hours ago. Had to stay and extra day due to blood pressure. (will be following up with PCP this week on Friday)      Pain is as expected- reminded to alternate tylenol and oxycodone every 6 hours as needed for pain, abd binder, heating pad.     Passing gas, no BM. Advised to start MOM on Friday if no BM.     Denies signs of incisional infections.     Tolerating liquid diet- reaching goals    Reminded after hours phone number in the OMEGA [employer]ORGAN message Jasmina sent yesterday. Please call me tomorrow if you have any questions or concerns.

## 2024-10-16 NOTE — CARE PLAN
The patient's goals for the shift include Pain control and BP control    The clinical goals for the shift include advance diet      Problem: Pain - Adult  Goal: Verbalizes/displays adequate comfort level or baseline comfort level  Outcome: Progressing     Problem: Safety - Adult  Goal: Free from fall injury  Outcome: Progressing     Problem: Discharge Planning  Goal: Discharge to home or other facility with appropriate resources  Outcome: Progressing     Problem: Chronic Conditions and Co-morbidities  Goal: Patient's chronic conditions and co-morbidity symptoms are monitored and maintained or improved  Outcome: Progressing     Problem: Fall/Injury  Goal: Not fall by end of shift  Outcome: Progressing  Goal: Be free from injury by end of the shift  Outcome: Progressing  Goal: Verbalize understanding of personal risk factors for fall in the hospital  Outcome: Progressing  Goal: Verbalize understanding of risk factor reduction measures to prevent injury from fall in the home  Outcome: Progressing  Goal: Use assistive devices by end of the shift  Outcome: Progressing  Goal: Pace activities to prevent fatigue by end of the shift  Outcome: Progressing     Problem: Skin  Goal: Decreased wound size/increased tissue granulation at next dressing change  Outcome: Progressing  Goal: Participates in plan/prevention/treatment measures  Outcome: Progressing  Goal: Prevent/manage excess moisture  Outcome: Progressing  Goal: Prevent/minimize sheer/friction injuries  Outcome: Progressing  Goal: Promote/optimize nutrition  Outcome: Progressing  Goal: Promote skin healing  Outcome: Progressing     Problem: Pain  Goal: Takes deep breaths with improved pain control throughout the shift  Outcome: Progressing  Goal: Turns in bed with improved pain control throughout the shift  Outcome: Progressing  Goal: Walks with improved pain control throughout the shift  Outcome: Progressing  Goal: Performs ADL's with improved pain control throughout  shift  Outcome: Progressing  Goal: Participates in PT with improved pain control throughout the shift  Outcome: Progressing  Goal: Free from opioid side effects throughout the shift  Outcome: Progressing  Goal: Free from acute confusion related to pain meds throughout the shift  Outcome: Progressing   .

## 2024-10-16 NOTE — TELEPHONE ENCOUNTER
PLEASE ADVISE    Mariana CHAUDHRY Do [id number] [facility] Clinical Support Staff (supporting Anatoliy Good DO)13 hours ago (6:40 PM)       Good evening, so I had the sleeve done on Monday & now my BP is the highest it’s ever been.  I had to stay an extra day due to it. The last reading was 230/94 I believe.  They said it could be from surgery.

## 2024-10-18 ENCOUNTER — OFFICE VISIT (OUTPATIENT)
Dept: PRIMARY CARE | Facility: CLINIC | Age: 54
End: 2024-10-18
Payer: COMMERCIAL

## 2024-10-18 VITALS
TEMPERATURE: 97.7 F | SYSTOLIC BLOOD PRESSURE: 138 MMHG | BODY MASS INDEX: 37.85 KG/M2 | HEART RATE: 87 BPM | RESPIRATION RATE: 16 BRPM | HEIGHT: 65 IN | DIASTOLIC BLOOD PRESSURE: 80 MMHG | OXYGEN SATURATION: 99 % | WEIGHT: 227.2 LBS

## 2024-10-18 DIAGNOSIS — K21.9 GASTROESOPHAGEAL REFLUX DISEASE WITHOUT ESOPHAGITIS: ICD-10-CM

## 2024-10-18 DIAGNOSIS — Z98.84 BARIATRIC SURGERY STATUS: ICD-10-CM

## 2024-10-18 DIAGNOSIS — I10 HYPERTENSION, UNSPECIFIED TYPE: Primary | ICD-10-CM

## 2024-10-18 LAB
LABORATORY COMMENT REPORT: NORMAL
PATH REPORT.FINAL DX SPEC: NORMAL
PATH REPORT.GROSS SPEC: NORMAL
PATH REPORT.RELEVANT HX SPEC: NORMAL
PATH REPORT.TOTAL CANCER: NORMAL

## 2024-10-18 PROCEDURE — 1036F TOBACCO NON-USER: CPT | Performed by: FAMILY MEDICINE

## 2024-10-18 PROCEDURE — 3075F SYST BP GE 130 - 139MM HG: CPT | Performed by: FAMILY MEDICINE

## 2024-10-18 PROCEDURE — 3079F DIAST BP 80-89 MM HG: CPT | Performed by: FAMILY MEDICINE

## 2024-10-18 PROCEDURE — 99214 OFFICE O/P EST MOD 30 MIN: CPT | Performed by: FAMILY MEDICINE

## 2024-10-18 PROCEDURE — 3008F BODY MASS INDEX DOCD: CPT | Performed by: FAMILY MEDICINE

## 2024-10-18 ASSESSMENT — PATIENT HEALTH QUESTIONNAIRE - PHQ9
2. FEELING DOWN, DEPRESSED OR HOPELESS: NOT AT ALL
1. LITTLE INTEREST OR PLEASURE IN DOING THINGS: NOT AT ALL
SUM OF ALL RESPONSES TO PHQ9 QUESTIONS 1 AND 2: 0

## 2024-10-18 NOTE — PROGRESS NOTES
"Subjective   Patient ID: Mariana Aguirre is a 54 y.o. female who presents for weight loss surgery and follow-up on her blood pressure  HPI  Review of systems  ; Patient seen today for exam denies any problems with headaches or vision, denies any shortness of breath chest pain nausea or vomiting, no black stool no blood in the stool no heartburn type symptoms denies any problems with constipation or diarrhea, and no dysuria-type symptoms      Patient is present for BP check. Patient does not check it at home. Denies lightheadedness  and dizziness.  No chest pains no headaches    She had bariatric surgery and at times when she was stressed her blood pressure was elevated    Doing well no signs of infection everything is healing well and her abdomen    The patient's allergies medications were reviewed with them today    The patient's social family and surgical history or also reviewed here today, along with her past medical history.     Objective     Alert and active in  no acute distress  HEENT TMs clear oropharynx negative nares clear no drainage noted neck supple  With no adenopathy   Heart regular rate and rhythm without murmur and no carotid bruits  Lungs- clear to auscultation bilaterally, no wheeze or rhonchi noted  Thyroid -negative masses or nodularity  Abdomen- soft times four quadrants , bowel sounds positive no masses or organomegaly, negative tenderness guarding or rebound  Neurological exam unremarkable- DTRs in upper and lower extremities within normal limits.   skin -no lesions noted      /80   Pulse 87   Temp 36.5 °C (97.7 °F) (Temporal)   Resp 16   Ht 1.651 m (5' 5\")   Wt 103 kg (227 lb 3.2 oz)   SpO2 99%   BMI 37.81 kg/m²     Allergies   Allergen Reactions    Codeine Other     Gatrointestinal Upset    Hydrochlorothiazide Other     Edema, gout    Adhesive Tape-Silicones Rash    Morphine Unknown       Assessment/Plan   Problem List Items Addressed This Visit       GERD (gastroesophageal " reflux disease)    Hypertension - Primary    Bariatric surgery status   She has been doing well when she first had higherblood pressure was 154/76 it come down after she sat around and we had a discussion regarding things    We may have to add Aldactone if things continue to worsen I do not think so I think with her weight loss salt restrictions I think things will get better    She has been difficult to treat in the past    Feeling well no symptomatic symptoms let me know next week how she doing with her blood pressure      If anything worsens or changes please call us at once, follow up in the office as planned,

## 2024-10-18 NOTE — PROGRESS NOTES
Surgery Date:  10/16/24  Surgeon:  Helen  Procedure:  sleeve gastrectomy     ASSESSMENT:     Current weight pounds:     257.8                 Ht:  63.0  in.        BMI: 42.9  Previous weight pounds:   270.2  Initial start weight pounds:   290.0   1/5/24 SW  EBW pounds:  150.0   Total weight change pounds: 32.25  %EBW Lost: 21.5%     PROGRESS:   Nutrition Interventions for last encounter (date):   1.     Drink 64 oz of fluid daily  2.     Drink enough protein shakes to meet your goal of 60-70 g of protein per day  3.     Take 2 chewable multivitamins, 5862-2659 mg of calcium citrate, and 500 mcg of B12 daily  4.     Get up and walk frequently throughout the day.      CHANGES IN TREATMENT:        Patient met goals:    Partially   24 hour food recall:  Pt on a full liquid diet; 30 g protein and 40 oz fluid daily  Beverages:   water, Gatorade zero, bone broth, protein shake  Alcohol:   none        Vitamins:   2 Equate tablets,  1500 mg calcium citrate, B12  Physical Activity:  walking     READINESS TO LEARN:  Motivation to learn:           Interested      Understanding of instruction: Good      Anticipated Compliance:   Good           Family Support: Unable to assess-family not present                Patient presents with post-op weight loss surgery sleeve gastrectomy. The pt is 1 week post op. Patient has lost 32.25 pounds since initial assessment accounting for 21.5% loss excess body weight.  Patient tolerating a full liquid diet.  Protein intake is not adequate for post-op individual. Fluid consumption is in adequate. Patient is supplementing recommended vitamin/minerals. Pt states concerns and/or difficulties meeting  her goals. She doesn't feeling drinking her fluids.  Advised to keep a beverage in hand to encourage sipping. Reviewed what to counts as fluids and include her shakes in her fluid count  Reviewed the puree diet to start on 10/28.     Malnutrition Screening  Significant unintentional weight loss?  n/a  Eating less than 75% of usual intake for more than 2 weeks? n/a        Nutrition Diagnosis:   1.          Increased protein and nutrition needs related to altered GI function as evidenced by pt. s/p sleeve gastrectomy.             2.             Food- and nutrition-related knowledge deficit related to lack of prior exposure to surgical weight loss information as evidenced by diet recall.      Nutrition Interventions:   1.Modify type and amount of food and nutrients within meals and snacks.  2.Comprehensive Nutrition Education  -Nutrition education materials: Support Group Schedule          Recommendations:    1.          Continue to drink your protein shakes to meet your goal of 60-70 g of protein per day.   2.Continue to drink 64 oz. of zero calorie beverages per day  3.          Begin  no drinking 30 min before, during the meal and for 30 minutes after the meal when you start the puree diet on 10/28  4.          Continue to exercise   5.          Advance to the puree diet on 10/28 for 2 weeks, then soft food  on 11/11 for 2 weeks.  if you do not tolerate the puree diet, go back to the liquid diet for a few more days and then try puree again.  I will call you on 11/9 to review the soft diet before you start it.   6.         Continue to take all of your vitamins and minerals.   7.          Attend monthly support groups     Nutrition Monitoring and Evaluation:   1-2 pounds weight loss per week  Criteria: weight check, food recall  Need for Follow-up: 6 weeks post op

## 2024-10-24 ENCOUNTER — OFFICE VISIT (OUTPATIENT)
Dept: SURGERY | Facility: CLINIC | Age: 54
End: 2024-10-24
Payer: COMMERCIAL

## 2024-10-24 ENCOUNTER — NUTRITION (OUTPATIENT)
Dept: SURGERY | Facility: CLINIC | Age: 54
End: 2024-10-24
Payer: COMMERCIAL

## 2024-10-24 VITALS
TEMPERATURE: 97.4 F | BODY MASS INDEX: 42.97 KG/M2 | SYSTOLIC BLOOD PRESSURE: 137 MMHG | HEIGHT: 65 IN | OXYGEN SATURATION: 98 % | HEART RATE: 97 BPM | WEIGHT: 257.9 LBS | DIASTOLIC BLOOD PRESSURE: 79 MMHG

## 2024-10-24 DIAGNOSIS — Z90.3 STATUS POST SLEEVE GASTRECTOMY: Primary | ICD-10-CM

## 2024-10-24 PROCEDURE — 99211 OFF/OP EST MAY X REQ PHY/QHP: CPT | Performed by: SURGERY

## 2024-10-24 PROCEDURE — 3075F SYST BP GE 130 - 139MM HG: CPT | Performed by: SURGERY

## 2024-10-24 PROCEDURE — 3008F BODY MASS INDEX DOCD: CPT | Performed by: SURGERY

## 2024-10-24 PROCEDURE — 3078F DIAST BP <80 MM HG: CPT | Performed by: SURGERY

## 2024-10-24 RX ORDER — VIT C/E/ZN/COPPR/LUTEIN/ZEAXAN 250MG-90MG
500 CAPSULE ORAL DAILY
COMMUNITY

## 2024-10-24 RX ORDER — ASPIRIN 325 MG
1 TABLET ORAL 2 TIMES DAILY
COMMUNITY

## 2024-10-24 ASSESSMENT — PAIN SCALES - GENERAL: PAINLEVEL_OUTOF10: 0-NO PAIN

## 2024-11-03 NOTE — PROGRESS NOTES
History: Mariana is here for her right knee. She had a cortisone injection on 07/01/24. She notes that the slow-acting Zilretta injection has helped the most. The cortisone injection only lasted a couple of days. She's had gel injections in the past with some relief. She has been wearing a knee brace. Her achilles is bothering her as well. She had a gastric sleeve and has lost almost 50 lbs.      Past medical history: Multiple  Medications: Multiple  Allergies: No known drug allergies    Please refer to the intake H&P regarding the patient's review of systems, family history and social history as was done today    HEENT: Normal  Lungs: Clear to auscultation  Heart: RRR  Abdomen: Soft, nontender  Skin: clear  Extremity: She has tenderness again medially.  1+ crepitus with range of motion.  Negative Lachman and posterior drawer.  Mild laxity with varus stress.  No numbness.  Contralateral exam is normal for strength, motion, stability and neurovascular assessment.    Radiographs: X-rays show bone-on-bone articulation medially.    Assessment: Right knee DJD     Plan: Her right knee is continuing to bother her. We discussed several options for treatment including arthroplasty. She would like to consider arthroplasty in the future and continue with her weight loss regimen.  She has lost 60 pounds. For the time being, we will pre-cert her for a Zilretta injection today. She will follow-up for the injection.    All questions were answered today with the patient.    Addendum: We submitted for a Zilretta injection and it was denied by her insurance.  It appears they only approve the injection once per knee.  As such, she would like to submit for viscosupplementation.  She has gotten some relief with this in the past.  We will see her back for the injection upon approval.  Lacey Steele PA-C    Scribe Attestation  By signing my name below, Cesilia HERNANDEZ Scribe   attest that this documentation has been prepared  under the direction and in the presence of Mauricio Bowser MD.

## 2024-11-04 ENCOUNTER — OFFICE VISIT (OUTPATIENT)
Dept: ORTHOPEDIC SURGERY | Facility: CLINIC | Age: 54
End: 2024-11-04
Payer: COMMERCIAL

## 2024-11-04 ENCOUNTER — HOSPITAL ENCOUNTER (OUTPATIENT)
Dept: RADIOLOGY | Facility: CLINIC | Age: 54
Discharge: HOME | End: 2024-11-04
Payer: COMMERCIAL

## 2024-11-04 DIAGNOSIS — M17.11 PRIMARY OSTEOARTHRITIS OF RIGHT KNEE: ICD-10-CM

## 2024-11-04 PROCEDURE — 99213 OFFICE O/P EST LOW 20 MIN: CPT | Performed by: ORTHOPAEDIC SURGERY

## 2024-11-04 PROCEDURE — 73564 X-RAY EXAM KNEE 4 OR MORE: CPT | Mod: RT

## 2024-11-04 PROCEDURE — 1036F TOBACCO NON-USER: CPT | Performed by: ORTHOPAEDIC SURGERY

## 2024-11-04 PROCEDURE — 73564 X-RAY EXAM KNEE 4 OR MORE: CPT | Mod: RIGHT SIDE | Performed by: ORTHOPAEDIC SURGERY

## 2024-11-08 ENCOUNTER — PATIENT MESSAGE (OUTPATIENT)
Dept: PRIMARY CARE | Facility: CLINIC | Age: 54
End: 2024-11-08
Payer: COMMERCIAL

## 2024-11-08 DIAGNOSIS — B00.9 HERPES SIMPLEX: ICD-10-CM

## 2024-11-09 ENCOUNTER — TELEPHONE (OUTPATIENT)
Dept: SURGERY | Facility: CLINIC | Age: 54
End: 2024-11-09
Payer: COMMERCIAL

## 2024-11-09 NOTE — TELEPHONE ENCOUNTER
Called pt to check in and review the soft diet before she starts it on 11/11    [facility] for her to call back  Rox

## 2024-11-11 RX ORDER — VALACYCLOVIR HYDROCHLORIDE 500 MG/1
500 TABLET, FILM COATED ORAL DAILY
Qty: 30 TABLET | Refills: 5 | Status: SHIPPED | OUTPATIENT
Start: 2024-11-11 | End: 2025-05-10

## 2024-11-18 ENCOUNTER — TELEPHONE (OUTPATIENT)
Dept: SURGERY | Facility: CLINIC | Age: 54
End: 2024-11-18
Payer: COMMERCIAL

## 2024-11-18 NOTE — TELEPHONE ENCOUNTER
Returned pts phone call.   LVM letting her know that she cannot have rice until she is 3 months postop.     Rox

## 2024-11-27 ENCOUNTER — NUTRITION (OUTPATIENT)
Dept: SURGERY | Facility: CLINIC | Age: 54
End: 2024-11-27
Payer: COMMERCIAL

## 2024-11-27 ENCOUNTER — APPOINTMENT (OUTPATIENT)
Dept: SURGERY | Facility: CLINIC | Age: 54
End: 2024-11-27
Payer: COMMERCIAL

## 2024-11-27 VITALS — BODY MASS INDEX: 41.64 KG/M2 | HEIGHT: 63 IN | WEIGHT: 235 LBS

## 2024-11-27 NOTE — PROGRESS NOTES
Surgery Date:  10/16/24  Surgeon:  Helen  Procedure:  sleeve gastrectomy    ASSESSMENT:  Current weight pounds:     235.0               Ht:   63.0    in.        BMI:  41.6  Previous weight pounds:   257.8   10/24/24  Initial start weight pounds:   290.0   1/5/24 SW  EBW pounds:  150.0  Total weight change pounds:  55.0  %EBW Lost:    36.7%    PROGRESS:  Nutrition Interventions for last encounter (date):   1.          Continue to drink your protein shakes to meet your goal of 60-70 g of protein per day.   2.          Continue to drink 64 oz. of zero calorie beverages per day  3.          Begin  no drinking 30 min before, during the meal and for 30 minutes after the meal when you start the puree diet on 10/28  4.          Continue to exercise   5.          Advance to the puree diet on 10/28 for 2 weeks, then soft food  on 11/11 for 2 weeks.  if you do not tolerate the puree diet, go back to the liquid diet for a few more days and then try puree again.  I will call you on 11/9 to review the soft diet before you start it.   6.         Continue to take all of your vitamins and minerals.   7.          Attend monthly support groups    CHANGES IN TREATMENT:   Patient met goals:Partially   Actions to meet previous goals:   24 hour food recall:   Breakfast:  none   Snack:    none  Lunch:   1-2 oz pulled pork, 1-2 bite of macaroni salad  Snack:    none  Dinner:   2 oz ground beef, lettuce and cheese   Snack: none  Beverages:   40 oz fluid   Alcohol:  none      Vitamins:   2 Equate tablets,  1500 mg calcium citrate, B12  Physical Activity:  walking 0-2x/week     READINESS TO LEARN:  Motivation to learn:           Interested      Understanding of instruction: Good       Anticipated Compliance: Good       Family Support: Unable to assess-family not present     Patient presents with post-op weight loss surgery sleeve gastrectomy. The pt is 6 weeks postop.   Patient has lost  55.0  pounds since initial assessment accounting for  36.7% loss excess body weight.  Tolerating a soft  diet with minimal difficulty.  Protein intake is not adequate for post-op individual. Fluid consumption is not adequate. Patient is supplementing recommended vitamin/minerals. Pt states concerns/difficulties with early satiety and not meeting fluid and protein goals.  Advised to start measuring out portion and limit protein to 2 oz per meal.  Reminded to follow directions in the NG booklet recommend that she work on increasing fluid intake to 50 oz daily.   Discussed the transition diet. Reminded pt to eat slowly, chew thoroughly and to try on new food at a time.     Malnutrition Screening  Significant unintentional weight loss? n/a  Eating less than 75% of usual intake for more than 2 weeks? n/a    Nutrition Diagnosis:   1. Increased protein and nutrition needs related to altered GI function as evidenced by pt. s/p sleeve gastrectomy.  2. Food- and nutrition-related knowledge deficit related to lack of prior exposure to surgical weight loss information as evidenced by diet recall.     Nutrition Interventions:   1. Modify type and amount of food and nutrients within meals and snacks.  2. Comprehensive Nutrition Education  -Nutrition education materials: SG schedule      Recommendations:    1. Eat 60-70 g of protein per day.  Aim for 2 oz protein at each meal meal, 3x/day.  You can have 2 high protein snacks like cottage cheese, Greek yogurt, string cheese, turkey or beef jerky, tuna or chicken packets.  You can try protein bars like Quest, Pure Protein, or  Built Bars, Protein chips like Quest or Atkins.   2. Work on increasing fluid intake to at least 50 oz daily.   3. Continue no drinking 30 min before, during the meal and for 30 minutes after the meal  4. Increase intensity and duration of exercise. Try the [employer] cardio exercise videos for 20-30 min 3-4x/week.    5. Advance to transition diet. Try raw veggies, fresh fruit and lean ground beef.   6. Eat slowly,  chew thoroughly  7. Try one new food at a time.   8. Continue current vit/min regimen  9.         Attend monthly support groups    Nutrition Monitoring and Evaluation:   1-2 pounds weight loss per week  Criteria: weight check, food recall  Need for Follow-up: 3 months      Rox ORTIZ, [facility]  Bariatric Surgery Dietitian  Phone: [phone]  Fax: [phone]

## 2024-11-28 PROBLEM — K91.2 POSTOPERATIVE MALABSORPTION (HHS-HCC): Status: ACTIVE | Noted: 2024-11-28

## 2024-11-28 PROBLEM — Z90.3 S/P GASTRIC SLEEVE PROCEDURE: Status: ACTIVE | Noted: 2024-11-28

## 2024-11-28 NOTE — ASSESSMENT & PLAN NOTE
- continue to follow with sleep medicine as needed  - continue to wear CPAP or BiPAP if ordered at prescribed settings  - reviewed practices to promote adequate sleep hygiene

## 2024-11-28 NOTE — PROGRESS NOTES
BARIATRIC SURGERY CLINIC  Comprehensive Weight Management        Patient: Mariana Aguirre   MRN: [id number]     Index Surgery:  Date: 10/14/2024  Surgeon: Dr. Cervantes  Procedure: LSG    Virtual or Telephone Consent: An interactive audio and video telecommunication system which permits real time communications between the patient (at the originating site) and provider (at the distant site) was utilized to provide this telehealth service. Verbal consent was requested and obtained from Mariana Aguirre on this date, 11/29/24, for a telehealth visit.     HPI   Mariana Aguirre is a 54 y.o. female presenting for 6 week pov s/p LSG.     Diet:  - Stage: soft, advancing now to regular food diet  - Achieving protein and fluid goals most days: yes, about 50 oz fluid, working to increase.    Exercise:  - walking, increasing as tolerated    Concerns related to:  Nausea/Vomiting, Reflux: denies  Abdominal Pain: denies  Diarrhea/Constipation- bowel function is regular    Daily Supplements:  Calcium: Calcium Citrate w/ vitamin D (1200 - 1500mg)  Multivitamin & Minerals: 2 per day  Vitamin B12: 500 mcg/day   Vitamin D3: 3000 units   Iron/Other: iron supplement   PPI: taking    Primary Medical Hx:  Patient Active Problem List   Diagnosis    Anemia    Chronic gout of left wrist    GERD (gastroesophageal reflux disease)    Gout    Hypertension    Left ventricular hypertrophy    Rash of back    Right knee DJD    Obesity, morbid, BMI 40.0-49.9 (Multi)    Bariatric surgery status    LIEN (obstructive sleep apnea)    Morbid obesity due to excess calories (Multi)    Morbid (severe) obesity due to excess calories (Multi)    S/P gastric sleeve procedure    Postoperative malabsorption (HHS-HCC)      Past Surgical History:   Procedure Laterality Date    BARIATRIC SURGERY  10/14/2024    LAPAROSCOPIC GASTRIC SLEEVE / EGD / TAP BLOCK 52706 - CO LAPS [facility] RSTRICTIV PX LONGITUDINAL GASTRECTOMY (Dr. Cervantes @ [facility])    BREAST SURGERY      breast  reduction circa 2004    HERNIA REPAIR      age 9 months old      Social History     Socioeconomic History    Marital status:      Spouse name: Not on file    Number of children: Not on file    Years of education: Not on file    Highest education level: Not on file   Occupational History    Not on file   Tobacco Use    Smoking status: Never    Smokeless tobacco: Never   Vaping Use    Vaping status: Never Used   Substance and Sexual Activity    Alcohol use: Never    Drug use: Never    Sexual activity: Yes   Other Topics Concern    Not on file   Social History Narrative    Not on file     Social Drivers of Health     Financial Resource Strain: Low Risk  (10/14/2024)    Overall Financial Resource Strain (CARDIA)     Difficulty of Paying Living Expenses: Not hard at all   Food Insecurity: No Food Insecurity (10/14/2024)    Hunger Vital Sign     Worried About Running Out of Food in the Last Year: Never true     Ran Out of Food in the Last Year: Never true   Transportation Needs: No Transportation Needs (10/14/2024)    PRAPARE - Transportation     Lack of Transportation (Medical): No     Lack of Transportation (Non-Medical): No   Physical Activity: Not on file   Stress: Not on file   Social Connections: Not on file   Intimate Partner Violence: Not At Risk (10/14/2024)    Humiliation, Afraid, Rape, and Kick questionnaire     Fear of Current or Ex-Partner: No     Emotionally Abused: No     Physically Abused: No     Sexually Abused: No   Housing Stability: Low Risk  (10/14/2024)    Housing Stability Vital Sign     Unable to Pay for Housing in the Last Year: No     Number of Times Moved in the Last Year: 0     Homeless in the Last Year: No     Family History   Problem Relation Name Age of Onset    Hypertension Mother      Diabetes Mother      Hypertension Father      Sleep apnea Sister      Diabetes Sister      Cancer Maternal Grandmother        Current Outpatient Medications on File Prior to Visit   Medication Sig  "Dispense Refill    allopurinol (Zyloprim) 300 mg tablet TAKE 1 TABLET(300 MG) BY MOUTH EVERY DAY 90 tablet 1    CALCIUM CITRATE ORAL Take 1 tablet by mouth 3 times a day.      cyanocobalamin (Vitamin B-12) 500 mcg tablet Take 1 tablet (500 mcg) by mouth once daily.      metoprolol succinate XL (Toprol-XL) 100 mg 24 hr tablet Take 1 tablet (100 mg) by mouth once daily. 90 tablet 1    omeprazole (PriLOSEC) 40 mg DR capsule Take 1 capsule (40 mg) by mouth once daily in the morning. Take before meals. Do not crush or chew. Open capsule, sprinkle beads on SF jello, pudding or applesauce. 90 capsule 1    pediatric multivitamin (Children's Multivitamin) tablet,chewable chewable tablet Chew 1 tablet 2 times a day.      telmisartan (MIcarDIS) 80 mg tablet Take 1 tablet (80 mg) by mouth once daily. 90 tablet 1    terazosin (Hytrin) 10 mg capsule Take 1 capsule (10 mg) by mouth once daily at bedtime. Do not fill before October 16, 2024. 30 capsule 0    valACYclovir (Valtrex) 500 mg tablet Take 1 tablet (500 mg) by mouth once daily. 30 tablet 5    [DISCONTINUED] ondansetron ODT (Zofran-ODT) 4 mg disintegrating tablet Dissolve 1 tablet (4 mg) by mouth every 6 hours if needed for nausea or vomiting. 60 tablet 1     No current facility-administered medications on file prior to visit.     Allergies   Allergen Reactions    Codeine Other     Gatrointestinal Upset    Hydrochlorothiazide Other     Edema, gout    Adhesive Tape-Silicones Rash    Morphine Unknown      REVIEW OF SYSTEMS:  Negative unless otherwise reviewed in HPI.    PHYSICAL EXAM   Physical Exam   Ht: 5'3\"  Wt: 234 lbs BMI: 41.45  Alert, well appearing, no acute distress, nourished, hydrated.  Anicteric sclera, no ptosis  Facial symmetry  Neck supple  Unlabored respirations.  Easily conversant without increased respiratory effort  Oriented to person, place, time.  Judgement intact.  Appropriate mood, affect.     ASSESSMENT & PLAN  54 y.o. female presenting for 6 week pov " s/p LSG.     Weight Impression:  -Initial Weight: 290 lbs   -Today's Weight: 234 lbs   -%Total Weight Loss: -19.31 %     Problem List Items Addressed This Visit       GERD (gastroesophageal reflux disease)     - denies current s/sx gerd/heartburn/reflux  - continue PPI omeprazole taking as ordered qAM         Relevant Orders    CBC and Auto Differential    Comprehensive Metabolic Panel    Hypertension     Goal BP <120/80  Continue follow up with PCP for mgmt of antiHTN regimen  Encourage adherence to medications if prescribed for BP control  DASH/Mediterranean Diet lifestyle encouraged.  Weight reduction of 5-10% of clinical significance to improve BP control  Continues to work on lifestyle change goals to support blood pressure control and weight reduction.  Continue to follow up with your primary care physician         Relevant Orders    CBC and Auto Differential    Comprehensive Metabolic Panel    LIEN (obstructive sleep apnea)     - continue to follow with sleep medicine as needed  - continue to wear CPAP or BiPAP if ordered at prescribed settings  - reviewed practices to promote adequate sleep hygiene         Relevant Orders    CBC and Auto Differential    Comprehensive Metabolic Panel    S/P gastric sleeve procedure     Patient is doing well   six weeks s/p SG    no acute issues   takes supplements   does regular exercise  no pain or GERD   on PPI    Recs:     doing well  No acute issues   advised to continue Diet,   increase exercise,  FU with dietitian   continue vitamin supplementation, PPI   support groups  FU 6 weeks         Relevant Orders    CBC and Auto Differential    Comprehensive Metabolic Panel    Postoperative malabsorption (HHS-HCC) - Primary     Check micronutrient levels - see orders  Adjust micronutrient supplementation per results  Continue recommended post bariatric surgery supplements         Relevant Orders    Ferritin    Folate    Iron and TIBC    Vitamin B12    Vitamin D 25-Hydroxy,Total  (for eval of Vitamin D levels)   Please see Patient Instructions for today's relevant referrals, orders and instructions.       Follow Up: Follow up for 01/08/2024.     Leelee Santos, SHELLY-CNP

## 2024-11-29 ENCOUNTER — OFFICE VISIT (OUTPATIENT)
Dept: SURGERY | Facility: CLINIC | Age: 54
End: 2024-11-29
Payer: COMMERCIAL

## 2024-11-29 VITALS — WEIGHT: 234 LBS | HEIGHT: 63 IN | BODY MASS INDEX: 41.46 KG/M2

## 2024-11-29 DIAGNOSIS — Z90.3 S/P GASTRIC SLEEVE PROCEDURE: ICD-10-CM

## 2024-11-29 DIAGNOSIS — G47.33 OSA (OBSTRUCTIVE SLEEP APNEA): ICD-10-CM

## 2024-11-29 DIAGNOSIS — K91.2 POSTOPERATIVE MALABSORPTION (HHS-HCC): Primary | ICD-10-CM

## 2024-11-29 DIAGNOSIS — I10 HYPERTENSION, UNSPECIFIED TYPE: ICD-10-CM

## 2024-11-29 DIAGNOSIS — K21.9 GASTROESOPHAGEAL REFLUX DISEASE WITHOUT ESOPHAGITIS: ICD-10-CM

## 2024-11-29 PROCEDURE — 3008F BODY MASS INDEX DOCD: CPT

## 2024-11-29 PROCEDURE — 99211 OFF/OP EST MAY X REQ PHY/QHP: CPT | Mod: 95

## 2024-12-02 DIAGNOSIS — M1A.09X0 CHRONIC GOUT OF MULTIPLE SITES, UNSPECIFIED CAUSE: ICD-10-CM

## 2024-12-02 RX ORDER — ALLOPURINOL 300 MG/1
300 TABLET ORAL DAILY
Qty: 90 TABLET | Refills: 0 | Status: SHIPPED | OUTPATIENT
Start: 2024-12-02

## 2024-12-02 NOTE — TELEPHONE ENCOUNTER
MEDICATION PENDED  Rx Refill Request Telephone Encounter    Name:  Mariana Aguirre  :  [id number]    allopurinol (Zyloprim) 300 mg tablet [[id number]]    Order Details  Dose: 300 mg Route: oral Frequency: Daily   Dispense Quantity: 90 tablet Refills: 1          Sig: TAKE 1 TABLET(300 MG) BY MOUTH EVERY DAY     Specific Pharmacy location:    [facility] [id number] [place] [id number] [facility] [address] [address]  Phone: [phone]  Fax: [phone]  DENISE #: [id number]     Date of last appointment:  10/18/24  Date of next appointment:  24  Best number to reach patient:  [phone]

## 2024-12-03 NOTE — PROGRESS NOTES
Mariana is here for a Durolane injection into the right knee.     L Inj/Asp: R knee on 12/4/2024 10:00 AM  Indications: pain  Details: 22 G needle, lateral approach  Medications: 60 mg sodium hyaluronate 60 mg/3 mL  Outcome: tolerated well, no immediate complications  Procedure, treatment alternatives, risks and benefits explained, specific risks discussed. Consent was given by the patient. Immediately prior to procedure a time out was called to verify the correct patient, procedure, equipment, support staff and site/side marked as required. Patient was prepped and draped in the usual sterile fashion.         She will ice and work on range of motion and exercises as directed.    She did well with Zilretta injections previously and would like to pursue this in 2025 if her pain should recur.  We could submit for approval again at that time.    If doing well She will follow up on an as-needed basis.

## 2024-12-04 ENCOUNTER — OFFICE VISIT (OUTPATIENT)
Dept: ORTHOPEDIC SURGERY | Facility: CLINIC | Age: 54
End: 2024-12-04
Payer: COMMERCIAL

## 2024-12-04 DIAGNOSIS — M17.11 PRIMARY OSTEOARTHRITIS OF RIGHT KNEE: Primary | ICD-10-CM

## 2024-12-04 PROCEDURE — 99211 OFF/OP EST MAY X REQ PHY/QHP: CPT | Performed by: ORTHOPAEDIC SURGERY

## 2024-12-04 PROCEDURE — 20610 DRAIN/INJ JOINT/BURSA W/O US: CPT | Mod: RT | Performed by: ORTHOPAEDIC SURGERY

## 2024-12-04 PROCEDURE — 20610 DRAIN/INJ JOINT/BURSA W/O US: CPT | Performed by: ORTHOPAEDIC SURGERY

## 2024-12-04 PROCEDURE — 2500000004 HC RX 250 GENERAL PHARMACY W/ HCPCS (ALT 636 FOR OP/ED): Mod: JZ | Performed by: ORTHOPAEDIC SURGERY

## 2024-12-06 ENCOUNTER — APPOINTMENT (OUTPATIENT)
Dept: PRIMARY CARE | Facility: CLINIC | Age: 54
End: 2024-12-06
Payer: COMMERCIAL

## 2024-12-11 ENCOUNTER — APPOINTMENT (OUTPATIENT)
Dept: PRIMARY CARE | Facility: CLINIC | Age: 54
End: 2024-12-11
Payer: COMMERCIAL

## 2024-12-11 VITALS
HEART RATE: 66 BPM | BODY MASS INDEX: 41.46 KG/M2 | OXYGEN SATURATION: 97 % | TEMPERATURE: 97.3 F | RESPIRATION RATE: 16 BRPM | DIASTOLIC BLOOD PRESSURE: 80 MMHG | SYSTOLIC BLOOD PRESSURE: 156 MMHG | HEIGHT: 63 IN | WEIGHT: 234 LBS

## 2024-12-11 DIAGNOSIS — Z00.00 ROUTINE GENERAL MEDICAL EXAMINATION AT A HEALTH CARE FACILITY: Primary | ICD-10-CM

## 2024-12-11 DIAGNOSIS — I10 HYPERTENSION, UNSPECIFIED TYPE: ICD-10-CM

## 2024-12-11 DIAGNOSIS — I15.9 SECONDARY HYPERTENSION: ICD-10-CM

## 2024-12-11 DIAGNOSIS — E66.01 OBESITY, MORBID, BMI 40.0-49.9 (MULTI): ICD-10-CM

## 2024-12-11 DIAGNOSIS — Z12.31 SCREENING MAMMOGRAM FOR BREAST CANCER: ICD-10-CM

## 2024-12-11 DIAGNOSIS — Z98.84 BARIATRIC SURGERY STATUS: ICD-10-CM

## 2024-12-11 DIAGNOSIS — M1A.09X0 CHRONIC GOUT OF MULTIPLE SITES, UNSPECIFIED CAUSE: ICD-10-CM

## 2024-12-11 PROCEDURE — 3077F SYST BP >= 140 MM HG: CPT | Performed by: FAMILY MEDICINE

## 2024-12-11 PROCEDURE — 3079F DIAST BP 80-89 MM HG: CPT | Performed by: FAMILY MEDICINE

## 2024-12-11 PROCEDURE — 3008F BODY MASS INDEX DOCD: CPT | Performed by: FAMILY MEDICINE

## 2024-12-11 PROCEDURE — 99396 PREV VISIT EST AGE 40-64: CPT | Performed by: FAMILY MEDICINE

## 2024-12-11 PROCEDURE — 1036F TOBACCO NON-USER: CPT | Performed by: FAMILY MEDICINE

## 2024-12-11 RX ORDER — TERAZOSIN 10 MG/1
10 CAPSULE ORAL NIGHTLY
Qty: 90 CAPSULE | Refills: 1 | Status: SHIPPED | OUTPATIENT
Start: 2024-12-11 | End: 2025-06-09

## 2024-12-11 RX ORDER — TELMISARTAN 80 MG/1
80 TABLET ORAL DAILY
Qty: 90 TABLET | Refills: 1 | Status: SHIPPED | OUTPATIENT
Start: 2024-12-11

## 2024-12-11 RX ORDER — OMEPRAZOLE 40 MG/1
40 CAPSULE, DELAYED RELEASE ORAL
Qty: 90 CAPSULE | Refills: 1 | Status: SHIPPED | OUTPATIENT
Start: 2024-12-11 | End: 2025-06-09

## 2024-12-11 RX ORDER — CHLORTHALIDONE 25 MG/1
25 TABLET ORAL DAILY
Qty: 90 TABLET | Refills: 0 | Status: SHIPPED | OUTPATIENT
Start: 2024-12-11

## 2024-12-11 RX ORDER — ALLOPURINOL 300 MG/1
300 TABLET ORAL DAILY
Qty: 90 TABLET | Refills: 1 | Status: SHIPPED | OUTPATIENT
Start: 2024-12-11

## 2024-12-11 RX ORDER — METOPROLOL SUCCINATE 100 MG/1
100 TABLET, EXTENDED RELEASE ORAL DAILY
Qty: 90 TABLET | Refills: 1 | Status: SHIPPED | OUTPATIENT
Start: 2024-12-11

## 2024-12-11 RX ORDER — CHLORTHALIDONE 25 MG/1
25 TABLET ORAL DAILY
Qty: 30 TABLET | Refills: 1 | Status: SHIPPED | OUTPATIENT
Start: 2024-12-11 | End: 2024-12-11

## 2024-12-11 NOTE — PROGRESS NOTES
"Subjective   Patient ID: Mariana Aguirre is a 54 y.o. female who presents for Annual Exam and Hypertension.  HPI  Annual physical & HTN follow up   Eats a generally healthy diet   Exercises   Denies any chest pain,SOB  Does not check BP at home   Last eye apt 1 month ago  Last dental apt 1 yrs ago    Repeat /80.  Taking all the medications in the evening.  Never had a renal US done.     Pt is 2 months s/p bariatric surgery, healing well with 60% improvement.  Seeing Leelee MEYER for post-op management.  Taking omeprazole 40 mg in the evening  Denies heartburn    Pt confirms that her knee pain is improving with weight loss.  Continues to have severe pain in Achilles tendon.  Taking allopurinol 300 mg every day.      No other concern/question   Review of systems  ; Patient seen today for exam denies any problems with headaches or vision, denies any shortness of breath chest pain nausea or vomiting, no black stool no blood in the stool no heartburn type symptoms denies any problems with constipation or diarrhea, and no dysuria-type symptoms    The patient's allergies medications were reviewed with them today    The patient's social family and surgical history or also reviewed here today, along with her past medical history.     Objective     Alert and active in  no acute distress  HEENT TMs clear oropharynx negative nares clear no drainage noted neck supple  With no adenopathy   Heart regular rate and rhythm without murmur and no carotid bruits  Lungs- clear to auscultation bilaterally, no wheeze or rhonchi noted  Thyroid -negative masses or nodularity  Abdomen- soft times four quadrants , bowel sounds positive no masses or organomegaly, negative tenderness guarding or rebound  Neurological exam unremarkable- DTRs in upper and lower extremities within normal limits.   skin -no lesions noted      Pulse 66   Temp 36.3 °C (97.3 °F) (Temporal)   Resp 16   Ht 1.6 m (5' 3\")   Wt 106 kg (234 lb)   SpO2 97%   " BMI 41.45 kg/m²     Allergies   Allergen Reactions    Codeine Other     Gatrointestinal Upset    Hydrochlorothiazide Other     Edema, gout    Adhesive Tape-Silicones Rash    Morphine Unknown       Assessment/Plan   Problem List Items Addressed This Visit       Gout    Relevant Medications    allopurinol (Zyloprim) 300 mg tablet    Hypertension    Relevant Medications    terazosin (Hytrin) 10 mg capsule    telmisartan (MIcarDIS) 80 mg tablet    metoprolol succinate XL (Toprol-XL) 100 mg 24 hr tablet    Other Relevant Orders    Vascular US Renal Artery Duplex Post FEVAR    Obesity, morbid, BMI 40.0-49.9 (Multi)    Relevant Medications    omeprazole (PriLOSEC) 40 mg DR capsule    Bariatric surgery status    Relevant Medications    omeprazole (PriLOSEC) 40 mg DR capsule     Other Visit Diagnoses       Screening mammogram for breast cancer        Relevant Orders    BI mammo bilateral screening tomosynthesis          Discussed patient's BMI and to institute calorie reduction and increase exercise to decrease risk of diabetes and heart disease in the future.    Pt has been doing better now with significant improvement s/p LSG. She has been eating healthy and maintaining her weight. Her repeat BP today was 156/80.   Discussed the importance of adding diuretic to her medication regimen for better weight loss. Pt agrees to the plan.  Prescribed Hygroton today. Recommended to try for a couple of weeks, and she will let me know if she is tolerating the med well.   Maintain a low-salt diet   Recommended to take omeprazole in the morning    Refilled Hytrin, Telmisartan, omeprazole, Toprol XL, allopurinol.     Mammogram ordered.    Renal US ordered.  To rule out any type of renal artery stenosis    She had echo and cardiac workup or at [facility] get that workup    If anything worsens or changes please call us at once, follow up in the office as planned,       Scribe Attestation  By signing my name below, I, Ashly Vaughan  Yoandy Del Castillo   attest that this documentation has been prepared under the direction and in the presence of Anatoliy Good DO.

## 2025-01-04 ENCOUNTER — LAB (OUTPATIENT)
Dept: LAB | Facility: LAB | Age: 55
End: 2025-01-04
Payer: COMMERCIAL

## 2025-01-04 DIAGNOSIS — I10 HYPERTENSION, UNSPECIFIED TYPE: ICD-10-CM

## 2025-01-04 DIAGNOSIS — Z90.3 S/P GASTRIC SLEEVE PROCEDURE: ICD-10-CM

## 2025-01-04 DIAGNOSIS — K21.9 GASTROESOPHAGEAL REFLUX DISEASE WITHOUT ESOPHAGITIS: ICD-10-CM

## 2025-01-04 DIAGNOSIS — G47.33 OSA (OBSTRUCTIVE SLEEP APNEA): ICD-10-CM

## 2025-01-04 DIAGNOSIS — K91.2 POSTOPERATIVE MALABSORPTION (HHS-HCC): ICD-10-CM

## 2025-01-04 PROCEDURE — 82607 VITAMIN B-12: CPT

## 2025-01-04 PROCEDURE — 85025 COMPLETE CBC W/AUTO DIFF WBC: CPT

## 2025-01-04 PROCEDURE — 83550 IRON BINDING TEST: CPT

## 2025-01-04 PROCEDURE — 80053 COMPREHEN METABOLIC PANEL: CPT

## 2025-01-04 PROCEDURE — 82306 VITAMIN D 25 HYDROXY: CPT

## 2025-01-04 PROCEDURE — 82746 ASSAY OF FOLIC ACID SERUM: CPT

## 2025-01-04 PROCEDURE — 83540 ASSAY OF IRON: CPT

## 2025-01-04 PROCEDURE — 82728 ASSAY OF FERRITIN: CPT

## 2025-01-05 LAB
25(OH)D3 SERPL-MCNC: 23 NG/ML (ref 30–100)
ALBUMIN SERPL BCP-MCNC: 4.5 G/DL (ref 3.4–5)
ALP SERPL-CCNC: 100 U/L (ref 33–110)
ALT SERPL W P-5'-P-CCNC: 16 U/L (ref 7–45)
ANION GAP SERPL CALC-SCNC: 14 MMOL/L (ref 10–20)
AST SERPL W P-5'-P-CCNC: 19 U/L (ref 9–39)
BASOPHILS # BLD AUTO: 0.04 X10*3/UL (ref 0–0.1)
BASOPHILS NFR BLD AUTO: 0.8 %
BILIRUB SERPL-MCNC: 0.6 MG/DL (ref 0–1.2)
BUN SERPL-MCNC: 13 MG/DL (ref 6–23)
CALCIUM SERPL-MCNC: 10.1 MG/DL (ref 8.6–10.6)
CHLORIDE SERPL-SCNC: 105 MMOL/L (ref 98–107)
CO2 SERPL-SCNC: 28 MMOL/L (ref 21–32)
CREAT SERPL-MCNC: 1 MG/DL (ref 0.5–1.05)
EGFRCR SERPLBLD CKD-EPI 2021: 68 ML/MIN/1.73M*2
EOSINOPHIL # BLD AUTO: 0.13 X10*3/UL (ref 0–0.7)
EOSINOPHIL NFR BLD AUTO: 2.7 %
ERYTHROCYTE [DISTWIDTH] IN BLOOD BY AUTOMATED COUNT: 17.1 % (ref 11.5–14.5)
FERRITIN SERPL-MCNC: 53 NG/ML (ref 8–150)
FOLATE SERPL-MCNC: 8.8 NG/ML
GLUCOSE SERPL-MCNC: 106 MG/DL (ref 74–99)
HCT VFR BLD AUTO: 36.6 % (ref 36–46)
HGB BLD-MCNC: 11.4 G/DL (ref 12–16)
IMM GRANULOCYTES # BLD AUTO: 0.01 X10*3/UL (ref 0–0.7)
IMM GRANULOCYTES NFR BLD AUTO: 0.2 % (ref 0–0.9)
IRON SATN MFR SERPL: 11 % (ref 25–45)
IRON SERPL-MCNC: 39 UG/DL (ref 35–150)
LYMPHOCYTES # BLD AUTO: 1.99 X10*3/UL (ref 1.2–4.8)
LYMPHOCYTES NFR BLD AUTO: 41 %
MCH RBC QN AUTO: 26 PG (ref 26–34)
MCHC RBC AUTO-ENTMCNC: 31.1 G/DL (ref 32–36)
MCV RBC AUTO: 84 FL (ref 80–100)
MONOCYTES # BLD AUTO: 0.39 X10*3/UL (ref 0.1–1)
MONOCYTES NFR BLD AUTO: 8 %
NEUTROPHILS # BLD AUTO: 2.29 X10*3/UL (ref 1.2–7.7)
NEUTROPHILS NFR BLD AUTO: 47.3 %
NRBC BLD-RTO: 0 /100 WBCS (ref 0–0)
PLATELET # BLD AUTO: 180 X10*3/UL (ref 150–450)
POTASSIUM SERPL-SCNC: 4 MMOL/L (ref 3.5–5.3)
PROT SERPL-MCNC: 7.3 G/DL (ref 6.4–8.2)
RBC # BLD AUTO: 4.38 X10*6/UL (ref 4–5.2)
SODIUM SERPL-SCNC: 143 MMOL/L (ref 136–145)
TIBC SERPL-MCNC: 361 UG/DL (ref 240–445)
UIBC SERPL-MCNC: 322 UG/DL (ref 110–370)
VIT B12 SERPL-MCNC: 339 PG/ML (ref 211–911)
WBC # BLD AUTO: 4.9 X10*3/UL (ref 4.4–11.3)

## 2025-01-06 RX ORDER — ASCORBIC ACID 250 MG
250 TABLET ORAL 2 TIMES DAILY
Qty: 180 TABLET | Refills: 0 | Status: SHIPPED | OUTPATIENT
Start: 2025-01-06 | End: 2025-04-06

## 2025-01-06 RX ORDER — FERROUS SULFATE 325(65) MG
325 TABLET, DELAYED RELEASE (ENTERIC COATED) ORAL
Qty: 180 TABLET | Refills: 0 | Status: SHIPPED | OUTPATIENT
Start: 2025-01-06 | End: 2025-04-06

## 2025-01-06 RX ORDER — ERGOCALCIFEROL 1.25 MG/1
50000 CAPSULE ORAL WEEKLY
Qty: 12 CAPSULE | Refills: 0 | Status: SHIPPED | OUTPATIENT
Start: 2025-01-06 | End: 2025-03-25

## 2025-01-07 NOTE — PATIENT INSTRUCTIONS
"The following are the recommendations we discussed at your appointment today:  1. Nutrition  - Please make sure you are seeing the bariatric dietitian regularly.    Dietitian Information:   Negrito Gramajo: [phone]  [facility] [place] [phone]    Your Protein Goals will gradually increase as you get further out from surgery:  0-3 months - 60 GRAMS PER DAY  3-6 months - 60-75 GRAMS PER DAY  6-12 months - 75-90 GRAMS PER DAY  12+ months - 80-90 GRAMS PER DAY    - Follow Pouch Rules;.   Stop drinking 30 minutes before your meals, Take 30 minutes to eat your meal   - NO DRINKING DURING MEALS, Wait for 30 minutes after your meal to drink  - Eat 5 servings of fruits and veggies daily.  A serving is 1 small (tennis ball size) piece of whole fruit, 1/2 cup fresh fruit, 1 cup non starchy vegetables  - Eat 3 small meals and 1-2 healthy, protein rich, snacks per day.    EAT PROTEIN FIRST AT MEALS, 2nd non starchy vegetable or fruit serving, consume starches last and aim for whole grains of small portion.  This pattern of eating ensures you are getting full on high quality protein and higher fiber foods with many vitamins and minerals to support adequate nutrition first.      2. Exercise Recommendations:    Regular physical activity is CRITICAL for long term successful weight management.    Strive for a minimum weekly exercise goal of at least 200 minutes per week of aerobic exercise (45 minutes at least 5 days per week or 30 minutes daily)    Strength based resistance training is critical for helping to maintain and build lean body mass/muscle mass which is very important for long term health.  Having higher muscle mass is associated with better health outcomes and can help to maintain higher calorie expenditure.      Designing a Strength Program:  Select 3-4 exercises that target different major muscle groups.  - Emphasize the following movements \"pull, push, squat, hip hinge\"  \"Pull\" examples - pull up, " "bent over row or dumbbell row, pull down with weight bar or resistance band  \"Push\" examples - push up, bench press, chest flys, dips, overhead press, dumbbell bench press  \"Squat\" examples - air squat, chair squat, lunge steps, goblet squat, front squat, etc  \"Hip hinge\" examples - kettle bell swing, good morning, glute bridge, deadlift, suitcase pick ups, hip thrust    Perform two to three sets of eight to 15 repetitions of each exercise.  Try to use a resistance that feels like an \"8 out of 10\" effort, with 10 being the highest effort you can give.    To get stronger, try increasing either the weight, number of repetitions, number of sets or number of exercises every 4-8 weeks as you feel more comfortable with your current program.      3. Fluids  - Drink at least 60 oz of water every day.   - Wait 30 minutes before or after meals to drink fluids.  - Avoid carbonated beverages.    4. Vitamins  - Remember to take your multivitamins 2 times daily, once in the morning and once in the evening  - Take your calcium 2-3 times daily, at least 2 hours apart from the multivitamin - total daily dose at least 1200-1500mg per day.    - Vitamin D3 3000 units per day  - B12 - depending on your blood work and how well you absorb B12 from your multivitamin you may need additional B12 of 350-500mcg oral per day.    5. Labs  - We will check labs today and results will be communicated via [facility] My Chart  - A copy of the results can be viewed on  My Chart.      Follow-up with Dietitian for bariatric diet optimization  Follow-up with PCP for general health questions and ongoing management of routine health concerns      "

## 2025-01-07 NOTE — ASSESSMENT & PLAN NOTE
Patient is doing well   3 months s/p SG    no acute issues   takes supplements   does regular exercise  no pain or GERD   on PPI     Recs:      doing well  No acute issues   advised to continue Diet,   increase exercise,  FU with dietitian   continue vitamin supplementation, PPI   support groups  FU 3 months

## 2025-01-07 NOTE — PROGRESS NOTES
BARIATRIC SURGERY CLINIC  Comprehensive Weight Management        Patient: Mariana Agurire   MRN: [id number]     Index Surgery:  Date: 10/14/2024  Surgeon: Dr. Cervantes  Procedure: LSG    Virtual or Telephone Consent: An interactive audio and video telecommunication system which permits real time communications between the patient (at the originating site) and provider (at the distant site) was utilized to provide this telehealth service. Verbal consent was requested and obtained from Mariana Aguirre on this date, 01/08/25, for a telehealth visit.     HPI   Mariana Aguirre is a 54 y.o. female presenting for 3 month pov s/p LSG.     Diet:  - Stage: regular food diet  - Achieving protein and fluid goals most days: yes, increased fluids    Exercise:  - walking, increasing as tolerated    Concerns related to:  Nausea/Vomiting, Reflux: denies  Abdominal Pain: denies  Diarrhea/Constipation- bowel function is regular, prn stool softener    Daily Supplements:  Calcium: Calcium Citrate w/ vitamin D (1200 - 1500mg)  Multivitamin & Minerals: 2 per day  Vitamin B12: 500 mcg/day   Vitamin D3: 3000 units   Iron/Other: iron supplement   PPI: taking    Primary Medical Hx:  Patient Active Problem List   Diagnosis    Anemia    Chronic gout of left wrist    GERD (gastroesophageal reflux disease)    Gout    Hypertension    Left ventricular hypertrophy    Rash of back    Right knee DJD    Obesity, morbid, BMI 40.0-49.9 (Multi)    Bariatric surgery status    LIEN (obstructive sleep apnea)    Morbid obesity due to excess calories (Multi)    Morbid (severe) obesity due to excess calories (Multi)    S/P gastric sleeve procedure    Postoperative malabsorption (HHS-HCC)      Past Surgical History:   Procedure Laterality Date    BARIATRIC SURGERY  10/14/2024    LAPAROSCOPIC GASTRIC SLEEVE / EGD / TAP BLOCK 46779 - DC LAPS GSTRC RSTRICTIV PX LONGITUDINAL GASTRECTOMY (Dr. Cervantes @ [facility])    BREAST SURGERY      breast reduction circa 2004    HERNIA  REPAIR      age 9 months old      Social History     Socioeconomic History    Marital status:      Spouse name: Not on file    Number of children: Not on file    Years of education: Not on file    Highest education level: Not on file   Occupational History    Not on file   Tobacco Use    Smoking status: Never    Smokeless tobacco: Never   Vaping Use    Vaping status: Never Used   Substance and Sexual Activity    Alcohol use: Never    Drug use: Never    Sexual activity: Yes   Other Topics Concern    Not on file   Social History Narrative    Not on file     Social Drivers of Health     Financial Resource Strain: Low Risk  (10/14/2024)    Overall Financial Resource Strain (CARDIA)     Difficulty of Paying Living Expenses: Not hard at all   Food Insecurity: No Food Insecurity (10/14/2024)    Hunger Vital Sign     Worried About Running Out of Food in the Last Year: Never true     Ran Out of Food in the Last Year: Never true   Transportation Needs: No Transportation Needs (10/14/2024)    PRAPARE - Transportation     Lack of Transportation (Medical): No     Lack of Transportation (Non-Medical): No   Physical Activity: Not on file   Stress: Not on file   Social Connections: Not on file   Intimate Partner Violence: Not At Risk (10/14/2024)    Humiliation, Afraid, Rape, and Kick questionnaire     Fear of Current or Ex-Partner: No     Emotionally Abused: No     Physically Abused: No     Sexually Abused: No   Housing Stability: Low Risk  (10/14/2024)    Housing Stability Vital Sign     Unable to Pay for Housing in the Last Year: No     Number of Times Moved in the Last Year: 0     Homeless in the Last Year: No     Family History   Problem Relation Name Age of Onset    Hypertension Mother      Diabetes Mother      Hypertension Father      Sleep apnea Sister      Diabetes Sister      Cancer Maternal Grandmother        Current Outpatient Medications on File Prior to Visit   Medication Sig Dispense Refill    allopurinol  "(Zyloprim) 300 mg tablet Take 1 tablet (300 mg) by mouth once daily. 90 tablet 1    ascorbic acid (Vitamin C) 250 mg tablet Take 1 tablet (250 mg) by mouth 2 times a day. With iron. 180 tablet 0    CALCIUM CITRATE ORAL Take 1 tablet by mouth 3 times a day.      chlorthalidone (Hygroton) 25 mg tablet TAKE 1 TABLET(25 MG) BY MOUTH DAILY 90 tablet 0    cyanocobalamin (Vitamin B-12) 500 mcg tablet Take 1 tablet (500 mcg) by mouth once daily.      ergocalciferol (DrisdoL) 1.25 MG (07911 UT) capsule Take 1 capsule (50,000 Units) by mouth 1 (one) time per week for 12 doses. 12 capsule 0    ferrous sulfate 325 (65 Fe) MG EC tablet Take 1 tablet by mouth 2 times a day before meals. Do not crush, chew, or split. 180 tablet 0    metoprolol succinate XL (Toprol-XL) 100 mg 24 hr tablet Take 1 tablet (100 mg) by mouth once daily. 90 tablet 1    omeprazole (PriLOSEC) 40 mg DR capsule Take 1 capsule (40 mg) by mouth once daily in the morning. Take before meals. Do not crush or chew. Open capsule, sprinkle beads on SF jello, pudding or applesauce. 90 capsule 1    pediatric multivitamin (Children's Multivitamin) tablet,chewable chewable tablet Chew 1 tablet 2 times a day.      telmisartan (MIcarDIS) 80 mg tablet Take 1 tablet (80 mg) by mouth once daily. 90 tablet 1    terazosin (Hytrin) 10 mg capsule Take 1 capsule (10 mg) by mouth once daily at bedtime. 90 capsule 1    valACYclovir (Valtrex) 500 mg tablet Take 1 tablet (500 mg) by mouth once daily. 30 tablet 5     No current facility-administered medications on file prior to visit.     Allergies   Allergen Reactions    Codeine Other     Gatrointestinal Upset    Hydrochlorothiazide Other     Edema, gout    Adhesive Tape-Silicones Rash    Morphine Unknown      REVIEW OF SYSTEMS:  Negative unless otherwise reviewed in HPI.    PHYSICAL EXAM   Physical Exam   Ht: 5'3\"  Wt: 217 lbs   BMI: 38.44  Alert, well appearing, no acute distress, nourished, hydrated.  Anicteric sclera, no " ptosis  Facial symmetry  Neck supple  Unlabored respirations.  Easily conversant without increased respiratory effort  Oriented to person, place, time.  Judgement intact.  Appropriate mood, affect.     ASSESSMENT & PLAN  54 y.o. female presenting for 3 mo pov s/p LSG.     Weight Impression:  -Initial Weight: 290 lbs   -Today's Weight: 217 lbs   -%Total Weight Loss: -25.17 %     Problem List Items Addressed This Visit       GERD (gastroesophageal reflux disease)     - denies current s/sx gerd/heartburn/reflux  - continue PPI omeprazole taking as ordered qAM         Relevant Orders    CBC and Auto Differential    Comprehensive Metabolic Panel    Hypertension     Goal BP <120/80  Continue follow up with PCP for mgmt of antiHTN regimen  Encourage adherence to medications if prescribed for BP control  DASH/Mediterranean Diet lifestyle encouraged.  Weight reduction of 5-10% of clinical significance to improve BP control  Continues to work on lifestyle change goals to support blood pressure control and weight reduction.  Continue to follow up with your primary care physician         Relevant Orders    CBC and Auto Differential    Comprehensive Metabolic Panel    LIEN (obstructive sleep apnea)     - continue to follow with sleep medicine as needed  - continue to wear CPAP or BiPAP if ordered at prescribed settings  - reviewed practices to promote adequate sleep hygiene         Relevant Orders    CBC and Auto Differential    Comprehensive Metabolic Panel    S/P gastric sleeve procedure     Patient is doing well   3 months s/p SG    no acute issues   takes supplements   does regular exercise  no pain or GERD   on PPI     Recs:      doing well  No acute issues   advised to continue Diet,   increase exercise,  FU with dietitian   continue vitamin supplementation, PPI   support groups  FU 3 months         Relevant Orders    CBC and Auto Differential    Comprehensive Metabolic Panel    Postoperative malabsorption (HHS-HCC) -  Primary     Check micronutrient levels - see orders  Adjust micronutrient supplementation per results  Continue recommended post bariatric surgery supplements         Relevant Orders    Ferritin    Folate    Iron and TIBC    Vitamin B12    Vitamin D 25-Hydroxy,Total (for eval of Vitamin D levels)   Please see Patient Instructions for today's relevant referrals, orders and instructions.       Follow Up: Follow up in about 3 months (around 4/8/2025).     Leelee Santos, APRN-CNP

## 2025-01-08 ENCOUNTER — OFFICE VISIT (OUTPATIENT)
Dept: SURGERY | Facility: CLINIC | Age: 55
End: 2025-01-08
Payer: COMMERCIAL

## 2025-01-08 ENCOUNTER — NUTRITION (OUTPATIENT)
Dept: SURGERY | Facility: CLINIC | Age: 55
End: 2025-01-08
Payer: COMMERCIAL

## 2025-01-08 VITALS — BODY MASS INDEX: 38.45 KG/M2 | HEIGHT: 63 IN | WEIGHT: 217 LBS

## 2025-01-08 DIAGNOSIS — Z90.3 S/P GASTRIC SLEEVE PROCEDURE: ICD-10-CM

## 2025-01-08 DIAGNOSIS — G47.33 OSA (OBSTRUCTIVE SLEEP APNEA): ICD-10-CM

## 2025-01-08 DIAGNOSIS — K21.9 GASTROESOPHAGEAL REFLUX DISEASE WITHOUT ESOPHAGITIS: ICD-10-CM

## 2025-01-08 DIAGNOSIS — I10 HYPERTENSION, UNSPECIFIED TYPE: ICD-10-CM

## 2025-01-08 DIAGNOSIS — K91.2 POSTOPERATIVE MALABSORPTION (HHS-HCC): Primary | ICD-10-CM

## 2025-01-08 PROCEDURE — 99211 OFF/OP EST MAY X REQ PHY/QHP: CPT | Mod: 95

## 2025-01-08 PROCEDURE — 3008F BODY MASS INDEX DOCD: CPT

## 2025-01-08 NOTE — PROGRESS NOTES
Surgery Date:  10/16/24  Surgeon:  Helen  Procedure:  sleeve gastrectomy    ASSESSMENT:  Current weight pounds:      217.0             Ht: 63.0 in  BMI:  38.4  Previous weight pounds:    235.0   11/27/24  Initial start weight pounds:   290.0   1/5/24 at   [facility] pounds:  150.0  Total weight change pounds:  73.0  %EBW Lost: 48.6%    PROGRESS:  Nutrition Interventions for last encounter (date):          Eat 60-70 g of protein per day.  Aim for 2 oz protein at each meal meal, 3x/day.  You can have 2 high protein snacks like cottage cheese, Greek yogurt, string cheese, turkey or beef jerky, tuna or chicken packets.  You can try protein bars like Quest, Pure Protein, or  Built Bars, Protein chips like Quest or Atkins.   2.          Work on increasing fluid intake to at least 50 oz daily.   3.          Continue no drinking 30 min before, during the meal and for 30 minutes after the meal  4.          Increase intensity and duration of exercise. Try the chair cardio exercise videos for 20-30 min 3-4x/week.    5.          Advance to transition diet. Try raw veggies, fresh fruit and lean ground beef.   6.          Eat slowly, chew thoroughly  7.          Try one new food at a time.   8.           Continue current vit/min regimen  9.         Attend monthly support groups     CHANGES IN TREATMENT:   Patient met goals:    Partially   24 hour food recall:   Breakfast:  protein bar [employer]  10 g  Snack:    yogurt or cheese  sticks     Lunch:  chicken wrap (lettuce cheese, ranch dressing, chicken) at the inside  14 g  Snack:   Nutrigrain bar    Dinner:   [facility] Bowl  2 oz chicken,  1/4 c mashed potatoes,  1/8 c corn   14 g  Snack:   none  Beverages:    >50 oz water/day  Alcohol: none      Vitamins:   2 Equate tablets,  1500 mg calcium citrate, B12  Physical Activity:  chair yoga for 20 min 3x/week, works out with 10 kettle bell    READINESS TO LEARN:  Motivation to learn:           Interested      Understanding of instruction:  Good     Anticipated Compliance: Good       Family Support: Unable to assess-family not present     Patient presents with post-op weight loss surgery sleeve gastrectomy. Pt is 3 months post op.   Patient has lost 73.0  pounds since initial assessment accounting for 48.6% loss excess body weight.  Tolerating a regular diet without difficulty.  Protein intake is not adequate for post-op individual. Fluid consumption is adequate. Patient is supplementing recommended vitamin/minerals. Pt states concerns/difficulties with  meat/chicken feeling to heavy in her stomach.  However, she tolerates other protein rich foods like fish, eggs, dairy.    Suggested that she try chicken thighs or dark meat. Discussed ways to increase protein intake with food. Advised to read over food labels to determine calorie, carb/sugar, and protein content before purchasing.      Malnutrition Screening  Significant unintentional weight loss? n/a  Eating less than 75% of usual intake for more than 2 weeks? n/a     Nutrition Diagnosis:   1. Increased protein and nutrition needs related to altered GI function as evidenced by pt. s/p sleeve gastrectomy.  2. Food- and nutrition-related knowledge deficit related to lack of prior exposure to surgical weight loss information as evidenced by diet recall.     Nutrition Interventions:   1. Modify type and amount of food and nutrients within meals and snacks.  2. Comprehensive Nutrition Education  -Nutrition education materials: SG schedule         Recommendations:    1. Eat at least 80 g of protein per day. Aim for 2 oz protein at each meal meal, 3x/day.  You can have 2 high protein snacks like cottage cheese, Greek yogurt, string cheese, turkey or beef jerky, tuna or chicken packets.  You can try protein bars like Quest, Pure Protein, or  Built Bars, Protein chips like Quest or Atkins.   2. Continue to drink at least 50 oz. of zero calorie beverages per day  3. Continue no drinking 30 min before, during  the meal and for 30 minutes after the meal  4. Increase intensity and duration of exercise. Try the Kettle bell exercise video links sent to your email.   5. Continue current vit/min regimen  6.         Attend monthly support group    Nutrition Monitoring and Evaluation:   1-2 pounds weight loss per week  Criteria: weight check, food recall  Need for Follow-up: 6 month postop       Rox ORTIZ, [facility]  Bariatric Surgery Dietitian  Phone: [phone]  Fax: [phone]

## 2025-01-09 ENCOUNTER — APPOINTMENT (OUTPATIENT)
Dept: RADIOLOGY | Facility: CLINIC | Age: 55
End: 2025-01-09
Payer: COMMERCIAL

## 2025-01-11 ENCOUNTER — APPOINTMENT (OUTPATIENT)
Dept: CARDIOLOGY | Facility: HOSPITAL | Age: 55
End: 2025-01-11
Payer: COMMERCIAL

## 2025-01-13 ENCOUNTER — APPOINTMENT (OUTPATIENT)
Dept: CARDIOLOGY | Facility: HOSPITAL | Age: 55
End: 2025-01-13
Payer: COMMERCIAL

## 2025-03-04 NOTE — PROGRESS NOTES
History: Mariana is here for her right shoulder. She is an established patient.     Past medical history: Multiple  Medications: Multiple  Allergies: No known drug allergies    Please refer to the intake H&P regarding the patient's review of systems, family history and social history as was done today    HEENT: Normal  Lungs: Clear to auscultation  Heart: RRR  Abdomen: Soft, nontender  Skin: clear  Extremity: []   Contralateral exam is normal for strength, motion, stability and neurovascular assessment.    Radiographs: []     Assessment: []     Plan: []   All questions were answered today with the patient.    Scribe Attestation  By signing my name below, ICesilia Scribe   attest that this documentation has been prepared under the direction and in the presence of Mauricio Bowser MD.

## 2025-03-05 ENCOUNTER — APPOINTMENT (OUTPATIENT)
Dept: ORTHOPEDIC SURGERY | Facility: CLINIC | Age: 55
End: 2025-03-05
Payer: COMMERCIAL

## 2025-03-17 ENCOUNTER — PATIENT MESSAGE (OUTPATIENT)
Dept: SURGERY | Facility: CLINIC | Age: 55
End: 2025-03-17
Payer: COMMERCIAL

## 2025-03-17 DIAGNOSIS — E66.01 OBESITY, MORBID, BMI 40.0-49.9 (MULTI): ICD-10-CM

## 2025-03-17 DIAGNOSIS — Z98.84 BARIATRIC SURGERY STATUS: ICD-10-CM

## 2025-03-17 RX ORDER — OMEPRAZOLE 40 MG/1
40 CAPSULE, DELAYED RELEASE ORAL
Qty: 90 CAPSULE | Refills: 1 | Status: SHIPPED | OUTPATIENT
Start: 2025-03-17 | End: 2025-09-13

## 2025-04-08 ENCOUNTER — OFFICE VISIT (OUTPATIENT)
Age: 55
End: 2025-04-08
Payer: COMMERCIAL

## 2025-04-08 ENCOUNTER — APPOINTMENT (OUTPATIENT)
Dept: SURGERY | Facility: CLINIC | Age: 55
End: 2025-04-08
Payer: COMMERCIAL

## 2025-04-08 VITALS — WEIGHT: 211 LBS | HEIGHT: 66 IN | BODY MASS INDEX: 33.91 KG/M2 | TEMPERATURE: 97.5 F

## 2025-04-08 DIAGNOSIS — M25.561 CHRONIC PAIN OF RIGHT KNEE: Primary | ICD-10-CM

## 2025-04-08 DIAGNOSIS — G89.29 CHRONIC PAIN OF RIGHT KNEE: Primary | ICD-10-CM

## 2025-04-08 PROCEDURE — 99204 OFFICE O/P NEW MOD 45 MIN: CPT | Performed by: STUDENT IN AN ORGANIZED HEALTH CARE EDUCATION/TRAINING PROGRAM

## 2025-04-08 RX ORDER — OMEPRAZOLE 40 MG/1
40 CAPSULE, DELAYED RELEASE ORAL
COMMUNITY
Start: 2025-03-17 | End: 2025-09-13

## 2025-04-08 NOTE — PROGRESS NOTES
HPI  Onset: several years  Location: Right Knee  Quality: aching, dull, stabbing, and sharp  Patient states that her pain is at a 5 at rest and a 8 with activity on the pain scale.   The pain is present: Constantly  and consistent  The pain is exacerbated by: Walking and Weather and alleviated by:  nothing .  The patient states the pain interferes with her  ADL's : Yes Transferring , Ambulating , and Sleeping  Recent falls: no  Bladder bowel dysfunction:no  She is taking the following medications for pain:   None  Prior treatments tried for chief complaint listed above: Injections - cortisone and gel  Surgery: no  Physical Therapy: no.  Chiropractor: yes  Acupuncture: no  Massage Therapy: no   Behavior/Wellness/Psychological support: yes  Expectations of Treatment at the Pain Center: The patient presents today for evaluation with the expectation that they will be able to perform their ADL's without excruciating pain.   Patient denies the following symptoms: Ataxia, saddle anesthesia, nausea, fever, vomiting, or recent antibiotics.       
includes:  Medication: acetaminophen, Physical Therapy:unable to tolerate formal PT at this time, does home exercises provided from Orthopedics from OSH routinely, Continues clinician directed home exercise program including exercises learned in PT, and Cognitive and behavioral issues in pain discussed and addressed with the patient.      Thank you for this most interesting referral. Please do not hesitate to contact Dr. Dalton Angel, DO at  with any questions or concerns.    Sincerely,    Dalton Angel, DO

## 2025-04-11 ENCOUNTER — TELEPHONE (OUTPATIENT)
Age: 55
End: 2025-04-11

## 2025-04-11 NOTE — TELEPHONE ENCOUNTER
RIGHT KNEE GENICULAR KNEE INJECTION NOT COVERED   DENIED FORM ALMA DELIA  [id number] AA&/STRD GENICULAR NRV BRANCHES W/IMG  Right     Non-Authorized           Criteria Not Met             Clinical Rationale:  Your doctor told us that you have knee pain. They want to do a procedure to decrease your knee pain. The procedure involves treating the nerve to your knee. This procedure might be performed with numbing medicine. It can also be performed with steroids. Procedures are needed when they help with improving health outcomes. There is no proof or not enough proof that this procedure improves health outcomes. For this reason, this request is denied as not medically necessary. We used Health Plan Policy (SURG.59179) titled Genicular Procedures for Treatment of Knee Pain to make this decision.

## 2025-04-22 ENCOUNTER — OFFICE VISIT (OUTPATIENT)
Age: 55
End: 2025-04-22
Payer: COMMERCIAL

## 2025-04-22 VITALS — TEMPERATURE: 96.8 F | BODY MASS INDEX: 32.47 KG/M2 | WEIGHT: 202 LBS | HEIGHT: 66 IN

## 2025-04-22 DIAGNOSIS — G89.29 CHRONIC PAIN OF RIGHT KNEE: Primary | ICD-10-CM

## 2025-04-22 DIAGNOSIS — M25.561 CHRONIC PAIN OF RIGHT KNEE: Primary | ICD-10-CM

## 2025-04-22 PROCEDURE — 99214 OFFICE O/P EST MOD 30 MIN: CPT | Performed by: STUDENT IN AN ORGANIZED HEALTH CARE EDUCATION/TRAINING PROGRAM

## 2025-04-22 RX ORDER — DULOXETIN HYDROCHLORIDE 30 MG/1
30 CAPSULE, DELAYED RELEASE ORAL DAILY
Qty: 30 CAPSULE | Refills: 3 | Status: SHIPPED | OUTPATIENT
Start: 2025-04-22

## 2025-04-22 RX ORDER — BACLOFEN 5 MG/1
5 TABLET ORAL 3 TIMES DAILY PRN
Qty: 90 TABLET | Refills: 1 | Status: SHIPPED | OUTPATIENT
Start: 2025-04-22

## 2025-04-22 NOTE — PROGRESS NOTES
HPI  Location: Knee Pain (Right)   Patient states that her pain is at a 5 at rest and a 8 with activity on the pain scale.   
01/03/14     DR FORD    HERNIA REPAIR       PMH, Surgical Hx, Family Hx, and Social Hx reviewed and updated.  Health Maintenance reviewed.    Reviewed with the patient: current clinical status, medications, activities and diet.     Side effects, adverse effects of the medication prescribed today, as well as treatment plan/ rationale and result expectations have been discussed with the patient who expresses understanding and desires to proceed.  Close follow up to evaluate treatment results and for coordination of care.    I have reviewed the patient's medical history in detail and updated the computerized patient record.  Objective     Vitals:    04/22/25 0946   Temp: 96.8 °F (36 °C)   Weight: 91.6 kg (202 lb)   Height: 1.676 m (5' 6\")     Body mass index is 32.6 kg/m².        Dalton Angel, DO

## 2025-04-22 NOTE — ASSESSMENT & PLAN NOTE
-unfortunately, right genicular nerve block denied by insurance and unable to appeal  -will plan for right saphenous nerve peripheral nerve stimulation with [facility] for chronic right knee pain  -begin baclofen 5mg TID prn  -begin Duloxetine 30mg daily

## 2025-06-13 ENCOUNTER — APPOINTMENT (OUTPATIENT)
Dept: PRIMARY CARE | Facility: CLINIC | Age: 55
End: 2025-06-13
Payer: COMMERCIAL

## 2025-06-13 NOTE — PROGRESS NOTES
Subjective   Patient ID: Mariana Aguirre is a 54 y.o. female who presents for No chief complaint on file..  HPI    HTN follow up  Denies chest pain,SOB, swelling, headaches, lightheadedness or dizziness.   Eats a generally healthy diet, Exercises.   Does check BP at home.   Medication working well.          Review of systems  ; Patient seen today for exam denies any problems with headaches or vision, denies any shortness of breath chest pain nausea or vomiting, no black stool no blood in the stool no heartburn type symptoms denies any problems with constipation or diarrhea, and no dysuria-type symptoms    The patient's allergies medications were reviewed with them today    The patient's social family and surgical history or also reviewed here today, along with her past medical history.     Objective     Alert and active in  no acute distress  HEENT TMs clear oropharynx negative nares clear no drainage noted neck supple  With no adenopathy   Heart regular rate and rhythm without murmur and no carotid bruits  Lungs- clear to auscultation bilaterally, no wheeze or rhonchi noted  Thyroid -negative masses or nodularity  Abdomen- soft times four quadrants , bowel sounds positive no masses or organomegaly, negative tenderness guarding or rebound    skin -no lesions noted      There were no vitals taken for this visit.        Assessment/Plan   Problem List Items Addressed This Visit    None        If anything worsens or changes please call us at once, follow up in the office as planned,       All medical record entries made by the Scribe were at my direction and personally dictated by me.   I have reviewed the chart and agree that the record accurately reflects my personal performance of the history, physical exam, discussion, and plan.   Relevant Medications    terazosin (Hytrin) 10 mg capsule    telmisartan (MIcarDIS) 80 mg tablet    metoprolol succinate XL (Toprol-XL) 50 mg 24 hr tablet     Other Visit Diagnoses         BMI 32.0-32.9,adult          Class 1 obesity due to excess calories without serious comorbidity with body mass index (BMI) of 32.0 to 32.9 in adult                Patient's BP today 132/84. She is losing weight with healthy diet and and lifestyle regimen following her bariatric surgery, and she will continue to do so. We encouraged her to try weight training to help her with muscle build-up.    Reduced Metoprolol from 100 mg to 50 mg once daily to prevent lightheadedness. New prescription sent today.    Refilled Terazosin, Telmisartan.  We asked her to breakdown medication to Telmisartan in the morning and Terazosin and Metoprolol in the evening.    If anything worsens or changes please call us at once, follow up in the office as planned,       Scribe Attestation  By signing my name below, IAshly Scribe   attest that this documentation has been prepared under the direction and in the presence of Anatoliy Good DO.    All medical record entries made by the Scribamber were at my direction and personally dictated by me.   I have reviewed the chart and agree that the record accurately reflects my personal performance of the history, physical exam, discussion, and plan.

## 2025-06-16 ENCOUNTER — APPOINTMENT (OUTPATIENT)
Dept: PRIMARY CARE | Facility: CLINIC | Age: 55
End: 2025-06-16
Payer: COMMERCIAL

## 2025-06-16 VITALS
OXYGEN SATURATION: 99 % | SYSTOLIC BLOOD PRESSURE: 132 MMHG | WEIGHT: 200 LBS | DIASTOLIC BLOOD PRESSURE: 84 MMHG | HEIGHT: 66 IN | BODY MASS INDEX: 32.14 KG/M2 | TEMPERATURE: 97.8 F | HEART RATE: 61 BPM

## 2025-06-16 DIAGNOSIS — I10 HYPERTENSION, UNSPECIFIED TYPE: ICD-10-CM

## 2025-06-16 DIAGNOSIS — I15.9 SECONDARY HYPERTENSION: ICD-10-CM

## 2025-06-16 DIAGNOSIS — E66.09 CLASS 1 OBESITY DUE TO EXCESS CALORIES WITHOUT SERIOUS COMORBIDITY WITH BODY MASS INDEX (BMI) OF 32.0 TO 32.9 IN ADULT: ICD-10-CM

## 2025-06-16 DIAGNOSIS — E66.811 CLASS 1 OBESITY DUE TO EXCESS CALORIES WITHOUT SERIOUS COMORBIDITY WITH BODY MASS INDEX (BMI) OF 32.0 TO 32.9 IN ADULT: ICD-10-CM

## 2025-06-16 PROCEDURE — 3079F DIAST BP 80-89 MM HG: CPT | Performed by: FAMILY MEDICINE

## 2025-06-16 PROCEDURE — 1036F TOBACCO NON-USER: CPT | Performed by: FAMILY MEDICINE

## 2025-06-16 PROCEDURE — 99213 OFFICE O/P EST LOW 20 MIN: CPT | Performed by: FAMILY MEDICINE

## 2025-06-16 PROCEDURE — 3008F BODY MASS INDEX DOCD: CPT | Performed by: FAMILY MEDICINE

## 2025-06-16 PROCEDURE — 3075F SYST BP GE 130 - 139MM HG: CPT | Performed by: FAMILY MEDICINE

## 2025-06-16 RX ORDER — TERAZOSIN 10 MG/1
10 CAPSULE ORAL NIGHTLY
Qty: 90 CAPSULE | Refills: 1 | Status: SHIPPED | OUTPATIENT
Start: 2025-06-16 | End: 2025-12-13

## 2025-06-16 RX ORDER — METOPROLOL SUCCINATE 50 MG/1
50 TABLET, EXTENDED RELEASE ORAL DAILY
Qty: 90 TABLET | Refills: 1 | Status: SHIPPED | OUTPATIENT
Start: 2025-06-16 | End: 2025-12-13

## 2025-06-16 RX ORDER — TELMISARTAN 80 MG/1
80 TABLET ORAL DAILY
Qty: 90 TABLET | Refills: 1 | Status: SHIPPED | OUTPATIENT
Start: 2025-06-16

## 2025-06-16 ASSESSMENT — PATIENT HEALTH QUESTIONNAIRE - PHQ9
1. LITTLE INTEREST OR PLEASURE IN DOING THINGS: NOT AT ALL
2. FEELING DOWN, DEPRESSED OR HOPELESS: NOT AT ALL
SUM OF ALL RESPONSES TO PHQ9 QUESTIONS 1 AND 2: 0

## (undated) DEVICE — NEEDLE, EPIDURAL 17G X 4 1/2 PERIFIX

## (undated) DEVICE — STAPLER, ECHELON 3000, 60MM LONG

## (undated) DEVICE — SYRINGE, 30 CC, LUER LOCK

## (undated) DEVICE — STAPLER,  ENDO ECHELON 60MM RELOAD, GOLD,

## (undated) DEVICE — STAPLER,  LINEAR RELOAD, 60MM, WHITE, DISP

## (undated) DEVICE — TROCAR, BLADELESS, THREADED, 15MM

## (undated) DEVICE — LIGASURE, L-HOOK 44CM SEALER/DIVIDER LAP, MARYLAND

## (undated) DEVICE — STAPLELINE, BIOABSORB, SEAMGUARD, 60

## (undated) DEVICE — STAPLER, LINEAR ENDO RELOAD, 60MM, BLUE, DISP

## (undated) DEVICE — APPLICATOR, CHLORAPREP, W/ORANGE TINT, 26ML

## (undated) DEVICE — STAPLER,  LINEAR ENDO 60MM RELOAD, GREEN, DISP

## (undated) DEVICE — SLEEVE, KII, Z-THREAD, 5X100CM

## (undated) DEVICE — CARE KIT, LAPAROSCOPIC, ADVANCED

## (undated) DEVICE — TROCAR, KII OPTICAL BLADELESS 5MM Z THREAD 100MM LNGTH

## (undated) DEVICE — IRRIGATOR, HYDRO-SURG PLUS, WITH COJOINED SUCTION AND IRRIGATION

## (undated) DEVICE — KIT, LAP GASTRIC BYPASS, CUSTOM, PARMA

## (undated) DEVICE — PROTECTOR, HEEL/ANKLE/ELBOW, UNIVERSAL

## (undated) DEVICE — TUBE SET, PNEUMOLAR HEATED, SMOKE EVACU, HIGH-FLOW

## (undated) DEVICE — NEEDLE, CLOSURE, OMNICLOSE

## (undated) DEVICE — CLIP, ENDO APPLIER LIGAMAX 5MM

## (undated) DEVICE — SLEEVE, VASO PRESS, CALF GARMENT, LARGE, GREEN

## (undated) DEVICE — SYRINGE, 60 CC, IRRIGATION, PISTON, CATH TIP, W/LUER ADAPTER,DISP